# Patient Record
Sex: FEMALE | Race: WHITE | Employment: OTHER | URBAN - METROPOLITAN AREA
[De-identification: names, ages, dates, MRNs, and addresses within clinical notes are randomized per-mention and may not be internally consistent; named-entity substitution may affect disease eponyms.]

---

## 2017-03-05 RX ORDER — LISINOPRIL AND HYDROCHLOROTHIAZIDE 12.5; 2 MG/1; MG/1
TABLET ORAL
Qty: 90 TAB | Refills: 3 | Status: SHIPPED | OUTPATIENT
Start: 2017-03-05 | End: 2018-02-25 | Stop reason: SDUPTHER

## 2017-04-10 NOTE — TELEPHONE ENCOUNTER
Patient states that Dr. Abhilash Johnson has been prescribing Tramadol for over 5 years for pain he managed. [facility] is no longer prescribing any pain medication long term & requested she contact her PCP office. Patient states Tramadol is written for 50mg q6h prn. She is only taking it as 1 tab q8h. Patient advised Dr. Lanie Estrada will need to review this request on 4/12 & we will advise further. Patient given an opportunity to ask questions, repeated information, and verbalized understanding.

## 2017-04-11 ENCOUNTER — TELEPHONE (OUTPATIENT)
Dept: INTERNAL MEDICINE CLINIC | Age: 72
End: 2017-04-11

## 2017-04-11 RX ORDER — TRAMADOL HYDROCHLORIDE 50 MG/1
50 TABLET ORAL
Qty: 120 TAB | Refills: 1 | Status: SHIPPED | OUTPATIENT
Start: 2017-04-11 | End: 2017-05-23 | Stop reason: SDUPTHER

## 2017-04-11 NOTE — TELEPHONE ENCOUNTER
Requested Prescriptions     Signed Prescriptions Disp Refills    traMADol (ULTRAM) 50 mg tablet 120 Tab 1     Sig: Take 1 Tab by mouth every six (6) hours as needed. Max Daily Amount: 200 mg. Authorizing Provider: Gena Urbina     Phoned into pharmacy. Unable to reach patient. LVM informing patient medication approved & phoned into pharmacy.    Preferred Pharmacy        [facility]/pharmacy [facility], [address] [place] [id number]       Phone: [phone] Fax: [phone]

## 2017-05-22 DIAGNOSIS — E78.00 PURE HYPERCHOLESTEROLEMIA: ICD-10-CM

## 2017-05-22 DIAGNOSIS — I10 ESSENTIAL HYPERTENSION: Primary | ICD-10-CM

## 2017-05-23 ENCOUNTER — OFFICE VISIT (OUTPATIENT)
Dept: INTERNAL MEDICINE CLINIC | Age: 72
End: 2017-05-23

## 2017-05-23 VITALS
HEART RATE: 69 BPM | DIASTOLIC BLOOD PRESSURE: 65 MMHG | BODY MASS INDEX: 31.43 KG/M2 | WEIGHT: 177.4 LBS | TEMPERATURE: 98.2 F | OXYGEN SATURATION: 97 % | SYSTOLIC BLOOD PRESSURE: 134 MMHG | RESPIRATION RATE: 18 BRPM | HEIGHT: 63 IN

## 2017-05-23 DIAGNOSIS — M47.9 OSTEOARTHRITIS OF SPINE, UNSPECIFIED SPINAL OSTEOARTHRITIS COMPLICATION STATUS, UNSPECIFIED SPINAL REGION: ICD-10-CM

## 2017-05-23 DIAGNOSIS — E03.9 UNSPECIFIED HYPOTHYROIDISM: ICD-10-CM

## 2017-05-23 DIAGNOSIS — R01.1 HEART MURMUR: ICD-10-CM

## 2017-05-23 DIAGNOSIS — I10 ESSENTIAL HYPERTENSION, BENIGN: ICD-10-CM

## 2017-05-23 DIAGNOSIS — E78.00 PURE HYPERCHOLESTEROLEMIA: Primary | ICD-10-CM

## 2017-05-23 RX ORDER — TRAMADOL HYDROCHLORIDE 50 MG/1
50 TABLET ORAL
Qty: 120 TAB | Refills: 4 | Status: SHIPPED | OUTPATIENT
Start: 2017-05-23 | End: 2018-01-17 | Stop reason: SDUPTHER

## 2017-05-23 NOTE — MR AVS SNAPSHOT
Visit Information Date & Time Provider Department Dept. Phone Encounter #  
 5/23/2017  9:30 AM Dangelo Live, [address] [facility] [address] [phone] [id number] Follow-up Instructions Return in about 6 months (around 11/23/2017) for htn hld hypothyroid. Upcoming Health Maintenance Date Due Bone Densitometry 10/20/2013 GLAUCOMA SCREENING Q2Y 9/15/2016 BREAST CANCER SCRN MAMMOGRAM 5/6/2017 MEDICARE YEARLY EXAM 5/25/2017 INFLUENZA AGE 9 TO ADULT 8/1/2017 COLONOSCOPY 2/28/2024 DTaP/Tdap/Td series (2 - Td) 4/7/2024 Allergies as of 5/23/2017  Review Complete On: 5/23/2017 By: Ti JEROME Rash, LPN Severity Noted Reaction Type Reactions Statins-hmg-coa Reductase Inhibitors High 02/25/2014    Other (comments) Severe pain and weakness Amoxicillin  10/05/2009    Hives Colestid [Colestipol]  11/05/2014    Myalgia Pcn [Penicillins]  04/03/2012    Hives Pravastatin  10/05/2010    Myalgia Severe pain and weakness with all statins Current Immunizations  Reviewed on 11/23/2016 Name Date Influenza High Dose Vaccine PF 9/29/2016, 9/30/2015, 9/20/2014 Influenza Vaccine 10/1/2012 Pneumococcal Conjugate (PCV-13) 9/2/2015 Pneumococcal Polysaccharide (PPSV-23) 11/23/2016 Pneumococcal Vaccine (Unspecified Type) 11/3/2009 Tdap 4/7/2014 Zoster Vaccine, Live 10/15/2012 Not reviewed this visit You Were Diagnosed With   
  
 Codes Comments Pure hypercholesterolemia    -  Primary ICD-10-CM: E78.00 ICD-9-CM: 272.0 Essential hypertension, benign     ICD-10-CM: I10 
ICD-9-CM: 401.1 Unspecified hypothyroidism     ICD-10-CM: E03.9 ICD-9-CM: 244.9 Osteoarthritis of spine, unspecified spinal osteoarthritis complication status, unspecified spinal region     ICD-10-CM: M47.9 ICD-9-CM: 721.90 Heart murmur     ICD-10-CM: R01.1 ICD-9-CM: 432. 2 Vitals BP Pulse Temp Resp Height(growth percentile) Weight(growth percentile) 134/65 (BP 1 Location: Left arm, BP Patient Position: Sitting) 69 98.2 °F (36.8 °C) (Oral) 18 5' 3\" (1.6 m) 177 lb 6.4 oz (80.5 kg) SpO2 BMI OB Status Smoking Status 97% 31.42 kg/m2 Postmenopausal Never Smoker BMI and BSA Data Body Mass Index Body Surface Area  
 31.42 kg/m 2 1.89 m 2 Preferred Pharmacy Pharmacy Name Phone [facility] [address] [place] [address] [phone] Your Updated Medication List  
  
   
This list is accurate as of: 5/23/17 10:13 AM.  Always use your most recent med list. ADVIL LIQUI-GEL PO Take 400 mg by mouth three (3) times daily. 2 tablets 3 times daily  
  
 amLODIPine 2.5 mg tablet Commonly known as:  [place] Take 1 Tab by mouth daily. b complex vitamins tablet Take 1 Tab by mouth daily. ERIC ASPIRIN PO Take 500 mg by mouth daily. Extra strengh CRANBERRY CONCENTRATE Cap Generic drug:  vitamin c-vitamin e Take 500 mg by mouth daily. DITROPAN PO Take 15 mg by mouth daily. fexofenadine 180 mg tablet Commonly known as:  Christina Lakshmi Take  by mouth every thirty (30) days. fluticasone 50 mcg/actuation nasal spray Commonly known as:  [place] 2 Sprays by Nasal route nightly. GINKOBA PO Take 120 mg by mouth two (2) times a day. HAIR, SKIN, NAILS WITH BIOTIN 7.5-7.5-1,250 mg-unit-mcg Chew Generic drug:  ascorbic acid-vitamin E-biotin Take  by mouth. JINTELI 1-5 mg-mcg Tab Generic drug:  norethindrone acetate-ethinyl estradiol Take 1-5 mcg by mouth every other day. levothyroxine 50 mcg tablet Commonly known as:  SYNTHROID  
TAKE 1 TABLET BY MOUTH EVERY DAY  
  
 lisinopril-hydroCHLOROthiazide 20-12.5 mg per tablet Commonly known as:  PRINZIDE, ZESTORETIC  
TAKE 1 TABLET EVERY DAY  
  
 OCUVITE ADULT 50+ PO Take  by mouth daily. * omeprazole 20 mg capsule Commonly known as:  PRILOSEC Take 20 mg by mouth daily. * Omeprazole delayed release 20 mg tablet Commonly known as:  PRILOSEC D/R Take 20 mg by mouth daily. ONE DAILY GUMMY VITES PO Take 2 Tabs by mouth two (2) times a day. OTHER Patient is taking Omega Red daily POTASSIMIN PO Take 550 mg by mouth daily. traMADol 50 mg tablet Commonly known as:  ULTRAM  
Take 1 Tab by mouth every six (6) hours as needed. Max Daily Amount: 200 mg.  
  
 vitamin e 1,000 unit capsule Commonly known as:  E GEMS Take 1,000 Units by mouth daily. * Notice: This list has 2 medication(s) that are the same as other medications prescribed for you. Read the directions carefully, and ask your doctor or other care provider to review them with you. Prescriptions Printed Refills  
 traMADol (ULTRAM) 50 mg tablet 4 Sig: Take 1 Tab by mouth every six (6) hours as needed. Max Daily Amount: 200 mg. Class: Print Route: Oral  
  
Follow-up Instructions Return in about 6 months (around 11/23/2017) for htn hld hypothyroid. To-Do List   
 05/26/2017 ECHO:  2D ECHO COMPLETE ADULT (TTE) W OR WO CONTR Introducing [facility]! Peg Hemp introduces [employer] patient portal. Now you can access parts of your medical record, email your doctor's office, and request medication refills online. 1. In your internet browser, go to [email or web address] [employer] 2. Click on the First Time User? Click Here link in the Sign In box. You will see the New Member Sign Up page. 3. Enter your [employer] Access Code exactly as it appears below. You will not need to use this code after youve completed the sign-up process. If you do not sign up before the expiration date, you must request a new code. · [employer] Access Code: [email or web address] Expires: 8/21/2017 10:13 AM 
 
 4. Enter the last four digits of your Social Security Number (xxxx) and Date of Birth (mm/dd/yyyy) as indicated and click Submit. You will be taken to the next sign-up page. 5. Create a [employer] ID. This will be your [employer] login ID and cannot be changed, so think of one that is secure and easy to remember. 6. Create a [employer] password. You can change your password at any time. 7. Enter your Password Reset Question and Answer. This can be used at a later time if you forget your password. 8. Enter your e-mail address. You will receive e-mail notification when new information is available in [address] 9. Click Sign Up. You can now view and download portions of your medical record. 10. Click the Download Summary menu link to download a portable copy of your medical information. If you have questions, please visit the Frequently Asked Questions section of the [employer] website. Remember, [employer] is NOT to be used for urgent needs. For medical emergencies, dial 911. Now available from your iPhone and Android! Please provide this summary of care documentation to your next provider. Your primary care clinician is listed as Miguel PRATER. If you have any questions after today's visit, please call [phone].

## 2017-05-23 NOTE — PROGRESS NOTES
Kathleen Antony is a 73 y.o. female  Chief Complaint   Patient presents with    Hypertension    Cholesterol Problem     1. Have you been to an emergency room, urgent clinic, or hospitalized since your last visit? NO  If yes, where when, and reason for visit? 2. Have seen or consulted any other health care provider since your last visit? NO  Please include any pap smears or colon screening in this section  If yes, where when, and reason for visit? 6. Do you have an Advanced Directive/ Living Will in place?  YES  If yes, do we have a copy on file NO  If no, would you like information NO

## 2017-05-23 NOTE — PROGRESS NOTES
HISTORY OF PRESENT ILLNESS  Viktor Pérez is a 73 y.o. female. HPI   6 month f/u HTN HLD hypopthyroidism  Takes tramadol tid for back , hands and feet pain    Patient Active Problem List    Diagnosis Date Noted    Heart murmur 05/24/2016    Advanced care planning/counseling discussion 05/24/2016    DDD (degenerative disc disease), lumbar 10/04/2013    Obesity 04/05/2013    Essential hypertension, benign 03/31/2010    Pure hypercholesterolemia 03/31/2010    Overweight 03/31/2010    Unspecified hypothyroidism 03/31/2010    DJD (degenerative joint disease) 03/31/2010    Overactive bladder 03/31/2010    Diverticulosis 03/31/2010     Current Outpatient Prescriptions   Medication Sig Dispense Refill    traMADol (ULTRAM) 50 mg tablet Take 1 Tab by mouth every six (6) hours as needed. Max Daily Amount: 200 mg. 120 Tab 4    lisinopril-hydroCHLOROthiazide (PRINZIDE, ZESTORETIC) 20-12.5 mg per tablet TAKE 1 TABLET EVERY DAY 90 Tab 3    levothyroxine (SYNTHROID) 50 mcg tablet TAKE 1 TABLET BY MOUTH EVERY DAY 90 Tab 3    amLODIPine (NORVASC) 2.5 mg tablet Take 1 Tab by mouth daily. 90 Tab 3    ANTIOX #11/OM3/DHA/EPA/LUT/YIN (OCUVITE ADULT 50+ PO) Take  by mouth daily.  ascorbic acid-vitamin E-biotin (HAIR,SKIN & NAILS WITH BIOTIN) 7.5-7.5-1,250 mg-unit-mcg chew Take  by mouth.  OTHER Patient is taking Omega Red daily      ERIC ASPIRIN PO Take 500 mg by mouth daily. Extra strengh       OXYBUTYNIN CHLORIDE (DITROPAN PO) Take 15 mg by mouth daily.  FOLIC ACID/MULTIVITS-MIN (ONE DAILY GUMMY VITES PO) Take 2 Tabs by mouth two (2) times a day.  omeprazole (PRILOSEC) 20 mg capsule Take 20 mg by mouth daily.  IBUPROFEN (ADVIL LIQUI-GEL PO) Take 400 mg by mouth three (3) times daily. 2 tablets 3 times daily      ethinyl estradiol-norethindron (JINTELI) 1-5 mg-mcg Tab Take 1-5 mcg by mouth every other day.  vitamin c-vitamin e (CRANBERRY CONCENTRATE) Cap Take 500 mg by mouth daily.       fluticasone (FLONASE) 50 mcg/Actuation nasal spray 2 Sprays by Nasal route nightly.  fexofenadine (ALLEGRA) 180 mg tablet Take  by mouth every thirty (30) days.  vitamin e (E GEMS) 1,000 unit capsule Take 1,000 Units by mouth daily.  POTASSIUM (POTASSIMIN PO) Take 550 mg by mouth daily.  Omeprazole delayed release (PRILOSEC D/R) 20 mg tablet Take 20 mg by mouth daily.  b complex vitamins tablet Take 1 Tab by mouth daily.  GINKGO BILOBA (GINKOBA PO) Take 120 mg by mouth two (2) times a day. Allergies   Allergen Reactions    Statins-Hmg-Coa Reductase Inhibitors Other (comments)     Severe pain and weakness    Amoxicillin Hives    Colestid [Colestipol] Myalgia    Pcn [Penicillins] Hives    Pravastatin Myalgia     Severe pain and weakness with all statins      Lab Results  Component Value Date/Time   WBC 7.8 11/23/2016 01:50 PM   HGB 15.2 11/23/2016 01:50 PM   HCT 45.4 11/23/2016 01:50 PM   PLATELET 056 51/62/3925 01:50 PM   MCV 91 11/23/2016 01:50 PM       Lab Results  Component Value Date/Time   Cholesterol, total 237 11/09/2015 12:18 PM   HDL Cholesterol 53 11/09/2015 12:18 PM   LDL, calculated 163 11/09/2015 12:18 PM   Triglyceride 105 11/09/2015 12:18 PM       Lab Results  Component Value Date/Time   GFR est AA 80 11/23/2016 01:50 PM   GFR est non-AA 69 11/23/2016 01:50 PM   Creatinine 0.86 11/23/2016 01:50 PM   BUN 20 11/23/2016 01:50 PM   Sodium 139 11/23/2016 01:50 PM   Potassium 5.3 11/23/2016 01:50 PM   Chloride 98 11/23/2016 01:50 PM   CO2 25 11/23/2016 01:50 PM         ROS    Physical Exam   Constitutional: She appears well-developed and well-nourished. Appears stated age   Cardiovascular: Normal rate, regular rhythm and normal heart sounds. Exam reveals no gallop and no friction rub. No murmur heard. Pulmonary/Chest: Effort normal and breath sounds normal. No respiratory distress. She has no wheezes. Abdominal: Soft.  Bowel sounds are normal. Musculoskeletal: She exhibits no edema. Neurological: She is alert. Psychiatric: She has a normal mood and affect. Nursing note and vitals reviewed. ASSESSMENT and PLAN  James was seen today for hypertension and cholesterol problem.     Diagnoses and all orders for this visit:    Pure hypercholesterolemia   On krill oil   Intolerant statin, zetia  HTN   contrlled    Hypothyroid   appears euthyroid clinically  Heart murmur   Echo ordered    OA  ` Refilled tramadol 120 plus 4 refills  Other orders  -       RTC 6 months  Follow-up Disposition: Not on File

## 2017-06-08 ENCOUNTER — HOSPITAL ENCOUNTER (OUTPATIENT)
Dept: NON INVASIVE DIAGNOSTICS | Age: 72
Discharge: HOME | End: 2017-06-08
Attending: INTERNAL MEDICINE
Payer: MEDICARE

## 2017-06-08 DIAGNOSIS — R01.1 HEART MURMUR: ICD-10-CM

## 2017-06-08 PROBLEM — I35.0 NONRHEUMATIC AORTIC VALVE STENOSIS: Status: ACTIVE | Noted: 2017-06-08

## 2017-06-08 PROCEDURE — 93306 TTE W/DOPPLER COMPLETE: CPT

## 2017-10-19 ENCOUNTER — HOSPITAL ENCOUNTER (OUTPATIENT)
Dept: LAB | Age: 72
Discharge: HOME | End: 2017-10-19
Payer: MEDICARE

## 2017-10-19 ENCOUNTER — APPOINTMENT (OUTPATIENT)
Dept: INTERNAL MEDICINE CLINIC | Age: 72
End: 2017-10-19

## 2017-10-19 DIAGNOSIS — I10 ESSENTIAL HYPERTENSION, BENIGN: Primary | ICD-10-CM

## 2017-10-19 DIAGNOSIS — E78.00 PURE HYPERCHOLESTEROLEMIA: ICD-10-CM

## 2017-10-19 DIAGNOSIS — E03.9 HYPOTHYROIDISM, UNSPECIFIED TYPE: ICD-10-CM

## 2017-10-19 PROCEDURE — 85027 COMPLETE CBC AUTOMATED: CPT

## 2017-10-19 PROCEDURE — 36415 COLL VENOUS BLD VENIPUNCTURE: CPT

## 2017-10-19 PROCEDURE — 80061 LIPID PANEL: CPT

## 2017-10-19 PROCEDURE — 84443 ASSAY THYROID STIM HORMONE: CPT

## 2017-10-19 PROCEDURE — 80053 COMPREHEN METABOLIC PANEL: CPT

## 2017-10-20 LAB
ALBUMIN SERPL-MCNC: 4.4 G/DL (ref 3.5–4.8)
ALBUMIN/GLOB SERPL: 1.6 {RATIO} (ref 1.2–2.2)
ALP SERPL-CCNC: 90 IU/L (ref 39–117)
ALT SERPL-CCNC: 20 IU/L (ref 0–32)
AST SERPL-CCNC: 19 IU/L (ref 0–40)
BILIRUB SERPL-MCNC: 0.4 MG/DL (ref 0–1.2)
BUN SERPL-MCNC: 19 MG/DL (ref 8–27)
BUN/CREAT SERPL: 20 (ref 12–28)
CALCIUM SERPL-MCNC: 9.6 MG/DL (ref 8.7–10.3)
CHLORIDE SERPL-SCNC: 98 MMOL/L (ref 96–106)
CHOLEST SERPL-MCNC: 258 MG/DL (ref 100–199)
CO2 SERPL-SCNC: 24 MMOL/L (ref 18–29)
CREAT SERPL-MCNC: 0.94 MG/DL (ref 0.57–1)
ERYTHROCYTE [DISTWIDTH] IN BLOOD BY AUTOMATED COUNT: 13.7 % (ref 12.3–15.4)
GFR SERPLBLD CREATININE-BSD FMLA CKD-EPI: 62 ML/MIN/1.73
GFR SERPLBLD CREATININE-BSD FMLA CKD-EPI: 71 ML/MIN/1.73
GLOBULIN SER CALC-MCNC: 2.8 G/DL (ref 1.5–4.5)
GLUCOSE SERPL-MCNC: 98 MG/DL (ref 65–99)
HCT VFR BLD AUTO: 43.4 % (ref 34–46.6)
HDLC SERPL-MCNC: 56 MG/DL
HGB BLD-MCNC: 15.3 G/DL (ref 11.1–15.9)
LDLC SERPL CALC-MCNC: 178 MG/DL (ref 0–99)
MCH RBC QN AUTO: 30.9 PG (ref 26.6–33)
MCHC RBC AUTO-ENTMCNC: 35.3 G/DL (ref 31.5–35.7)
MCV RBC AUTO: 88 FL (ref 79–97)
PLATELET # BLD AUTO: 281 X10E3/UL (ref 150–379)
POTASSIUM SERPL-SCNC: 4.7 MMOL/L (ref 3.5–5.2)
PROT SERPL-MCNC: 7.2 G/DL (ref 6–8.5)
RBC # BLD AUTO: 4.95 X10E6/UL (ref 3.77–5.28)
SODIUM SERPL-SCNC: 139 MMOL/L (ref 134–144)
TRIGL SERPL-MCNC: 121 MG/DL (ref 0–149)
TSH SERPL DL<=0.005 MIU/L-ACNC: 0.86 UIU/ML (ref 0.45–4.5)
VLDLC SERPL CALC-MCNC: 24 MG/DL (ref 5–40)
WBC # BLD AUTO: 8.2 X10E3/UL (ref 3.4–10.8)

## 2017-11-26 NOTE — PROGRESS NOTES
HISTORY OF PRESENT ILLNESS  Jaswant Zhou is a 81 y.o. female. HPI   6 month f/u HTN HLD hypopthyroidism, chronic pain r/t oa of hands , back and feet  Takes tramadol tid for back , hands and feet pain  Saw Dr Gin Olivarez for increase low back pain and sciatica of right leg--s/p [id number] which has helped  Lowered tramadol to 2 tabs per day and taking gabapentin now  Had recent labs--normal cbc cmp tsh  LDL still elevated at 178 -statin intolerant and not able to take zetia or cholestipol. Labs were drawn nonfasting  Sees gyn MD for well woman care---goes q 2yrs due next year. Pt declines further mammograms       Patient Active Problem List    Diagnosis Date Noted    Nonrheumatic aortic valve stenosis 06/08/2017    Heart murmur 05/24/2016    Advanced care planning/counseling discussion 05/24/2016    DDD (degenerative disc disease), lumbar 10/04/2013    Obesity 04/05/2013    Essential hypertension, benign 03/31/2010    Pure hypercholesterolemia 03/31/2010    Overweight(278.02) 03/31/2010    Unspecified hypothyroidism 03/31/2010    DJD (degenerative joint disease) 03/31/2010    Overactive bladder 03/31/2010    Diverticulosis 03/31/2010     Current Outpatient Prescriptions   Medication Sig Dispense Refill    traMADol (ULTRAM) 50 mg tablet Take 1 Tab by mouth every six (6) hours as needed. Max Daily Amount: 200 mg. 120 Tab 4    lisinopril-hydroCHLOROthiazide (PRINZIDE, ZESTORETIC) 20-12.5 mg per tablet TAKE 1 TABLET EVERY DAY 90 Tab 3    levothyroxine (SYNTHROID) 50 mcg tablet TAKE 1 TABLET BY MOUTH EVERY DAY 90 Tab 3    amLODIPine (NORVASC) 2.5 mg tablet Take 1 Tab by mouth daily. 90 Tab 3    ANTIOX #11/OM3/DHA/EPA/LUT/YIN (OCUVITE ADULT 50+ PO) Take  by mouth daily.  Omeprazole delayed release (PRILOSEC D/R) 20 mg tablet Take 20 mg by mouth daily.  ascorbic acid-vitamin E-biotin (HAIR,SKIN & NAILS WITH BIOTIN) 7.5-7.5-1,250 mg-unit-mcg chew Take  by mouth.       OTHER Patient is taking Omega Red daily      ERIC ASPIRIN PO Take 500 mg by mouth daily. Extra strengh       OXYBUTYNIN CHLORIDE (DITROPAN PO) Take 15 mg by mouth daily.  FOLIC ACID/MULTIVITS-MIN (ONE DAILY GUMMY VITES PO) Take 2 Tabs by mouth two (2) times a day.  omeprazole (PRILOSEC) 20 mg capsule Take 20 mg by mouth daily.  b complex vitamins tablet Take 1 Tab by mouth daily.  IBUPROFEN (ADVIL LIQUI-GEL PO) Take 400 mg by mouth three (3) times daily. 2 tablets 3 times daily      ethinyl estradiol-norethindron (JINTELI) 1-5 mg-mcg Tab Take 1-5 mcg by mouth every other day.  vitamin c-vitamin e (CRANBERRY CONCENTRATE) Cap Take 500 mg by mouth daily.  fluticasone (FLONASE) 50 mcg/Actuation nasal spray 2 Sprays by Nasal route nightly.  fexofenadine (ALLEGRA) 180 mg tablet Take  by mouth every thirty (30) days.  vitamin e (E GEMS) 1,000 unit capsule Take 1,000 Units by mouth daily.  GINKGO BILOBA (GINKOBA PO) Take 120 mg by mouth two (2) times a day.  POTASSIUM (POTASSIMIN PO) Take 550 mg by mouth daily.        Allergies   Allergen Reactions    Statins-Hmg-Coa Reductase Inhibitors Other (comments)     Severe pain and weakness    Amoxicillin Hives    Colestid [Colestipol] Myalgia    Pcn [Penicillins] Hives    Pravastatin Myalgia     Severe pain and weakness with all statins      Lab Results  Component Value Date/Time   WBC 8.2 10/19/2017 01:42 PM   HGB 15.3 10/19/2017 01:42 PM   HCT 43.4 10/19/2017 01:42 PM   PLATELET 507 09/10/7177 01:42 PM   MCV 88 10/19/2017 01:42 PM     Lab Results  Component Value Date/Time   Glucose 98 10/19/2017 01:42 PM   LDL, calculated 178 10/19/2017 01:42 PM   Creatinine 0.94 10/19/2017 01:42 PM      Lab Results  Component Value Date/Time   Cholesterol, total 258 10/19/2017 01:42 PM   Cholesterol (POC) 204 04/03/2012 10:59 AM   HDL Cholesterol 56 10/19/2017 01:42 PM   LDL, calculated 178 10/19/2017 01:42 PM   LDL Cholesterol (POC) 131 04/03/2012 10:59 AM Triglyceride 121 10/19/2017 01:42 PM   Triglycerides (POC) 142 04/03/2012 10:59 AM   Lab Results  Component Value Date/Time   GFR est non-AA 62 10/19/2017 01:42 PM   GFR est AA 71 10/19/2017 01:42 PM   Creatinine 0.94 10/19/2017 01:42 PM   BUN 19 10/19/2017 01:42 PM   Sodium 139 10/19/2017 01:42 PM   Potassium 4.7 10/19/2017 01:42 PM   Chloride 98 10/19/2017 01:42 PM   CO2 24 10/19/2017 01:42 PM              ROS    Physical Exam   Constitutional: She appears well-developed and well-nourished. Appears stated age   Cardiovascular: Normal rate and regular rhythm. Exam reveals no gallop and no friction rub. Murmur heard. 2/6 manjeet RUSB   Pulmonary/Chest: Effort normal and breath sounds normal. No respiratory distress. She has no wheezes. Abdominal: Soft. Bowel sounds are normal.   Musculoskeletal: She exhibits no edema. Neurological: She is alert. Psychiatric: She has a normal mood and affect. Nursing note and vitals reviewed. ASSESSMENT and PLAN  Diagnoses and all orders for this visit:    1. Essential hypertension, benign   Reasonable control  2. Pure hypercholesterolemia   Recent lipids were nonfasting   continie to work on low fat low cholesterol diet and exercise   Did not want to try welchol  3. Hypothyroidism, unspecified type   tsh wnl  4. Primary osteoarthritis involving multiple joints   Refill tramadol when needed again   Needs to sign pain contract but left today prior to signing   Weight reduction advised  5. Encounter for long-term opiate analgesic use  -     TOXASSURE SELECT 13 (MW)   Pt has lowered tramadol to 2-3 tabs per day   Sign pain contract today  6. Gastroesophageal reflux disease, esophagitis presence not specified    7. Mild aortic stenosis  -     [facility]      Follow-up Disposition:  Return in about 6 months (around 5/27/2018) for f/u htn hld chornic pain in back and legs, hands.

## 2017-11-27 ENCOUNTER — OFFICE VISIT (OUTPATIENT)
Dept: INTERNAL MEDICINE CLINIC | Age: 72
End: 2017-11-27

## 2017-11-27 VITALS
TEMPERATURE: 98.6 F | HEIGHT: 63 IN | BODY MASS INDEX: 32.07 KG/M2 | OXYGEN SATURATION: 94 % | SYSTOLIC BLOOD PRESSURE: 144 MMHG | WEIGHT: 181 LBS | DIASTOLIC BLOOD PRESSURE: 71 MMHG | HEART RATE: 68 BPM

## 2017-11-27 DIAGNOSIS — K21.9 GASTROESOPHAGEAL REFLUX DISEASE, ESOPHAGITIS PRESENCE NOT SPECIFIED: ICD-10-CM

## 2017-11-27 DIAGNOSIS — M15.9 PRIMARY OSTEOARTHRITIS INVOLVING MULTIPLE JOINTS: ICD-10-CM

## 2017-11-27 DIAGNOSIS — E03.9 HYPOTHYROIDISM, UNSPECIFIED TYPE: ICD-10-CM

## 2017-11-27 DIAGNOSIS — Z79.891 ENCOUNTER FOR LONG-TERM OPIATE ANALGESIC USE: ICD-10-CM

## 2017-11-27 DIAGNOSIS — I35.0 MILD AORTIC STENOSIS: ICD-10-CM

## 2017-11-27 DIAGNOSIS — I10 ESSENTIAL HYPERTENSION, BENIGN: Primary | ICD-10-CM

## 2017-11-27 DIAGNOSIS — E78.00 PURE HYPERCHOLESTEROLEMIA: ICD-10-CM

## 2017-11-27 RX ORDER — GABAPENTIN 100 MG/1
CAPSULE ORAL 3 TIMES DAILY
COMMUNITY
End: 2019-01-23 | Stop reason: SDUPTHER

## 2017-11-27 RX ORDER — FLUTICASONE FUROATE AND VILANTEROL 100; 25 UG/1; UG/1
1 POWDER RESPIRATORY (INHALATION) DAILY
COMMUNITY
End: 2017-11-27 | Stop reason: SDUPTHER

## 2017-11-27 RX ORDER — FLUTICASONE FUROATE AND VILANTEROL 200; 25 UG/1; UG/1
1 POWDER RESPIRATORY (INHALATION) DAILY
COMMUNITY
End: 2020-02-24

## 2017-11-27 NOTE — MR AVS SNAPSHOT
Visit Information Date & Time Provider Department Dept. Phone Encounter #  
 11/27/2017 10:15 AM Al Jimenez, [address] [place] [address],[address] [phone] [id number] Follow-up Instructions Return in about 6 months (around 5/27/2018) for f/u htn hld chornic pain in back and legs, hands. Upcoming Health Maintenance Date Due Bone Densitometry 10/20/2013 GLAUCOMA SCREENING Q2Y 9/15/2016 MEDICARE YEARLY EXAM 5/25/2017 Influenza Age 5 to Adult 8/1/2017 BREAST CANCER SCRN MAMMOGRAM 11/27/2018 COLONOSCOPY 2/28/2024 DTaP/Tdap/Td series (2 - Td) 4/7/2024 Allergies as of 11/27/2017  Review Complete On: 11/27/2017 By: Al Jimenez MD  
  
 Severity Noted Reaction Type Reactions Statins-hmg-coa Reductase Inhibitors High 02/25/2014    Other (comments) Severe pain and weakness Amoxicillin  10/05/2009    Hives Colestid [Colestipol]  11/05/2014    Myalgia Pcn [Penicillins]  04/03/2012    Hives Pravastatin  10/05/2010    Myalgia Severe pain and weakness with all statins Current Immunizations  Reviewed on 11/23/2016 Name Date Influenza High Dose Vaccine PF 9/28/2017, 9/29/2016, 9/30/2015, 9/20/2014 Influenza Vaccine 10/1/2012 Pneumococcal Conjugate (PCV-13) 9/2/2015 Pneumococcal Polysaccharide (PPSV-23) 11/23/2016 Pneumococcal Vaccine (Unspecified Type) 11/3/2009 Tdap 4/7/2014 Zoster Vaccine, Live 10/15/2012 Not reviewed this visit You Were Diagnosed With   
  
 Codes Comments Essential hypertension, benign    -  Primary ICD-10-CM: I10 
ICD-9-CM: 401.1 Pure hypercholesterolemia     ICD-10-CM: E78.00 ICD-9-CM: 272.0 Hypothyroidism, unspecified type     ICD-10-CM: E03.9 ICD-9-CM: 244.9 Primary osteoarthritis involving multiple joints     ICD-10-CM: M15.0 ICD-9-CM: 715.09 Encounter for long-term opiate analgesic use     ICD-10-CM: T04.728 ICD-9-CM: V58.69 Gastroesophageal reflux disease, esophagitis presence not specified     ICD-10-CM: K21.9 ICD-9-CM: 530.81 Mild aortic stenosis     ICD-10-CM: I35.0 ICD-9-CM: 424.1 Vitals BP Pulse Temp Height(growth percentile) Weight(growth percentile) SpO2  
 144/71 (BP 1 Location: Left arm, BP Patient Position: Sitting) 68 98.6 °F (37 °C) (Oral) 5' 3\" (1.6 m) 181 lb (82.1 kg) 94% BMI OB Status Smoking Status 32.06 kg/m2 Postmenopausal Never Smoker BMI and BSA Data Body Mass Index Body Surface Area 32.06 kg/m 2 1.91 m 2 Preferred Pharmacy Pharmacy Name Phone [facility] [address] Quinn[address] [phone] Your Updated Medication List  
  
   
This list is accurate as of: 11/27/17 11:17 AM.  Always use your most recent med list. ADVIL LIQUI-GEL PO Take 400 mg by mouth three (3) times daily. 2 tablets 3 times daily  
  
 b complex vitamins tablet Take 1 Tab by mouth daily. ERIC ASPIRIN PO Take 500 mg by mouth daily. Extra strengh BREO ELLIPTA 200-25 mcg/dose inhaler Generic drug:  fluticasone-vilanterol Take 1 Puff by inhalation daily. CRANBERRY CONCENTRATE Cap Generic drug:  vitamin c-vitamin e Take 500 mg by mouth daily. DITROPAN PO Take 15 mg by mouth daily. fexofenadine 180 mg tablet Commonly known as:  Alroy Cordell Take  by mouth every thirty (30) days. fluticasone 50 mcg/actuation nasal spray Commonly known as:  Cresencio Atkins 2 Sprays by Nasal route nightly.  
  
 gabapentin 100 mg capsule Commonly known as:  NEURONTIN Take  by mouth three (3) times daily. GINKOBA PO Take 120 mg by mouth two (2) times a day. HAIR, SKIN, NAILS WITH BIOTIN 7.5-7.5-1,250 mg-unit-mcg Chew Generic drug:  ascorbic acid-vitamin E-biotin Take  by mouth. JINTELI 1-5 mg-mcg Tab Generic drug:  norethindrone acetate-ethinyl estradiol Take 1-5 mcg by mouth every other day. levothyroxine 50 mcg tablet Commonly known as:  SYNTHROID  
TAKE 1 TABLET BY MOUTH EVERY DAY  
  
 lisinopril-hydroCHLOROthiazide 20-12.5 mg per tablet Commonly known as:  PRINZIDE, ZESTORETIC  
TAKE 1 TABLET EVERY DAY  
  
 OCUVITE ADULT 50+ PO Take  by mouth daily. Omeprazole delayed release 20 mg tablet Commonly known as:  PRILOSEC D/R Take 20 mg by mouth daily. ONE DAILY GUMMY VITES PO Take 2 Tabs by mouth two (2) times a day. OTHER Patient is taking Omega Red daily POTASSIMIN PO Take 550 mg by mouth daily. 1/2 tab  
  
 traMADol 50 mg tablet Commonly known as:  ULTRAM  
Take 1 Tab by mouth every six (6) hours as needed. Max Daily Amount: 200 mg.  
  
 vitamin e 1,000 unit capsule Commonly known as:  E GEMS Take 1,000 Units by mouth daily. We Performed the Following REFERRAL TO CARDIOLOGY [YVE21 Custom] [facility] () [[id number] Custom] Follow-up Instructions Return in about 6 months (around 5/27/2018) for f/u htn hld chornic pain in back and legs, hands. Referral Information Referral ID Referred By Referred To  
  
 [id number] MELANIE PRATER MD   
   [address] [place] [address] Phone: [phone] Fax: [phone] Visits Status Start Date End Date 1 New Request 11/27/17 11/27/18 If your referral has a status of pending review or denied, additional information will be sent to support the outcome of this decision. Introducing [facility]! Guillaume Núñez introduces [employer] patient portal. Now you can access parts of your medical record, email your doctor's office, and request medication refills online. 1. In your internet browser, go to [email or web address] 2. Click on the First Time User? Click Here link in the Sign In box. You will see the New Member Sign Up page. 3. Enter your [employer] Access Code exactly as it appears below. You will not need to use this code after youve completed the sign-up process. If you do not sign up before the expiration date, you must request a new code. · [employer] Access Code: [email or web address] Expires: 1/17/2018 11:27 AM 
 
4. Enter the last four digits of your Social Security Number (xxxx) and Date of Birth (mm/dd/yyyy) as indicated and click Submit. You will be taken to the next sign-up page. 5. Create a [employer] ID. This will be your [employer] login ID and cannot be changed, so think of one that is secure and easy to remember. 6. Create a [employer] password. You can change your password at any time. 7. Enter your Password Reset Question and Answer. This can be used at a later time if you forget your password. 8. Enter your e-mail address. You will receive e-mail notification when new information is available in [address] 9. Click Sign Up. You can now view and download portions of your medical record. 10. Click the Download Summary menu link to download a portable copy of your medical information. If you have questions, please visit the Frequently Asked Questions section of the [employer] website. Remember, [employer] is NOT to be used for urgent needs. For medical emergencies, dial 911. Now available from your iPhone and Android! Please provide this summary of care documentation to your next provider. Your primary care clinician is listed as Joyce PRATER. If you have any questions after today's visit, please call [phone].

## 2017-12-02 RX ORDER — LEVOTHYROXINE SODIUM 50 UG/1
TABLET ORAL
Qty: 90 TAB | Refills: 3 | Status: SHIPPED | OUTPATIENT
Start: 2017-12-02 | End: 2018-11-12 | Stop reason: SDUPTHER

## 2017-12-13 NOTE — TELEPHONE ENCOUNTER
#[phone]  Pt states she can't get in touch with physician that prescribes this. Please refill she ask.   Oxybutynin 15 mg

## 2017-12-14 RX ORDER — OXYBUTYNIN CHLORIDE 15 MG/1
15 TABLET, EXTENDED RELEASE ORAL DAILY
Qty: 90 TAB | Refills: 3 | Status: SHIPPED | OUTPATIENT
Start: 2017-12-14 | End: 2018-10-26 | Stop reason: SDUPTHER

## 2018-02-01 ENCOUNTER — OFFICE VISIT (OUTPATIENT)
Dept: CARDIOLOGY CLINIC | Age: 73
End: 2018-02-01

## 2018-02-01 ENCOUNTER — CLINICAL SUPPORT (OUTPATIENT)
Dept: CARDIOLOGY CLINIC | Age: 73
End: 2018-02-01

## 2018-02-01 VITALS
WEIGHT: 183.8 LBS | DIASTOLIC BLOOD PRESSURE: 78 MMHG | OXYGEN SATURATION: 97 % | SYSTOLIC BLOOD PRESSURE: 152 MMHG | RESPIRATION RATE: 20 BRPM | HEIGHT: 63 IN | BODY MASS INDEX: 32.57 KG/M2 | HEART RATE: 66 BPM

## 2018-02-01 DIAGNOSIS — R01.1 HEART MURMUR: ICD-10-CM

## 2018-02-01 DIAGNOSIS — I35.0 AORTIC STENOSIS, MILD: ICD-10-CM

## 2018-02-01 DIAGNOSIS — I10 ESSENTIAL HYPERTENSION, BENIGN: ICD-10-CM

## 2018-02-01 DIAGNOSIS — E78.00 PURE HYPERCHOLESTEROLEMIA: ICD-10-CM

## 2018-02-01 DIAGNOSIS — R00.2 FLUTTERING SENSATION OF HEART: Primary | ICD-10-CM

## 2018-02-01 DIAGNOSIS — R07.89 CHEST TIGHTNESS OR PRESSURE: Primary | ICD-10-CM

## 2018-02-01 DIAGNOSIS — R00.2 INTERMITTENT PALPITATIONS: ICD-10-CM

## 2018-02-01 NOTE — PROGRESS NOTES
1. Have you been to the ER, urgent care clinic since your last visit? Hospitalized since your last visit? NO    2. Have you seen or consulted any other health care providers outside of the [address] [place] [address] since your last visit? Include any pap smears or colon screening. YES ORTHO. NEW PATIENT. C/O DIZZINESS, HEART FLUTTERING OFF AND ON, PRESSURE IN CHEST.

## 2018-02-01 NOTE — MR AVS SNAPSHOT
[address] 
[phone] Patient: Bill Carmen MRN:  [id number] Visit Information Date & Time Provider Department Dept. Phone Encounter #  
 2/1/2018  2:30 PM Andrew Justin, 1024 [facility] [phone] [id number] Your Appointments 5/31/2018 11:00 AM  
ROUTINE CARE with Lyssa Moser, [address] [place] [address],[address]) Appt Note: 6 month follow up  
 [facility] [address] [address]  
[address] [facility] [address] Upcoming Health Maintenance Date Due Bone Densitometry 10/20/2013 GLAUCOMA SCREENING Q2Y 9/15/2016 MEDICARE YEARLY EXAM 5/25/2017 BREAST CANCER SCRN MAMMOGRAM 11/27/2018 COLONOSCOPY 2/28/2024 DTaP/Tdap/Td series (2 - Td) 4/7/2024 Allergies as of 2/1/2018  Review Complete On: 2/1/2018 By: Willie Ruffin NP Severity Noted Reaction Type Reactions Statins-hmg-coa Reductase Inhibitors High 02/25/2014    Other (comments) Severe pain and weakness Amoxicillin  10/05/2009    Hives Colestid [Colestipol]  11/05/2014    Myalgia Pcn [Penicillins]  04/03/2012    Hives Pravastatin  10/05/2010    Myalgia Severe pain and weakness with all statins Current Immunizations  Reviewed on 11/23/2016 Name Date Influenza High Dose Vaccine PF 9/28/2017, 9/29/2016, 9/30/2015, 9/20/2014 Influenza Vaccine 10/1/2012 Pneumococcal Conjugate (PCV-13) 9/2/2015 Pneumococcal Polysaccharide (PPSV-23) 11/23/2016 Pneumococcal Vaccine (Unspecified Type) 11/3/2009 Tdap 4/7/2014 Zoster Vaccine, Live 10/15/2012 Not reviewed this visit You Were Diagnosed With   
  
 Codes Comments Essential hypertension, benign    -  Primary ICD-10-CM: I10 
ICD-9-CM: 401.1  Pure hypercholesterolemia     ICD-10-CM: E78.00 
 ICD-9-CM: 272.0 Heart murmur     ICD-10-CM: R01.1 ICD-9-CM: 300. 2 Aortic stenosis, mild     ICD-10-CM: I35.0 ICD-9-CM: 424.1 Vitals BP Pulse Resp Height(growth percentile) Weight(growth percentile) SpO2  
 152/78 (BP 1 Location: Right arm, BP Patient Position: Standing) 66 20 5' 3\" (1.6 m) 183 lb 12.8 oz (83.4 kg) 97% BMI OB Status Smoking Status 32.56 kg/m2 Postmenopausal Never Smoker Vitals History BMI and BSA Data Body Mass Index Body Surface Area 32.56 kg/m 2 1.93 m 2 Preferred Pharmacy Pharmacy Name Phone [facility] [address] [phone] Your Updated Medication List  
  
   
This list is accurate as of: 2/1/18  3:23 PM.  Always use your most recent med list. ADVIL LIQUI-GEL PO Take 400 mg by mouth three (3) times daily. 2 tablets 3 times daily  
  
 b complex vitamins tablet Take 1 Tab by mouth daily. ERIC ASPIRIN PO Take 500 mg by mouth daily. Extra strengh BREO ELLIPTA 200-25 mcg/dose inhaler Generic drug:  fluticasone-vilanterol Take 1 Puff by inhalation daily. CRANBERRY CONCENTRATE Cap Generic drug:  vitamin c-vitamin e Take 500 mg by mouth daily. * DITROPAN PO Take 15 mg by mouth daily. * oxybutynin chloride XL 15 mg CR tablet Commonly known as:  DITROPAN XL Take 1 Tab by mouth daily. fexofenadine 180 mg tablet Commonly known as:  Lazaro Ege Take  by mouth every thirty (30) days. fluticasone 50 mcg/actuation nasal spray Commonly known as:  Jacqueline Perez 2 Sprays by Nasal route nightly. FLUZONE HIGH-DOSE 2017-18 (PF) Syrg injection Generic drug:  influenza vaccine 2017-18 (65 yrs+)(PF)  
TO BE ADMINISTERED BY PHARMACIST FOR IMMUNIZATION  
  
 gabapentin 100 mg capsule Commonly known as:  NEURONTIN Take  by mouth three (3) times daily.   
  
 GINKOBA PO  
 Take 120 mg by mouth two (2) times a day. HAIR, SKIN, NAILS WITH BIOTIN 7.5-7.5-1,250 mg-unit-mcg Chew Generic drug:  ascorbic acid-vitamin E-biotin Take  by mouth. JINTELI 1-5 mg-mcg Tab Generic drug:  norethindrone acetate-ethinyl estradiol Take 1-5 mcg by mouth every other day. levothyroxine 50 mcg tablet Commonly known as:  SYNTHROID  
TAKE 1 TABLET BY MOUTH EVERY DAY  
  
 lisinopril-hydroCHLOROthiazide 20-12.5 mg per tablet Commonly known as:  PRINZIDE, ZESTORETIC  
TAKE 1 TABLET EVERY DAY  
  
 OCUVITE ADULT 50+ PO Take  by mouth daily. Omeprazole delayed release 20 mg tablet Commonly known as:  PRILOSEC D/R Take 20 mg by mouth daily. ONE DAILY GUMMY VITES PO Take 2 Tabs by mouth two (2) times a day. OTHER Patient is taking Omega Red daily POTASSIMIN PO Take 550 mg by mouth daily. 1/2 tab  
  
 traMADol 50 mg tablet Commonly known as:  ULTRAM  
TAKE 1 TABLET EVERY 6 HOURS AS NEEDED  
  
 vitamin e 1,000 unit capsule Commonly known as:  E GEMS Take 1,000 Units by mouth daily. * Notice: This list has 2 medication(s) that are the same as other medications prescribed for you. Read the directions carefully, and ask your doctor or other care provider to review them with you. We Performed the Following AMB POC EKG ROUTINE W/ 12 LEADS, INTER & REP [54013 CPT(R)] To-Do List   
 02/02/2018 ECG:  CARDIAC HOLTER MONITOR, 24 HOURS   
  
 02/02/2018 ECG:  STRESS TEST LEXISCAN/CARDIOLITE Introducing hospitals [facility] HE[facility]! Chelsea Pedraza introduces [employer] patient portal. Now you can access parts of your medical record, email your doctor's office, and request medication refills online. 1. In your internet browser, go to [email or web address] 2. Click on the First Time User? Click Here link in the Sign In box. You will see the New Member Sign Up page. 3. Enter your [employer] Access Code exactly as it appears below. You will not need to use this code after youve completed the sign-up process. If you do not sign up before the expiration date, you must request a new code. · [employer] Access Code: [email or web address] Expires: 5/2/2018  1:57 PM 
 
4. Enter the last four digits of your Social Security Number (xxxx) and Date of Birth (mm/dd/yyyy) as indicated and click Submit. You will be taken to the next sign-up page. 5. Create a SPO ID. This will be your [employer]PO login ID and cannot be changed, so think of one that is secure and easy to remember. 6. Create a SPO password. You can change your password at any time. 7. Enter your Password Reset Question and Answer. This can be used at a later time if you forget your password. 8. Enter your e-mail address. You will receive e-mail notification when new information is available in [address] 9. Click Sign Up. You can now view and download portions of your medical record. 10. Click the Download Summary menu link to download a portable copy of your medical information. If you have questions, please visit the Frequently Asked Questions section of the [employer] website. Remember, [employer] is NOT to be used for urgent needs. For medical emergencies, dial 911. Now available from your iPhone and Android! Please provide this summary of care documentation to your next provider. Your primary care clinician is listed as Adis PRATER. If you have any questions after today's visit, please call [phone].

## 2018-02-01 NOTE — PROGRESS NOTES
Veronica Terrazas DNP, ANP-BC  Subjective/HPI:     Kirby Ty is a 81 y.o. female is here for new patient consultation regarding episodes of anterior chest pressure which she noted to begin in July, initially she felt it may be secondary to GI reflux or pulmonary related due to allergies/restrictive airway. She reports the episodes have continued to wax and wane describing a moderate amount of pressure placing her hands over her upper sternal area, denies to symptoms radiate outside of the chest.  She also reports feeling palpitations most noted when laying back in a recliner in the evening. Echocardiogram ordered prior to consultation by primary care notes mild aortic stenosis, normal systolic function. SUMMARY:  Left ventricle: Systolic function was normal. Ejection fraction was  estimated to be 60 %. There were no regional wall motion abnormalities. There was mild concentric hypertrophy. Left atrium: The atrium was mildly dilated. Aortic valve: There was mild stenosis. Valve mean gradient was 12 mmHg.     PCP Provider  Goran Mcnair MD  Past Medical History:   Diagnosis Date    Arthritis     back,feet    Chronic pain     back,feet    GERD (gastroesophageal reflux disease)     Hypertension     Obesity     Other ill-defined conditions(799.89)     heart murmur     Stroke ([facility] [address]     mini    Thyroid disease     hypo    Unspecified adverse effect of anesthesia     slow to wake up      Past Surgical History:   Procedure Laterality Date    HX ORTHOPAEDIC      left ankle surgery due to fx    HX TONSILLECTOMY      HX UROLOGICAL  2013    bladder biopsy    HI COLONOSCOPY W/BIOPSY SINGLE/MULTIPLE  2/28/2014         HI EGD BALLOON DILATION ESOPHAGUS <30 MM DIAM  11/5/2012         HI EGD TRANSORAL BIOPSY SINGLE/MULTIPLE  11/5/2012          Allergies   Allergen Reactions    Statins-Hmg-Coa Reductase Inhibitors Other (comments)     Severe pain and weakness    Amoxicillin Hives    Colestid [Colestipol] Myalgia    Pcn [Penicillins] Hives    Pravastatin Myalgia     Severe pain and weakness with all statins      Family History   Problem Relation Age of Onset    Alcohol abuse Mother     Arthritis-osteo Mother     Cancer Father      testicular      Current Outpatient Prescriptions   Medication Sig    influenza vaccine 2017-18, 65 yrs+,,PF, (FLUZONE HIGH-DOSE 2017-18, PF,) syrg injection TO BE ADMINISTERED BY PHARMACIST FOR IMMUNIZATION    traMADol (ULTRAM) 50 mg tablet TAKE 1 TABLET EVERY 6 HOURS AS NEEDED    oxybutynin chloride XL (DITROPAN XL) 15 mg CR tablet Take 1 Tab by mouth daily.  levothyroxine (SYNTHROID) 50 mcg tablet TAKE 1 TABLET BY MOUTH EVERY DAY    gabapentin (NEURONTIN) 100 mg capsule Take  by mouth three (3) times daily.  fluticasone-vilanterol (BREO ELLIPTA) 200-25 mcg/dose inhaler Take 1 Puff by inhalation daily.  lisinopril-hydroCHLOROthiazide (PRINZIDE, ZESTORETIC) 20-12.5 mg per tablet TAKE 1 TABLET EVERY DAY    ANTIOX #11/OM3/DHA/EPA/LUT/YIN (OCUVITE ADULT 50+ PO) Take  by mouth daily.  Omeprazole delayed release (PRILOSEC D/R) 20 mg tablet Take 20 mg by mouth daily.  ascorbic acid-vitamin E-biotin (HAIR,SKIN & NAILS WITH BIOTIN) 7.5-7.5-1,250 mg-unit-mcg chew Take  by mouth.  OTHER Patient is taking Omega Red daily    ERIC ASPIRIN PO Take 500 mg by mouth daily. Extra strengh     OXYBUTYNIN CHLORIDE (DITROPAN PO) Take 15 mg by mouth daily.  FOLIC ACID/MULTIVITS-MIN (ONE DAILY GUMMY VITES PO) Take 2 Tabs by mouth two (2) times a day.  IBUPROFEN (ADVIL LIQUI-GEL PO) Take 400 mg by mouth three (3) times daily. 2 tablets 3 times daily    ethinyl estradiol-norethindron (JINTELI) 1-5 mg-mcg Tab Take 1-5 mcg by mouth every other day.  vitamin c-vitamin e (CRANBERRY CONCENTRATE) Cap Take 500 mg by mouth daily.  fluticasone (FLONASE) 50 mcg/Actuation nasal spray 2 Sprays by Nasal route nightly.     fexofenadine (ALLEGRA) 180 mg tablet Take  by mouth every thirty (30) days.  vitamin e (E GEMS) 1,000 unit capsule Take 1,000 Units by mouth daily.  POTASSIUM (POTASSIMIN PO) Take 550 mg by mouth daily. 1/2 tab    b complex vitamins tablet Take 1 Tab by mouth daily.  GINKGO BILOBA (GINKOBA PO) Take 120 mg by mouth two (2) times a day. No current facility-administered medications for this visit. Vitals:    02/01/18 1405 02/01/18 1417 02/01/18 1418   BP: 170/70 162/80 152/78   Pulse: 66     Resp: 20     SpO2: 97%     Weight: 183 lb 12.8 oz (83.4 kg)     Height: 5' 3\" (1.6 m)       Social History     Social History    Marital status:      Spouse name: N/A    Number of children: N/A    Years of education: N/A     Occupational History    Not on file. Social History Main Topics    Smoking status: Never Smoker    Smokeless tobacco: Never Used    Alcohol use No    Drug use: Yes     Special: Prescription, OTC    Sexual activity: Not on file     Other Topics Concern    Not on file     Social History Narrative       I have reviewed the nurses notes, vitals, problem list, allergy list, medical history, family, social history and medications. Review of Symptoms:    General: Pt denies excessive weight gain or loss. Pt is able to conduct ADL's  HEENT: Denies blurred vision, headaches, epistaxis and difficulty swallowing. Respiratory: Denies shortness of breath, ROLDAN, wheezing or stridor. Cardiovascular:  + chest pressure , + palpitations, + edema bilateral legs with history of varicosities  denies PND  Gastrointestinal: Denies poor appetite, indigestion, abdominal pain or blood in stool  Musculoskeletal: Denies pain or swelling from muscles or joints  Neurologic: Denies tremor, paresthesias, or sensory motor disturbance  Skin: Denies rash, itching or texture change. Physical Exam:      General: Well developed, in no acute distress, cooperative and alert  HEENT: No carotid bruits, no JVD, trach is midline.  Neck Supple, PEERL, EOM intact. Heart:  Normal S1/S2 negative S3 or S4. Regular, no murmur, gallop or rub.   Respiratory: Clear bilaterally x 4, no wheezing or rales  Abdomen:   Soft, non-tender, no masses, bowel sounds are active.   Extremities: Nonpitting bilateral dependent ankle and mid calf edema, normal cap refill, no cyanosis, atraumatic. Neuro: A&Ox3, speech clear, gait stable. Skin: Skin color is normal. No rashes or lesions. Non diaphoretic  Vascular: 2+ pulses symmetric in all extremities, marked bilateral lower extremity varicosities torturous and ropelike posterior right leg. Cardiographics    ECG: Sinus rhythm  No results found for this or any previous visit. Cardiology Labs:  Lab Results   Component Value Date/Time    Cholesterol, total 258 10/19/2017 01:42 PM    HDL Cholesterol 56 10/19/2017 01:42 PM    LDL, calculated 178 10/19/2017 01:42 PM    Triglyceride 121 10/19/2017 01:42 PM       Lab Results   Component Value Date/Time    Sodium 139 10/19/2017 01:42 PM    Potassium 4.7 10/19/2017 01:42 PM    Chloride 98 10/19/2017 01:42 PM    CO2 24 10/19/2017 01:42 PM    Glucose 98 10/19/2017 01:42 PM    BUN 19 10/19/2017 01:42 PM    Creatinine 0.94 10/19/2017 01:42 PM    BUN/Creatinine ratio 20 10/19/2017 01:42 PM    GFR est AA 71 10/19/2017 01:42 PM    GFR est non-AA 62 10/19/2017 01:42 PM    Calcium 9.6 10/19/2017 01:42 PM    Bilirubin, total 0.4 10/19/2017 01:42 PM    AST (SGOT) 19 10/19/2017 01:42 PM    Alk. phosphatase 90 10/19/2017 01:42 PM    Protein, total 7.2 10/19/2017 01:42 PM    Albumin 4.4 10/19/2017 01:42 PM    A-G Ratio 1.6 10/19/2017 01:42 PM    ALT (SGPT) 20 10/19/2017 01:42 PM           Assessment:     Assessment:     Diagnoses and all orders for this visit:    1. Chest tightness or pressure    2. Essential hypertension, benign  -     AMB POC EKG ROUTINE W/ 12 LEADS, INTER & REP  -     LEXISCAN/CARDIOLITE, Clinic Performed; Future  -     HOLTER MONITOR, 24 HOURS, Clinic Performed; Future    3.  Pure hypercholesterolemia  -     LEXISCAN/CARDIOLITE, Clinic Performed; Future  -     HOLTER MONITOR, 24 HOURS, Clinic Performed; Future    4. Heart murmur  -     LEXISCAN/CARDIOLITE, Clinic Performed; Future  -     HOLTER MONITOR, 24 HOURS, Clinic Performed; Future    5. Aortic stenosis, mild  -     LEXISCAN/CARDIOLITE, Clinic Performed; Future  -     HOLTER MONITOR, 24 HOURS, Clinic Performed; Future    6. Intermittent palpitations        ICD-10-CM ICD-9-CM    1. Chest tightness or pressure R07.89 786.59    2. Essential hypertension, benign I10 401.1 AMB POC EKG ROUTINE W/ 12 LEADS, INTER & REP      STRESS TEST LEXISCAN/CARDIOLITE      CARDIAC HOLTER MONITOR, 24 HOURS   3. Pure hypercholesterolemia E78.00 272.0 STRESS TEST LEXISCAN/CARDIOLITE      CARDIAC HOLTER MONITOR, 24 HOURS   4. Heart murmur R01.1 785.2 STRESS TEST LEXISCAN/CARDIOLITE      CARDIAC HOLTER MONITOR, 24 HOURS   5. Aortic stenosis, mild I35.0 424.1 STRESS TEST LEXISCAN/CARDIOLITE      CARDIAC HOLTER MONITOR, 24 HOURS   6.  Intermittent palpitations R00.2 785.1      Orders Placed This Encounter    AMB POC EKG ROUTINE W/ 12 LEADS, INTER & REP     Order Specific Question:   Reason for Exam:     Answer:   routine    LEXISCAN/CARDIOLITE, Clinic Performed     Standing Status:   Future     Standing Expiration Date:   8/1/2018     Order Specific Question:   Reason for Exam:     Answer:   Chest pain    HOLTER MONITOR, 24 HOURS, Clinic Performed     Standing Status:   Future     Number of Occurrences:   1     Standing Expiration Date:   8/1/2018     Order Specific Question:   Reason for Exam:     Answer:   palps daily in evening    influenza vaccine 2017-18, 65 yrs+,,PF, (FLUZONE HIGH-DOSE 2017-18, PF,) syrg injection     Sig: TO BE ADMINISTERED BY PHARMACIST FOR IMMUNIZATION        Plan:     Patient is a 72-year-old female who presents with waxing and waning episodes of anterior chest pressure trigger with exertional activities such as walking up an incline or a 30 minute walk with her dog. Will evaluate for ischemia with Lexiscan nuclear stress test as patient reports previous experience on a treadmill she has lost her balance/fell/feels unable to be able to complete exercise stress test.  Holter monitor applied to evaluate for arrhythmia versus premature ventricular contractions. Follow-up when testing complete. Hypertension: Mildly elevated today, discussed low-sodium diet will repeat at follow-up visit. Shanti Burnham NP    This note was created using voice recognition software. Despite editing, there may be syntax errors. [facility]    2/2/2018         Patient seen, examined by me personally. Plan discussed as detailed. Agree with note as outlined by  NP. I confirm findings in history and physical exam. No additional findings noted. Agree with plan as outlined above.      Justus Bonds MD

## 2018-02-07 NOTE — PROGRESS NOTES
Verified patient with two identifiers. Spoke with Julianne Sullivan on HIPAA regarding HOLTER results. Jennifer Kaye, please call pt to schedule a f/u with Dr. Randi Washington in the next couple of weeks.   Thanks!!

## 2018-02-22 ENCOUNTER — OFFICE VISIT (OUTPATIENT)
Dept: CARDIOLOGY CLINIC | Age: 73
End: 2018-02-22

## 2018-02-22 VITALS
WEIGHT: 185 LBS | SYSTOLIC BLOOD PRESSURE: 152 MMHG | OXYGEN SATURATION: 99 % | HEART RATE: 97 BPM | HEIGHT: 63 IN | DIASTOLIC BLOOD PRESSURE: 80 MMHG | BODY MASS INDEX: 32.78 KG/M2

## 2018-02-22 DIAGNOSIS — I35.0 NONRHEUMATIC AORTIC VALVE STENOSIS: ICD-10-CM

## 2018-02-22 DIAGNOSIS — E78.2 MIXED HYPERLIPIDEMIA: ICD-10-CM

## 2018-02-22 DIAGNOSIS — I10 ESSENTIAL HYPERTENSION, BENIGN: Primary | ICD-10-CM

## 2018-02-22 DIAGNOSIS — R01.1 HEART MURMUR: ICD-10-CM

## 2018-02-22 NOTE — PROGRESS NOTES
NAME:  Fernando Magana   :      MRN:   [id number]   PCP:  Severino Herrera MD           Subjective: The patient is a 81y.o. year old female  who returns to discuss test results. Holter monitor showed ST. Was unaware that she needs to schedule for the stress test.  Since the last visit, patient reports no change in exercise tolerance, medication intolerance, shortness of breath, PND/orthopnea wheezing, sputum, syncope, dizziness or light headedness. Endorses nonexertional chest pressure, occasional palpitation. Able to climb flight of stairs with no complain.       HOLTER MONITOR   Comment:   Sinus tachycardia, no correlation to patient symptoms. 37' ECHO  SUMMARY:  Left ventricle: Systolic function was normal. Ejection fraction was  estimated to be 60 %. There were no regional wall motion abnormalities. There was mild concentric hypertrophy. Left atrium: The atrium was mildly dilated. Aortic valve: There was mild stenosis. Valve mean gradient was 12 mmHg. Past Medical History:   Diagnosis Date    Arthritis     back,feet    Chronic pain     back,feet    GERD (gastroesophageal reflux disease)     Hypertension     Obesity     Other ill-defined conditions(799.89)     heart murmur     Stroke (HCC)     mini    Thyroid disease     hypo    Unspecified adverse effect of anesthesia     slow to wake up       Social History   Substance Use Topics    Smoking status: Never Smoker    Smokeless tobacco: Never Used    Alcohol use No      Family History   Problem Relation Age of Onset    Alcohol abuse Mother     Arthritis-osteo Mother     Cancer Father      testicular        Review of Systems  Constitutional: Negative for fever, chills, and diaphoresis. Respiratory: Negative for cough, hemoptysis, sputum production, sob and wheezing.    Cardiovascular: Negative for  orthopnea, claudication, leg swelling and PND; (+) chest pressure, palpitation  Gastrointestinal: Negative for heartburn, nausea, vomiting, blood in stool and melena. Genitourinary: Negative for dysuria and flank pain. Musculoskeletal: Negative for joint pain and back pain. Skin: Negative for rash. Neurological: Negative for focal weakness, seizures, loss of consciousness, weakness and headaches. Endo/Heme/Allergies: Does not bruise/bleed easily. Psychiatric/Behavioral: Negative for memory loss. The patient does not have insomnia. Objective:       Vitals:    02/22/18 1456 02/22/18 1521   BP: 146/64 152/80   Pulse: 97    SpO2: 99%    Weight: 185 lb (83.9 kg)    Height: 5' 3\" (1.6 m)     Body mass index is 32.77 kg/(m^2). General PE    Gen: NAD     Mental Status - Alert. General Appearance - Not in acute distress. Neck - no JVD     Chest and Lung Exam     Inspection: Accessory muscles - No use of accessory muscles in breathing. Auscultation:   Breath sounds: - Normal.     Cardiovascular   Inspection: Jugular vein - Bilateral - Inspection Normal.   Palpation/Percussion:   Apical Impulse: - Normal.   Auscultation: Rhythm - Regular. Heart Sounds - S1 WNL and S2 WNL. No S3 or S4. Murmurs & Other Heart Sounds: Auscultation of the heart reveals - 2/6 systolic murmur    Peripheral Vascular   Upper Extremity: Inspection - Bilateral - No Cyanotic nailbeds or Digital clubbing. Lower Extremity:   Palpation: Edema - Bilateral - Nonpitting dependent ankle and mid calf edema; varicosities     Abdomen: Soft, non-tender, bowel sounds are active.      Neuro: A&O times 3, CN and motor grossly WNL      Data Review:     EKG -  SR, ventricular rate 71      Allergies reviewed  Allergies   Allergen Reactions    Statins-Hmg-Coa Reductase Inhibitors Other (comments)     Severe pain and weakness    Amoxicillin Hives    Colestid [Colestipol] Myalgia    Pcn [Penicillins] Hives    Pravastatin Myalgia     Severe pain and weakness with all statins       Medications reviewed  Current Outpatient Prescriptions   Medication Sig    traMADol (ULTRAM) 50 mg tablet TAKE 1 TABLET EVERY 6 HOURS AS NEEDED    oxybutynin chloride XL (DITROPAN XL) 15 mg CR tablet Take 1 Tab by mouth daily.  levothyroxine (SYNTHROID) 50 mcg tablet TAKE 1 TABLET BY MOUTH EVERY DAY    gabapentin (NEURONTIN) 100 mg capsule Take  by mouth three (3) times daily.  fluticasone-vilanterol (BREO ELLIPTA) 200-25 mcg/dose inhaler Take 1 Puff by inhalation daily.  lisinopril-hydroCHLOROthiazide (PRINZIDE, ZESTORETIC) 20-12.5 mg per tablet TAKE 1 TABLET EVERY DAY    ANTIOX #11/OM3/DHA/EPA/LUT/YIN (OCUVITE ADULT 50+ PO) Take  by mouth daily.  Omeprazole delayed release (PRILOSEC D/R) 20 mg tablet Take 20 mg by mouth daily.  ascorbic acid-vitamin E-biotin (HAIR,SKIN & NAILS WITH BIOTIN) 7.5-7.5-1,250 mg-unit-mcg chew Take  by mouth.  OTHER Patient is taking Omega Red daily    ERIC ASPIRIN PO Take 325 mg by mouth daily. Extra strengh     OXYBUTYNIN CHLORIDE (DITROPAN PO) Take 15 mg by mouth daily.  FOLIC ACID/MULTIVITS-MIN (ONE DAILY GUMMY VITES PO) Take 2 Tabs by mouth two (2) times a day.  IBUPROFEN (ADVIL LIQUI-GEL PO) Take 400 mg by mouth three (3) times daily. 2 tablets 3 times daily    ethinyl estradiol-norethindron (JINTELI) 1-5 mg-mcg Tab Take 1-5 mcg by mouth every other day.  vitamin c-vitamin e (CRANBERRY CONCENTRATE) Cap Take 500 mg by mouth daily.  fluticasone (FLONASE) 50 mcg/Actuation nasal spray 2 Sprays by Nasal route nightly. EVERY OTHER NIGHT    fexofenadine (ALLEGRA) 180 mg tablet Take  by mouth every thirty (30) days.  vitamin e (E GEMS) 1,000 unit capsule Take 1,000 Units by mouth daily.  POTASSIUM (POTASSIMIN PO) Take 550 mg by mouth daily. 1/2 tab    influenza vaccine 2017-18, 65 yrs+,,PF, (FLUZONE HIGH-DOSE 2017-18, PF,) syrg injection TO BE ADMINISTERED BY PHARMACIST FOR IMMUNIZATION    b complex vitamins tablet Take 1 Tab by mouth daily.       GINKGO BILOBA (GINKOBA PO) Take 120 mg by mouth two (2) times a day.     No current facility-administered medications for this visit. Assessment:       ICD-10-CM ICD-9-CM    1. Essential hypertension, benign I10 401.1 AMB POC EKG ROUTINE W/ 12 LEADS, INTER & REP   2. Heart murmur R01.1 785.2 AMB POC EKG ROUTINE W/ 12 LEADS, INTER & REP        Orders Placed This Encounter    AMB POC EKG ROUTINE W/ 12 LEADS, INTER & REP     Order Specific Question:   Reason for Exam:     Answer:   ROUTINE       Patient Active Problem List   Diagnosis Code    Essential hypertension, benign I10    Pure hypercholesterolemia E78.00    Overweight(278.02) E66.3    Unspecified hypothyroidism E03.9    DJD (degenerative joint disease) M19.90    Overactive bladder N32.81    Diverticulosis K57.90    Obesity E66.9    DDD (degenerative disc disease), lumbar M51.36    Heart murmur R01.1    Advanced care planning/counseling discussion Z71.89    Nonrheumatic aortic valve stenosis I35.0       Plan:     Atypical CP  Holter monitor 02'18 showed ST, no arrhythmia  \"unaware\" she has an order for [employer]. Still c/o occasional palpitations, chest pressure, will reorder [employer]   Defers initiation of low dose BB until after test result    HTN  Mildly elevated in clinic. Reports home log SBP 130s. Continue Lisinopril/HCTZ  Recommend low sodium diet    Aortic Murmur  Echo 06'17 EF 60%, mild AS  Stable    HLD  10'17 , was nonfasting per pt. States she is intolerant of statin. Defers lab draw/initiation of Zetia to PCP, Dr. Jackson Telles. Will schedule for Lexiscan, continue current care and follow up when testing complete within the next 2 weeks. Jose Puentes NP      [facility]    2/22/2018         Patient seen, examined by me personally. Plan discussed as detailed. Agree with note as outlined by  NP. I confirm findings in history and physical exam. No additional findings noted. Agree with plan as outlined above.      Alexis Malcolm MD

## 2018-02-22 NOTE — MR AVS SNAPSHOT
[address] [facility] 
[phone] Patient: Amelia Gonzalez MRN:  [id number] Visit Information Date & Time Provider Department Dept. Phone Encounter #  
 2/22/2018  3:30 PM 1700 [facility] [phone] [id number] Your Appointments 3/15/2018  9:00 AM  
NUCLEAR MEDICINE with NUCLEAR, [facility] Appt Note: Dr. Ronan Foss [[id number]] (Order [id number])  5'3\" 185,jaa  
 [address] [facility]  
[phone] [address] [facility]  
  
    
 5/31/2018 11:00 AM  
ROUTINE CARE with Yesika Sneed, [address] [place] [address],[address] Floor [facility] Appt Note: 6 month follow up  
 [facility] [address] [address]  
[address] [facility] Upcoming Health Maintenance Date Due Bone Densitometry 10/20/2013 GLAUCOMA SCREENING Q2Y 9/15/2016 MEDICARE YEARLY EXAM 5/25/2017 BREAST CANCER SCRN MAMMOGRAM 11/27/2018 COLONOSCOPY 2/28/2024 DTaP/Tdap/Td series (2 - Td) 4/7/2024 Allergies as of 2/22/2018  Review Complete On: 2/22/2018 By: Atsrid Shannon Severity Noted Reaction Type Reactions Statins-hmg-coa Reductase Inhibitors High 02/25/2014    Other (comments) Severe pain and weakness Amoxicillin  10/05/2009    Hives Colestid [Colestipol]  11/05/2014    Myalgia Pcn [Penicillins]  04/03/2012    Hives Pravastatin  10/05/2010    Myalgia Severe pain and weakness with all statins Current Immunizations  Reviewed on 11/23/2016 Name Date Influenza High Dose Vaccine PF 9/28/2017, 9/29/2016, 9/30/2015, 9/20/2014 Influenza Vaccine 10/1/2012 Pneumococcal Conjugate (PCV-13) 9/2/2015 Pneumococcal Polysaccharide (PPSV-23) 11/23/2016 Pneumococcal Vaccine (Unspecified Type) 11/3/2009 Tdap 4/7/2014 Zoster Vaccine, Live 10/15/2012 Not reviewed this visit You Were Diagnosed With   
  
 Codes Comments Essential hypertension, benign    -  Primary ICD-10-CM: I10 
ICD-9-CM: 401.1 Heart murmur     ICD-10-CM: R01.1 ICD-9-CM: 325. 2 Vitals BP Pulse Height(growth percentile) Weight(growth percentile) SpO2 BMI  
 152/80 (BP 1 Location: Left leg, BP Patient Position: Sitting) 97 5' 3\" (1.6 m) 185 lb (83.9 kg) 99% 32.77 kg/m2 OB Status Smoking Status Postmenopausal Never Smoker Vitals History BMI and BSA Data Body Mass Index Body Surface Area 32.77 kg/m 2 1.93 m 2 Preferred Pharmacy Pharmacy Name Phone [facility] [address] [place], [place] [phone] Your Updated Medication List  
  
   
This list is accurate as of 2/22/18  4:21 PM.  Always use your most recent med list. ADVIL LIQUI-GEL PO Take 400 mg by mouth three (3) times daily. 2 tablets 3 times daily  
  
 b complex vitamins tablet Take 1 Tab by mouth daily. ERIC ASPIRIN PO Take 325 mg by mouth daily. Extra strengh BREO ELLIPTA 200-25 mcg/dose inhaler Generic drug:  fluticasone-vilanterol Take 1 Puff by inhalation daily. CRANBERRY CONCENTRATE Cap Generic drug:  vitamin c-vitamin e Take 500 mg by mouth daily. * DITROPAN PO Take 15 mg by mouth daily. * oxybutynin chloride XL 15 mg CR tablet Commonly known as:  DITROPAN XL Take 1 Tab by mouth daily. fexofenadine 180 mg tablet Commonly known as:  Erminio Craft Take  by mouth every thirty (30) days. fluticasone 50 mcg/actuation nasal spray Commonly known as:  Memory Lust 2 Sprays by Nasal route nightly. EVERY OTHER NIGHT FLUZONE HIGH-DOSE 2017-18 (PF) Syrg injection Generic drug:  influenza vaccine 2017-18 (65 yrs+)(PF)  
TO BE ADMINISTERED BY PHARMACIST FOR IMMUNIZATION  
  
 gabapentin 100 mg capsule Commonly known as:  NEURONTIN Take  by mouth three (3) times daily. GINKOBA PO Take 120 mg by mouth two (2) times a day. HAIR, SKIN, NAILS WITH BIOTIN 7.5-7.5-1,250 mg-unit-mcg Chew Generic drug:  ascorbic acid-vitamin E-biotin Take  by mouth. JINTELI 1-5 mg-mcg Tab Generic drug:  norethindrone acetate-ethinyl estradiol Take 1-5 mcg by mouth every other day. levothyroxine 50 mcg tablet Commonly known as:  SYNTHROID  
TAKE 1 TABLET BY MOUTH EVERY DAY  
  
 lisinopril-hydroCHLOROthiazide 20-12.5 mg per tablet Commonly known as:  PRINZIDE, ZESTORETIC  
TAKE 1 TABLET EVERY DAY  
  
 OCUVITE ADULT 50+ PO Take  by mouth daily. Omeprazole delayed release 20 mg tablet Commonly known as:  PRILOSEC D/R Take 20 mg by mouth daily. ONE DAILY GUMMY VITES PO Take 2 Tabs by mouth two (2) times a day. OTHER Patient is taking Omega Red daily POTASSIMIN PO Take 550 mg by mouth daily. 1/2 tab  
  
 traMADol 50 mg tablet Commonly known as:  ULTRAM  
TAKE 1 TABLET EVERY 6 HOURS AS NEEDED  
  
 vitamin e 1,000 unit capsule Commonly known as:  E GEMS Take 1,000 Units by mouth daily. * Notice: This list has 2 medication(s) that are the same as other medications prescribed for you. Read the directions carefully, and ask your doctor or other care provider to review them with you. We Performed the Following AMB POC EKG ROUTINE W/ 12 LEADS, INTER & REP [75025 CPT(R)] Introducing [facility]! Shirin Caballero introduces [employer] patient portal. Now you can access parts of your medical record, email your doctor's office, and request medication refills online. 1. In your internet browser, go to [email or web address] 2. Click on the First Time User? Click Here link in the Sign In box. You will see the New Member Sign Up page. 3. Enter your Hullabalu Access Code exactly as it appears below. You will not need to use this code after youve completed the sign-up process. If you do not sign up before the expiration date, you must request a new code. · Hullabalu Access Code: [email or web address] Expires: 5/2/2018  1:57 PM 
 
4. Enter the last four digits of your Social Security Number (xxxx) and Date of Birth (mm/dd/yyyy) as indicated and click Submit. You will be taken to the next sign-up page. 5. Create a Hullabalu ID. This will be your Hullabalu login ID and cannot be changed, so think of one that is secure and easy to remember. 6. Create a Hullabalu password. You can change your password at any time. 7. Enter your Password Reset Question and Answer. This can be used at a later time if you forget your password. 8. Enter your e-mail address. You will receive e-mail notification when new information is available in [address] 9. Click Sign Up. You can now view and download portions of your medical record. 10. Click the Download Summary menu link to download a portable copy of your medical information. If you have questions, please visit the Frequently Asked Questions section of the Hullabalu website. Remember, Hullabalu is NOT to be used for urgent needs. For medical emergencies, dial 911. Now available from your iPhone and Android! Please provide this summary of care documentation to your next provider. Your primary care clinician is listed as Venancio PRATER. If you have any questions after today's visit, please call [phone].

## 2018-02-22 NOTE — PROGRESS NOTES
Chief Complaint   Patient presents with    Follow-up     2 WEEK FOLLOW UP / CHEST PRESSURE     1. Have you been to the ER, urgent care clinic since your last visit? Hospitalized since your last visit? NO    2. Have you seen or consulted any other health care providers outside of the [address] [place] [address] since your last visit? Include any pap smears or colon screening.  NO

## 2018-02-25 RX ORDER — LISINOPRIL AND HYDROCHLOROTHIAZIDE 12.5; 2 MG/1; MG/1
TABLET ORAL
Qty: 90 TAB | Refills: 3 | Status: SHIPPED | OUTPATIENT
Start: 2018-02-25 | End: 2018-11-20 | Stop reason: SDUPTHER

## 2018-03-15 ENCOUNTER — CLINICAL SUPPORT (OUTPATIENT)
Dept: CARDIOLOGY CLINIC | Age: 73
End: 2018-03-15

## 2018-03-15 DIAGNOSIS — I35.0 AORTIC STENOSIS, MILD: ICD-10-CM

## 2018-03-15 DIAGNOSIS — E78.00 PURE HYPERCHOLESTEROLEMIA: ICD-10-CM

## 2018-03-15 DIAGNOSIS — R01.1 HEART MURMUR: ICD-10-CM

## 2018-03-15 DIAGNOSIS — I10 ESSENTIAL HYPERTENSION, BENIGN: ICD-10-CM

## 2018-03-19 ENCOUNTER — TELEPHONE (OUTPATIENT)
Dept: CARDIOLOGY CLINIC | Age: 73
End: 2018-03-19

## 2018-03-19 NOTE — TELEPHONE ENCOUNTER
Per pt, she returned your phone call.  Please call her back to discuss test results    Thanks,    Maureen

## 2018-06-10 NOTE — PROGRESS NOTES
HISTORY OF PRESENT ILLNESS  Sapphire Steel is a 81 y.o. female. HPI   6 month f/u HTN HLD hypopthyroidism, chronic pain r/t oa of hands , back and feet. Had NST for cp/palpitations 3 mos ago at [facility]--normal.   Declined trial of welchol for HLD ( statin intolerant)  On tramadol one bid  And neurontin  for low back pain with right sciatica   reviewed on 6-10-18  Needs to sign pain contract------------------------signed today  This is a chronic problem that is not changed. Per review of available records and patients , there are not sign of overuse, misuse, diversion, or concerning side effects. Today we reviewed: the risk of overdose, addiction, and dependency the risks and benefits of continuing with a narcotic based pain regimen  The following changes were made to the patients current treatment plan: nothing, medications refilled. Some increased right leg edema, has varicose veins  Saw allergy MD now on steroid inhaler--breathing better. On Breo ellipta. Has some right chest wall pain             Last OV  Takes tramadol tid for back , hands and feet pain  Saw Dr Elana Remy for increase low back pain and sciatica of right leg--s/p [id number] which has helped  Lowered tramadol to 2 tabs per day and taking gabapentin now  Had recent labs--normal cbc cmp tsh  LDL still elevated at 178 -statin intolerant and not able to take zetia or cholestipol. Labs were drawn nonfasting  Sees gyn MD for well woman care---goes q 2yrs due next year.  Pt declines further mammograms    Patient Active Problem List    Diagnosis Date Noted    Nonrheumatic aortic valve stenosis 06/08/2017    Heart murmur 05/24/2016    Advanced care planning/counseling discussion 05/24/2016    DDD (degenerative disc disease), lumbar 10/04/2013    Obesity 04/05/2013    Essential hypertension, benign 03/31/2010    Pure hypercholesterolemia 03/31/2010    Overweight 03/31/2010    Unspecified hypothyroidism 03/31/2010    DJD (degenerative joint disease) 03/31/2010    Overactive bladder 03/31/2010    Diverticulosis 03/31/2010     Current Outpatient Prescriptions   Medication Sig Dispense Refill    lisinopril-hydroCHLOROthiazide (PRINZIDE, ZESTORETIC) 20-12.5 mg per tablet TAKE 1 TABLET EVERY DAY 90 Tab 3    influenza vaccine 2017-18, 65 yrs+,,PF, (FLUZONE HIGH-DOSE 2017-18, PF,) syrg injection TO BE ADMINISTERED BY PHARMACIST FOR IMMUNIZATION      traMADol (ULTRAM) 50 mg tablet TAKE 1 TABLET EVERY 6 HOURS AS NEEDED 120 Tab 3    oxybutynin chloride XL (DITROPAN XL) 15 mg CR tablet Take 1 Tab by mouth daily. 90 Tab 3    levothyroxine (SYNTHROID) 50 mcg tablet TAKE 1 TABLET BY MOUTH EVERY DAY 90 Tab 3    gabapentin (NEURONTIN) 100 mg capsule Take  by mouth three (3) times daily.  fluticasone-vilanterol (BREO ELLIPTA) 200-25 mcg/dose inhaler Take 1 Puff by inhalation daily.  ANTIOX #11/OM3/DHA/EPA/LUT/YIN (OCUVITE ADULT 50+ PO) Take  by mouth daily.  Omeprazole delayed release (PRILOSEC D/R) 20 mg tablet Take 20 mg by mouth daily.  ascorbic acid-vitamin E-biotin (HAIR,SKIN & NAILS WITH BIOTIN) 7.5-7.5-1,250 mg-unit-mcg chew Take  by mouth.  OTHER Patient is taking Omega Red daily      ERIC ASPIRIN PO Take 325 mg by mouth daily. Extra strengh       OXYBUTYNIN CHLORIDE (DITROPAN PO) Take 15 mg by mouth daily.  FOLIC ACID/MULTIVITS-MIN (ONE DAILY GUMMY VITES PO) Take 2 Tabs by mouth two (2) times a day.  b complex vitamins tablet Take 1 Tab by mouth daily.  IBUPROFEN (ADVIL LIQUI-GEL PO) Take 400 mg by mouth three (3) times daily. 2 tablets 3 times daily      ethinyl estradiol-norethindron (JINTELI) 1-5 mg-mcg Tab Take 1-5 mcg by mouth every other day.  vitamin c-vitamin e (CRANBERRY CONCENTRATE) Cap Take 500 mg by mouth daily.  fluticasone (FLONASE) 50 mcg/Actuation nasal spray 2 Sprays by Nasal route nightly.  EVERY OTHER NIGHT      fexofenadine (ALLEGRA) 180 mg tablet Take  by mouth every thirty (30) days.  vitamin e (E GEMS) 1,000 unit capsule Take 1,000 Units by mouth daily.  GINKGO BILOBA (GINKOBA PO) Take 120 mg by mouth two (2) times a day.  POTASSIUM (POTASSIMIN PO) Take 550 mg by mouth daily. 1/2 tab       Allergies   Allergen Reactions    Statins-Hmg-Coa Reductase Inhibitors Other (comments)     Severe pain and weakness    Amoxicillin Hives    Colestid [Colestipol] Myalgia    Pcn [Penicillins] Hives    Pravastatin Myalgia     Severe pain and weakness with all statins      Lab Results  Component Value Date/Time   Glucose 98 10/19/2017 01:42 PM   LDL, calculated 178 (H) 10/19/2017 01:42 PM   Creatinine 0.94 10/19/2017 01:42 PM      Lab Results  Component Value Date/Time   Cholesterol, total 258 (H) 10/19/2017 01:42 PM   Cholesterol (POC) 204 04/03/2012 10:59 AM   HDL Cholesterol 56 10/19/2017 01:42 PM   LDL, calculated 178 (H) 10/19/2017 01:42 PM   LDL Cholesterol (POC) 131 04/03/2012 10:59 AM   Triglyceride 121 10/19/2017 01:42 PM   Triglycerides (POC) 142 04/03/2012 10:59 AM     Lab Results  Component Value Date/Time   GFR est non-AA 62 10/19/2017 01:42 PM   GFR est AA 71 10/19/2017 01:42 PM   Creatinine 0.94 10/19/2017 01:42 PM   BUN 19 10/19/2017 01:42 PM   Sodium 139 10/19/2017 01:42 PM   Potassium 4.7 10/19/2017 01:42 PM   Chloride 98 10/19/2017 01:42 PM   CO2 24 10/19/2017 01:42 PM        ROS    Physical Exam   Constitutional: She appears well-developed and well-nourished. Appears stated age   Cardiovascular: Normal rate, regular rhythm and normal heart sounds. Exam reveals no gallop and no friction rub. No murmur heard. Pulmonary/Chest: Effort normal and breath sounds normal. No respiratory distress. She has no wheezes. She has no rales. She exhibits no tenderness. Abdominal: Soft. Bowel sounds are normal.   Musculoskeletal: She exhibits no edema. Neurological: She is alert. Psychiatric: She has a normal mood and affect.    Nursing note and vitals reviewed. ASSESSMENT and PLAN  Diagnoses and all orders for this visit:    1. Essential hypertension, benign   controlled  2. Pure hypercholesterolemia   Statin intolerant , pt will work on diet and exercise  3. Primary osteoarthritis involving multiple joints   Refilled tramadol  4. Hypothyroidism, unspecified type   euthyroid clinically   5. Encounter for long-term opiate analgesic use   UDS today   Pt signed drug contract     reviewed    Refilled tramadol 120 tabs plus 1 refill ---on 7-18-18  6.  Hand arthritis  -     traMADol (ULTRAM) 50 mg tablet; TAKE 1 TABLET EVERY 6 HOURS AS NEEDED--may fill on 7-18-18    rtc 3 months  Follow-up Disposition: Not on File

## 2018-06-11 ENCOUNTER — OFFICE VISIT (OUTPATIENT)
Dept: INTERNAL MEDICINE CLINIC | Age: 73
End: 2018-06-11

## 2018-06-11 ENCOUNTER — HOSPITAL ENCOUNTER (OUTPATIENT)
Dept: LAB | Age: 73
Discharge: HOME | End: 2018-06-11
Payer: MEDICARE

## 2018-06-11 VITALS
TEMPERATURE: 97.9 F | HEIGHT: 63 IN | DIASTOLIC BLOOD PRESSURE: 71 MMHG | OXYGEN SATURATION: 97 % | WEIGHT: 185 LBS | HEART RATE: 64 BPM | BODY MASS INDEX: 32.78 KG/M2 | SYSTOLIC BLOOD PRESSURE: 128 MMHG

## 2018-06-11 DIAGNOSIS — E03.9 HYPOTHYROIDISM, UNSPECIFIED TYPE: ICD-10-CM

## 2018-06-11 DIAGNOSIS — Z79.891 ENCOUNTER FOR LONG-TERM OPIATE ANALGESIC USE: ICD-10-CM

## 2018-06-11 DIAGNOSIS — I83.90 VARICOSE VEIN OF LEG: ICD-10-CM

## 2018-06-11 DIAGNOSIS — M19.049 HAND ARTHRITIS: ICD-10-CM

## 2018-06-11 DIAGNOSIS — M15.9 PRIMARY OSTEOARTHRITIS INVOLVING MULTIPLE JOINTS: ICD-10-CM

## 2018-06-11 DIAGNOSIS — Z02.89 PAIN MEDICATION AGREEMENT: ICD-10-CM

## 2018-06-11 DIAGNOSIS — I10 ESSENTIAL HYPERTENSION, BENIGN: Primary | ICD-10-CM

## 2018-06-11 DIAGNOSIS — E78.00 PURE HYPERCHOLESTEROLEMIA: ICD-10-CM

## 2018-06-11 PROCEDURE — 82570 ASSAY OF URINE CREATININE: CPT

## 2018-06-11 RX ORDER — TRAMADOL HYDROCHLORIDE 50 MG/1
TABLET ORAL
Qty: 120 TAB | Refills: 1 | Status: SHIPPED | OUTPATIENT
Start: 2018-06-11 | End: 2018-07-23 | Stop reason: SDUPTHER

## 2018-06-11 NOTE — MR AVS SNAPSHOT
[address] 
[phone] Patient: Triston Cha MRN:  [id number] Visit Information Date & Time Provider Department Dept. Phone Encounter #  
 6/11/2018 10:00 AM Johanna Arellano [address] [facility] [address] [phone] [id number] Follow-up Instructions Return in about 5 months (around 11/11/2018) for htn chronic pain hld labs. Upcoming Health Maintenance Date Due Bone Densitometry 10/20/2013 GLAUCOMA SCREENING Q2Y 9/15/2016 MEDICARE YEARLY EXAM 3/14/2018 Influenza Age 5 to Adult 8/1/2018 BREAST CANCER SCRN MAMMOGRAM 11/27/2018 COLONOSCOPY 2/28/2024 DTaP/Tdap/Td series (2 - Td) 4/7/2024 Allergies as of 6/11/2018  Review Complete On: 6/11/2018 By: Ana Estrella LPN Severity Noted Reaction Type Reactions Statins-hmg-coa Reductase Inhibitors High 02/25/2014    Other (comments) Severe pain and weakness Amoxicillin  10/05/2009    Hives Colestid [Colestipol]  11/05/2014    Myalgia Pcn [Penicillins]  04/03/2012    Hives Pravastatin  10/05/2010    Myalgia Severe pain and weakness with all statins Current Immunizations  Reviewed on 11/23/2016 Name Date Influenza High Dose Vaccine PF 9/28/2017, 9/29/2016, 9/30/2015, 9/20/2014 Influenza Vaccine 10/1/2012 Pneumococcal Conjugate (PCV-13) 9/2/2015 Pneumococcal Polysaccharide (PPSV-23) 11/23/2016 Pneumococcal Vaccine (Unspecified Type) 11/3/2009 Tdap 4/7/2014 Zoster Vaccine, Live 10/15/2012 Not reviewed this visit You Were Diagnosed With   
  
 Codes Comments Essential hypertension, benign    -  Primary ICD-10-CM: I10 
ICD-9-CM: 401.1 Pure hypercholesterolemia     ICD-10-CM: E78.00 ICD-9-CM: 272.0 Primary osteoarthritis involving multiple joints     ICD-10-CM: M15.0 ICD-9-CM: 715.09 Hypothyroidism, unspecified type     ICD-10-CM: E03.9 ICD-9-CM: 244.9 Encounter for long-term opiate analgesic use     ICD-10-CM: A75.268 ICD-9-CM: V58.69 Hand arthritis     ICD-10-CM: M19.049 ICD-9-CM: 716.94 Varicose vein of leg     ICD-10-CM: I83.90 ICD-9-CM: 454.9 Vitals BP Pulse Temp Height(growth percentile) Weight(growth percentile) SpO2  
 128/71 (BP 1 Location: Left arm, BP Patient Position: Sitting) 64 97.9 °F (36.6 °C) (Oral) 5' 3\" (1.6 m) 185 lb (83.9 kg) 97% BMI OB Status Smoking Status 32.77 kg/m2 Postmenopausal Never Smoker BMI and BSA Data Body Mass Index Body Surface Area 32.77 kg/m 2 1.93 m 2 Preferred Pharmacy Pharmacy Name Phone [facility] [address] [place] [address] [phone] Your Updated Medication List  
  
   
This list is accurate as of 6/11/18 11:16 AM.  Always use your most recent med list. ADVIL LIQUI-GEL PO Take 400 mg by mouth three (3) times daily. 2 tablets 3 times daily  
  
 b complex vitamins tablet Take 1 Tab by mouth daily. ERIC ASPIRIN PO Take 325 mg by mouth daily. Extra strengh BREO ELLIPTA 200-25 mcg/dose inhaler Generic drug:  fluticasone-vilanterol Take 1 Puff by inhalation daily. CRANBERRY CONCENTRATE Cap Generic drug:  vitamin c-vitamin e Take 500 mg by mouth daily. * DITROPAN PO Take 15 mg by mouth daily. * oxybutynin chloride XL 15 mg CR tablet Commonly known as:  DITROPAN XL Take 1 Tab by mouth daily. fexofenadine 180 mg tablet Commonly known as:  Lennis Mulligan Take  by mouth every thirty (30) days. fluticasone 50 mcg/actuation nasal spray Commonly known as:  Nata Roe 2 Sprays by Nasal route nightly. EVERY OTHER NIGHT FLUZONE HIGH-DOSE 2017-18 (PF) Syrg injection Generic drug:  influenza vaccine 2017-18 (65 yrs+)(PF)  
TO BE ADMINISTERED BY PHARMACIST FOR IMMUNIZATION  
  
 gabapentin 100 mg capsule Commonly known as:  NEURONTIN Take  by mouth three (3) times daily. GINKOBA PO Take 120 mg by mouth two (2) times a day. HAIR, SKIN, NAILS WITH BIOTIN 7.5-7.5-1,250 mg-unit-mcg Chew Generic drug:  ascorbic acid-vitamin E-biotin Take  by mouth. JINTELI 1-5 mg-mcg Tab Generic drug:  norethindrone acetate-ethinyl estradiol Take 1-5 mcg by mouth every other day. levothyroxine 50 mcg tablet Commonly known as:  SYNTHROID  
TAKE 1 TABLET BY MOUTH EVERY DAY  
  
 lisinopril-hydroCHLOROthiazide 20-12.5 mg per tablet Commonly known as:  PRINZIDE, ZESTORETIC  
TAKE 1 TABLET EVERY DAY  
  
 OCUVITE ADULT 50+ PO Take  by mouth daily. Omeprazole delayed release 20 mg tablet Commonly known as:  PRILOSEC D/R Take 20 mg by mouth daily. ONE DAILY GUMMY VITES PO Take 2 Tabs by mouth two (2) times a day. OTHER Patient is taking Omega Red daily POTASSIMIN PO Take 550 mg by mouth daily. 1/2 tab  
  
 traMADol 50 mg tablet Commonly known as:  ULTRAM  
TAKE 1 TABLET EVERY 6 HOURS AS NEEDED--may fill on 7-18-18  
  
 vitamin e 1,000 unit capsule Commonly known as:  E GEMS Take 1,000 Units by mouth daily. * Notice: This list has 2 medication(s) that are the same as other medications prescribed for you. Read the directions carefully, and ask your doctor or other care provider to review them with you. Prescriptions Printed Refills  
 traMADol (ULTRAM) 50 mg tablet 1 Sig: TAKE 1 TABLET EVERY 6 HOURS AS NEEDED--may fill on 7-18-18 Class: Print We Performed the Following REFERRAL TO CARDIOLOGY [VVP72 Custom] Follow-up Instructions Return in about 5 months (around 11/11/2018) for htn chronic pain hld labs. Referral Information Referral ID Referred By Referred To  
  
 [id number] Jolene Hernandez MD   
   [address] [place] [address] [facility] Phone: [phone] Fax: [phone] Visits Status Start Date End Date 1 New Request 6/11/18 6/11/19 If your referral has a status of pending review or denied, additional information will be sent to support the outcome of this decision. Introducing [facility]! Margaritasunday Ervinxu introduces [employer] patient portal. Now you can access parts of your medical record, email your doctor's office, and request medication refills online. 1. In your internet browser, go to [email or web address] 2. Click on the First Time User? Click Here link in the Sign In box. You will see the New Member Sign Up page. 3. Enter your Kidblog Access Code exactly as it appears below. You will not need to use this code after youve completed the sign-up process. If you do not sign up before the expiration date, you must request a new code. · Kid[employer] Access Code: [email or web address] Expires: 9/9/2018 11:16 AM 
 
4. Enter the last four digits of your Social Security Number (xxxx) and Date of Birth (mm/dd/yyyy) as indicated and click Submit. You will be taken to the next sign-up page. 5. Create a Kid[employer] ID. This will be your Kid[employer] login ID and cannot be changed, so think of one that is secure and easy to remember. 6. Create a [employer] password. You can change your password at any time. 7. Enter your Password Reset Question and Answer. This can be used at a later time if you forget your password. 8. Enter your e-mail address. You will receive e-mail notification when new information is available in [address] 9. Click Sign Up. You can now view and download portions of your medical record. 10. Click the Download Summary menu link to download a portable copy of your medical information. If you have questions, please visit the Frequently Asked Questions section of the Kid[employer] website. Remember, Kid[employer] is NOT to be used for urgent needs. For medical emergencies, dial 911. Now available from your iPhone and Android! Please provide this summary of care documentation to your next provider. Your primary care clinician is listed as Estefany PRATER. If you have any questions after today's visit, please call [phone].

## 2018-06-11 NOTE — PROGRESS NOTES
Chief Complaint   Patient presents with    Pain (Chronic)     back and legs    Hypertension     6 month follow up    Cholesterol Problem     6 month follow up    Medication Evaluation     Tramadol

## 2018-06-11 NOTE — LETTER
Name:Harsha Nolasco TGN:[birth year] MR #:[id number] 2 Ayala Gonzalez Page 1 of 5 CONTROLLED SUBSTANCE AGREEMENT I may be prescribed medications that are controlled substances as part  of my treatment plan for management of my medical condition(s). The goal of my treatment plan is to maintain and/or improve my health and wellbeing. Because controlled substances have an increased risk of abuse or harm, continual re-evaluation is needed determine if the goals of my treatment plan are being met for my safety and the safety of others. Maryann Vasquez  am entering into this Controlled Substance Agreement with my provider, Dr. Estrella Clark at [facility] . I understand that successful treatment requires mutual trust and honesty between me and my provider. I understand that there are state and federal laws and regulations which apply to the medications that my provider may prescribe that must be followed. I understand there are risks and benefits ts of taking the medicines that my provider may prescribe. I understand and agree that following this Agreement is necessary in continuing my provider-patient relationship and success of my treatment plan. As a part of my treatment plan, I agree to the following: COMMUNICATION: 
 
1. I will communicate fully with my provider about my medical condition(s), including the effect on my daily life and how well my medications are helping. I will tell my provider all of the medications that I take for any reason, including medications I receive from another health care provider, and will notify my provider about all issues, problems or concerns, including any side effects, which may be related to my medications. I understand that this information allows my provider to adjust my treatment plan to help manage my medical condition. I understand that this information will become part of my permanent medical record. 2. I will notify my provider if I have a history of alcohol/drug misuse/addiction or if I have had treatment for alcohol/drug addiction in the past, or if I have a new problem with or concern about alcohol/drug use/addiction, because this increases the likelihood of high risk behaviors and may lead to serious medical conditions. 3. Females Only: I will notify my provider if I am or become pregnant, or if I intend to become pregnant, or if I intend to breastfeed. I understand that communication of these issues with my provider is important, due to possible effects my medication could have on an unborn fetus or breastfeeding child. Initials_____ Name:.Julia Skaggs TRINI:[birth year]18 MR #:[id number] 2 Ayala Gonzalez Page 2 of 5 MISUSE OF MEDICATIONS / DRUGS: 
 
1. I agree to take all controlled substances as prescribed, and will not misuse or abuse any controlled substances prescribed by my provider. For my safety, I will not increase the amount of medicine I take without first talking with and getting permission from my provider. 2. If I have a medical emergency, another health care provider may prescribe me medication. If I seek emergency treatment, I will notify my provider within seventy-two (72) hours. 3. I understand that my provider may discuss my use and/or possible misuse/abuse of controlled substances and alcohol, as appropriate, with any health care provider involved in my care, pharmacist or legal authority. ILLEGAL DRUGS: 
 
1. I will not use illegal drugs of any kind, including but not limited to marijuana, heroin, cocaine, or any prescription drug which is not prescribed to me. DRUG DIVERSION / PRESCRIPTION FRAUD: 
 
1. I will not share, sell, trade, give away, or otherwise misuse my prescriptions or medications. 2. I will not alter any prescriptions provided to me by my provider.  
 
SINGLE PROVIDER: 
 
 1. I agree that all controlled substances that I take will be prescribed only by my provider (or his/her covering provider) under this Agreement. This agreement does not prevent me from seeking emergency medical treatment or receiving pain management related to a surgery. PROTECTING MEDICATIONS: 
 
1. I am responsible for keeping my prescriptions and medications in a safe and secure place including safeguarding them from loss or theft. I understand that lost, stolen or damaged/destroyed prescriptions or medications will not be replaced. Initials____ Name:Harsha Quezada USF:6/39/2041 MR #:[id number] 2 Ayala Gonzalez Page 3 of 5 PRESCRIPTION RENEWALS/REFILLS: 
 
1. I will follow my controlled substance medication schedule as prescribed by my provider. 2. I understand and agree that I will make any requests for renewals or refills of my prescriptions only at the time of an office visit or during my providers regular office hours subject to the prescription refill requirements of the individual practice. 3. I understand that my provider may not call in prescriptions for controlled substances to my pharmacy. 4. I understand that my provider may adjust or discontinue these medications as deemed appropriate for my medical treatment plan. This Agreement does not guarantee the prescription of controlled medications. 5. I agree that if my medications are adjusted or discontinued, I will properly dispose of any remaining medications. I understand that I will be required to dispose of any remaining controlled medications prior to being provided with any prescriptions for other controlled medications. 6. I understand that the renewal of my prescription depends on my medical condition, my consistent participation, and my adherence with my treatment plan and this Agreement.  
 
7. I understand that if I do not keep an appointment with my provider, I may not receive a renewal or refill for my controlled substance medication. PRESCRIPTION MONITORING / DRUG TESTIN. I understand that my provider may require me to provide urine, saliva or blood for testing at any time. I understand that this testing will be used to monitor for safety and adherence with my treatment plan and this Agreement. 2. I understand that my provider may ask me to provide an observed urine specimen, which means that a nurse or other health care provider may watch me provide urine, and I agree to cooperate if I am asked to provide an observed specimen. 3. I understand that if I do not provide urine, saliva or blood samples within two (2) hours of my providers request, or other timeframe decided by my provider, my treatment plan could be changed, or my prescriptions and medications may be changed or ended. 4. I understand that urine, saliva and blood test results will be a part of my permanent medical record. Initials_____ Name:Harsha Jerry YTT:3/15/[birth year]6 MR #:[id number] Ayala Gonzalez Page 4 of 5 
 
5. I understand that my provider is required to obtain a copy of my State Prescription Monitoring Program () Report at any time in order to safely prescribe medications. 6. I will bring all of my prescribed controlled substance medications in their original bottles to all of my scheduled appointments. 7. I understand that my provider may ask me to come to the practice with all of my prescribed medications for a random pill count at any time. I agree to cooperate if I am asked to come in for a random pill count. I understand that if I do not arrive in the timeframe decided by my provider, my treatment plan could be changed, or my prescriptions and medications may be changed or ended.  
 
COOPERATION WITH INVESTIGATIONS: 
 
1. I authorize my provider and my pharmacy to cooperate fully with any local, state, or federal law enforcement agency in the investigation of any possible misuse, sale, or other diversion of my controlled substance prescriptions or medications. RISKS: 
 
1. I understand that my level of consciousness may be affected from the use of controlled substances, and I understand that there are risks, benefits, effects and potential alternatives (including no treatment) to the medications that my provider has prescribed. 2. I understand that I may become drowsy, tired, dizzy, constipated, and sick to my stomach, or have changes in my mood or in my sleep while taking my medications. I have talked with my provider about these possible side effects, risks, benefits, and alternative treatments, and my provider has answered all of my questions. 3. I understand that I should not suddenly stop taking my medications without first speaking with my provider. I understand that if I suddenly stop taking my medications, I may experience nausea, vomiting, sweating,anxiety, sleeplessness, itching or other uncomfortable feelings. 4. I will not take my medications with alcohol of any kind, including beer, wine or liquor. I understand that drinking alcohol with my medications increases the chances of side effects, including breathing problems or even death. 5. I understand that if I have a history of alcoholism or other drug addiction I may be at increased risk of addiction to my medications. Signs of addiction might include craving, compulsive use, and continued use despite harm. Since addiction is a disease, I understand my provider may decide to change my medications and refer me to appropriate treatment services. I understand that this information would become part of my permanent medical record. Initials_____ Name:Harsha Waters  E:8/78/[birth year]32 MR #:[id number] Ayala Gonzalez Page 5 of 5  
 
 
6.  Females only: Children born to women who regularly take controlled substances are likely to have physical problems and suffer withdrawal symptoms at birth. If I am of child-bearing age, I understand that I should use safe and effective birth control while taking any controlled substances to avoid the impact of medications on an unborn fetus or  child. I agree to notify my provider immediately if I should become pregnant so that my treatment plan can be adjusted. 7. Males only: I understand that chronic use of controlled substances has been associated with low testosterone levels in males which may affect my mood, stamina, sexual desire, and general health. I understand that my provider may order the appropriate laboratory test to determine my testosterone level,and I agree to this testing. ADHERENCE: 
 
1. I understand that if I do not adhere to this Agreement in any way, my provider may change my prescriptions, stop prescribing controlled substances or end our provider-patient relationship. 2. If my provider decides to stop prescribing medication, or decides to end our provider-patient relationship,my provider may require that I taper my medications slowly. If necessary, my provider may also provide a prescription for other medications to treat my withdrawal symptoms. UNDERSTANDING THIS AGREEMENT: 
 
I understand that my provider may adjust or stop my prescriptions for controlled substances based on my medical condition and my treatment plan. I understand that this Agreement does not guarantee that I will be prescribed medications or controlled substances. I understand that controlled substances may be just one part 
of my treatment plan. My initial on each page and my signature below shows that I have read each page of this Agreement, I have had an opportunity to ask questions, and all of my questions have been answered to my satisfaction by my provider.  
 
By signing below, I agree to comply with this Agreement, and I understand that if I do not follow the Agreements listed above, my provider may stop 
 
_________________________________________  Date/Time 6/11/2018 10:26 AM   
             (Patient Signature) 
 
________________________________________    Date/Time 6/11/2018 10:26 AM 
 (Witness) 
 
______________________________20 yrs___________ Date/Time 6/11/2018 10:26 AM 
 (Provider Signature) Pharmacy _____________________________ Phone Number_____________

## 2018-06-14 LAB — DRUGS UR: NORMAL

## 2018-07-23 DIAGNOSIS — M19.049 HAND ARTHRITIS: ICD-10-CM

## 2018-07-24 ENCOUNTER — TELEPHONE (OUTPATIENT)
Dept: INTERNAL MEDICINE CLINIC | Age: 73
End: 2018-07-24

## 2018-07-24 RX ORDER — TRAMADOL HYDROCHLORIDE 50 MG/1
TABLET ORAL
Qty: 120 TAB | Refills: 1 | Status: SHIPPED | OUTPATIENT
Start: 2018-07-24 | End: 2018-11-12 | Stop reason: SDUPTHER

## 2018-07-24 NOTE — TELEPHONE ENCOUNTER
7/24/18 Patient was notified of prescription refill Tramadol. Patient was instructed to bring proper ID to show the PSR's at the .  is the only one who could  her prescription.

## 2018-09-24 ENCOUNTER — TELEPHONE (OUTPATIENT)
Dept: CARDIOLOGY CLINIC | Age: 73
End: 2018-09-24

## 2018-09-24 NOTE — TELEPHONE ENCOUNTER
Dental office needs to know if patient needs medicine prior to dental procedure.  Please call Thanks

## 2018-09-24 NOTE — TELEPHONE ENCOUNTER
Spoke with Sandra Amaya at dental office, pt is scheduled to have a filling and teeth cleaned, asking if pt needs an abx, will discuss with Dr. Johann Welsh can call back patient.

## 2018-09-25 NOTE — TELEPHONE ENCOUNTER
Verified patient with two identifiers. Spoke with Rodríguez Culver @ dental office advising her per Dr. Dipti Hernandez pt does not have to have an abx before dental cleanings.

## 2018-10-26 RX ORDER — OXYBUTYNIN CHLORIDE 15 MG/1
TABLET, EXTENDED RELEASE ORAL
Qty: 90 TAB | Refills: 3 | Status: SHIPPED | OUTPATIENT
Start: 2018-10-26 | End: 2019-10-17 | Stop reason: SDUPTHER

## 2018-11-09 RX ORDER — LEVOTHYROXINE SODIUM 50 UG/1
50 TABLET ORAL
Qty: 90 TAB | Refills: 3 | Status: SHIPPED | OUTPATIENT
Start: 2018-11-09 | End: 2018-11-12 | Stop reason: SDUPTHER

## 2018-11-11 NOTE — PROGRESS NOTES
HISTORY OF PRESENT ILLNESS Julieth Whittington is a 72 y.o. female. HPI  
6 month f/u HTN HLD ( statin intolerant)  hypopthyroidism, chronic pain r/t oa of hands , back and feet, obesity (BMI 32) and medicare wellness---- Takes tramadol one tab bid and neurontin every day for kow back pain and sciatica-- reviewed Due for dexa This is a chronic problem that is not changed. Per review of available records and patients , there are not sign of overuse, misuse, diversion, or concerning side effects. Today we reviewed: the risk of overdose, addiction, and dependency proper storage and disposal of medications  The following changes were made to the patients current treatment plan: nothing, medications refilled. Last OV Had NST for cp/palpitations 3 mos ago at [facility]--normal.  
Declined trial of welchol for HLD ( statin intolerant) On tramadol one bid  And neurontin  for low back pain with right sciatica  reviewed on 6-10-18 Needs to sign pain contract------------------------signed today This is a chronic problem that is not changed.  Per review of available records and patients , there are not sign of overuse, misuse, diversion, or concerning side effects. Today we reviewed: the risk of overdose, addiction, and dependency the risks and benefits of continuing with a narcotic based pain regimen  The following changes were made to the patients current treatment plan: nothing, medications refilled.   
  
Some increased right leg edema, has varicose veins Saw allergy MD now on steroid inhaler--breathing better. On Breo ellipta. Has some right chest wall pain Patient Active Problem List  
 Diagnosis Date Noted  Nonrheumatic aortic valve stenosis 06/08/2017  Heart murmur 05/24/2016  Advanced care planning/counseling discussion 05/24/2016  DDD (degenerative disc disease), lumbar 10/04/2013  Obesity 04/05/2013  Essential hypertension, benign 03/31/2010  Pure hypercholesterolemia 03/31/2010  Overweight 03/31/2010  Hypothyroidism 03/31/2010  DJD (degenerative joint disease) 03/31/2010  Overactive bladder 03/31/2010  Diverticulosis 03/31/2010 Current Outpatient Medications Medication Sig Dispense Refill  levothyroxine (SYNTHROID) 50 mcg tablet Take 1 Tab by mouth Daily (before breakfast). 90 Tab 3  
 oxybutynin chloride XL (DITROPAN XL) 15 mg CR tablet TAKE 1 TABLET BY MOUTH EVERY DAY 90 Tab 3  
 traMADol (ULTRAM) 50 mg tablet TAKE ONE TABLET BY MOUTH EVERY 6 HOURS AS NEEDED 120 Tab 1  
 lisinopril-hydroCHLOROthiazide (PRINZIDE, ZESTORETIC) 20-12.5 mg per tablet TAKE 1 TABLET EVERY DAY 90 Tab 3  
 influenza vaccine 2017-18, 65 yrs+,,PF, (FLUZONE HIGH-DOSE 2017-18, PF,) syrg injection TO BE ADMINISTERED BY PHARMACIST FOR IMMUNIZATION    
 levothyroxine (SYNTHROID) 50 mcg tablet TAKE 1 TABLET BY MOUTH EVERY DAY 90 Tab 3  
 gabapentin (NEURONTIN) 100 mg capsule Take  by mouth three (3) times daily.  fluticasone-vilanterol (BREO ELLIPTA) 200-25 mcg/dose inhaler Take 1 Puff by inhalation daily.  ANTIOX #11/OM3/DHA/EPA/LUT/YIN (OCUVITE ADULT 50+ PO) Take  by mouth daily.  Omeprazole delayed release (PRILOSEC D/R) 20 mg tablet Take 20 mg by mouth daily.  ascorbic acid-vitamin E-biotin (HAIR,SKIN & NAILS WITH BIOTIN) 7.5-7.5-1,250 mg-unit-mcg chew Take  by mouth.  OTHER Patient is taking Omega Red daily  ERIC ASPIRIN PO Take 325 mg by mouth daily. Extra strengh  OXYBUTYNIN CHLORIDE (DITROPAN PO) Take 15 mg by mouth daily.  FOLIC ACID/MULTIVITS-MIN (ONE DAILY GUMMY VITES PO) Take 2 Tabs by mouth two (2) times a day.  b complex vitamins tablet Take 1 Tab by mouth daily.  IBUPROFEN (ADVIL LIQUI-GEL PO) Take 400 mg by mouth three (3) times daily. 2 tablets 3 times daily  ethinyl estradiol-norethindron (JINTELI) 1-5 mg-mcg Tab Take 1-5 mcg by mouth every other day.  vitamin c-vitamin e (CRANBERRY CONCENTRATE) Cap Take 500 mg by mouth daily.  fluticasone (FLONASE) 50 mcg/Actuation nasal spray 2 Sprays by Nasal route nightly. EVERY OTHER NIGHT  fexofenadine (ALLEGRA) 180 mg tablet Take  by mouth every thirty (30) days.  vitamin e (E GEMS) 1,000 unit capsule Take 1,000 Units by mouth daily.  GINKGO BILOBA (GINKOBA PO) Take 120 mg by mouth two (2) times a day.  POTASSIUM (POTASSIMIN PO) Take 550 mg by mouth daily. 1/2 tab Allergies Allergen Reactions  Statins-Hmg-Coa Reductase Inhibitors Other (comments) Severe pain and weakness  Amoxicillin Hives  Colestid [Colestipol] Myalgia  Pcn [Penicillins] Hives  Pravastatin Myalgia Severe pain and weakness with all statins Lab Results Component Value Date/Time WBC 8.2 10/19/2017 01:42 PM  
 HGB 15.3 10/19/2017 01:42 PM  
 HCT 43.4 10/19/2017 01:42 PM  
 PLATELET 986 78/02/9328 01:42 PM  
 MCV 88 10/19/2017 01:42 PM  
 
Lab Results Component Value Date/Time Glucose 98 10/19/2017 01:42 PM  
 LDL, calculated 178 (H) 10/19/2017 01:42 PM  
 Creatinine 0.94 10/19/2017 01:42 PM  
  
Lab Results Component Value Date/Time Cholesterol, total 258 (H) 10/19/2017 01:42 PM  
 Cholesterol (POC) 204 04/03/2012 10:59 AM  
 HDL Cholesterol 56 10/19/2017 01:42 PM  
 LDL, calculated 178 (H) 10/19/2017 01:42 PM  
 LDL Cholesterol (POC) 131 04/03/2012 10:59 AM  
 Triglyceride 121 10/19/2017 01:42 PM  
 Triglycerides (POC) 142 04/03/2012 10:59 AM  
 
Lab Results Component Value Date/Time GFR est non-AA 62 10/19/2017 01:42 PM  
 GFR est AA 71 10/19/2017 01:42 PM  
 Creatinine 0.94 10/19/2017 01:42 PM  
 BUN 19 10/19/2017 01:42 PM  
 Sodium 139 10/19/2017 01:42 PM  
 Potassium 4.7 10/19/2017 01:42 PM  
 Chloride 98 10/19/2017 01:42 PM  
 CO2 24 10/19/2017 01:42 PM  
  
ROS Physical Exam  
Constitutional: She appears well-developed and well-nourished. Appears stated age Cardiovascular: Normal rate, regular rhythm and normal heart sounds. Exam reveals no gallop and no friction rub. No murmur heard. Pulmonary/Chest: Effort normal and breath sounds normal. No respiratory distress. She has no wheezes. Abdominal: Soft. Bowel sounds are normal.  
Musculoskeletal: She exhibits no edema. Neurological: She is alert. Psychiatric: She has a normal mood and affect. Nursing note and vitals reviewed. ASSESSMENT and PLAN Diagnoses and all orders for this visit: 1. Essential hypertension 
-     CBC W/O DIFF 
-     METABOLIC PANEL, COMPREHENSIVE 2. Pure hypercholesterolemia -     METABOLIC PANEL, COMPREHENSIVE 
-     LIPID PANEL 3. Hypothyroidism, unspecified type 
-     TSH 3RD GENERATION 4. Hand arthritis -     CBC W/O DIFF 
 
5. Obesity (BMI 30.0-34.9) 6. Menopause -     DEXA BONE DENSITY STUDY AXIAL; Future 7. Varicose veins of right lower extremity, unspecified whether complicated Follow-up Disposition: Not on File This is the Subsequent Medicare Annual Wellness Exam, performed 12 months or more after the Initial AWV or the last Subsequent AWV I have reviewed the patient's medical history in detail and updated the computerized patient record. History Past Medical History:  
Diagnosis Date  Arthritis   
 back,feet  Chronic pain   
 back,feet  GERD (gastroesophageal reflux disease)  Hypertension  Obesity  Other ill-defined conditions(799.89)   
 heart murmur  Stroke [facility])   
 mini  Thyroid disease   
 hypo  Unspecified adverse effect of anesthesia   
 slow to wake up Past Surgical History:  
Procedure Laterality Date  HX ORTHOPAEDIC    
 left ankle surgery due to fx  HX TONSILLECTOMY  HX UROLOGICAL  2013  
 bladder biopsy  ND COLONOSCOPY W/BIOPSY SINGLE/MULTIPLE  2/28/2014  ND EGD BALLOON DILATION ESOPHAGUS <30 MM DIAM  11/5/2012  MO EGD TRANSORAL BIOPSY SINGLE/MULTIPLE  11/5/2012 Current Outpatient Medications Medication Sig Dispense Refill  [id number]-[id number]-NOP-HFZF-HOR PO Take  by mouth.  oxybutynin chloride XL (DITROPAN XL) 15 mg CR tablet TAKE 1 TABLET BY MOUTH EVERY DAY 90 Tab 3  
 traMADol (ULTRAM) 50 mg tablet TAKE ONE TABLET BY MOUTH EVERY 6 HOURS AS NEEDED 120 Tab 1  
 lisinopril-hydroCHLOROthiazide (PRINZIDE, ZESTORETIC) 20-12.5 mg per tablet TAKE 1 TABLET EVERY DAY 90 Tab 3  
 levothyroxine (SYNTHROID) 50 mcg tablet TAKE 1 TABLET BY MOUTH EVERY DAY 90 Tab 3  
 gabapentin (NEURONTIN) 100 mg capsule Take  by mouth three (3) times daily.  ANTIOX #11/OM3/DHA/EPA/LUT/YIN (OCUVITE ADULT 50+ PO) Take  by mouth daily.  Omeprazole delayed release (PRILOSEC D/R) 20 mg tablet Take 20 mg by mouth daily.  ascorbic acid-vitamin E-biotin (HAIR,SKIN & NAILS WITH BIOTIN) 7.5-7.5-1,250 mg-unit-mcg chew Take  by mouth.  OTHER Patient is taking Omega Red daily  ERIC ASPIRIN PO Take 325 mg by mouth daily. Extra strengh  OXYBUTYNIN CHLORIDE (DITROPAN PO) Take 15 mg by mouth daily.  FOLIC ACID/MULTIVITS-MIN (ONE DAILY GUMMY VITES PO) Take 2 Tabs by mouth two (2) times a day.  b complex vitamins tablet Take 1 Tab by mouth daily.  IBUPROFEN (ADVIL LIQUI-GEL PO) Take 400 mg by mouth three (3) times daily. 2 tablets 3 times daily  ethinyl estradiol-norethindron (JINTELI) 1-5 mg-mcg Tab Take 1-5 mcg by mouth every other day.  vitamin c-vitamin e (CRANBERRY CONCENTRATE) Cap Take 500 mg by mouth daily.  fluticasone (FLONASE) 50 mcg/Actuation nasal spray 2 Sprays by Nasal route nightly. EVERY OTHER NIGHT  fexofenadine (ALLEGRA) 180 mg tablet Take  by mouth every thirty (30) days.  vitamin e (E GEMS) 1,000 unit capsule Take 1,000 Units by mouth daily.  POTASSIUM (POTASSIMIN PO) Take 550 mg by mouth daily. 1/2 tab  influenza vaccine 2017-18, 65 yrs+,,PF, (FLUZONE HIGH-DOSE 2017-18, PF,) syrg injection TO BE ADMINISTERED BY PHARMACIST FOR IMMUNIZATION  fluticasone-vilanterol (BREO ELLIPTA) 200-25 mcg/dose inhaler Take 1 Puff by inhalation daily.  GINKGO BILOBA (GINKOBA PO) Take 120 mg by mouth two (2) times a day. Allergies Allergen Reactions  Statins-Hmg-Coa Reductase Inhibitors Other (comments) Severe pain and weakness  Amoxicillin Hives  Colestid [Colestipol] Myalgia  Pcn [Penicillins] Hives  Pravastatin Myalgia Severe pain and weakness with all statins Family History Problem Relation Age of Onset  Alcohol abuse Mother Jim Moe Arthritis-osteo Mother  Cancer Father   
     testicular Social History Tobacco Use  Smoking status: Never Smoker  Smokeless tobacco: Never Used Substance Use Topics  Alcohol use: No  
 
Patient Active Problem List  
Diagnosis Code  Essential hypertension, benign I10  Pure hypercholesterolemia E78.00  
 Overweight E66.3  Hypothyroidism E03.9  DJD (degenerative joint disease) M19.90  
 Overactive bladder N32.81  
 Diverticulosis K57.90  Obesity E66.9  DDD (degenerative disc disease), lumbar M51.36  
 Heart murmur R01.1  Advanced care planning/counseling discussion Z71.89  
 Nonrheumatic aortic valve stenosis I35.0 Depression Risk Factor Screening: PHQ over the last two weeks 11/12/2018 Little interest or pleasure in doing things Not at all Feeling down, depressed, irritable, or hopeless Not at all Total Score PHQ 2 0 Alcohol Risk Factor Screening: You do not drink alcohol or very rarely. Functional Ability and Level of Safety:  
Hearing Loss Hearing is good. Activities of Daily Living The home contains: no safety equipment. Patient does total self care Fall Risk Fall Risk Assessment, last 12 mths 11/12/2018 Able to walk? Yes Fall in past 12 months?  No  
 
 
 Abuse Screen Patient is not abused Cognitive Screening Evaluation of Cognitive Function: 
Has your family/caregiver stated any concerns about your memory: no 
Normal 
 
Patient Care Team  
Patient Care Team: 
Tina Moreno MD as PCP - Angel Escalante, CITLALY as Ambulatory Care Navigator Donna Escobar MD (Gastroenterology) Emmett Garzon MD (Ophthalmology) Carl Lamb MD (Optometry) Anu Tillman NP (Obstetrics & Gynecology) Terence Sanchez MD as Physician (Cardiology) Assessment/Plan Education and counseling provided: 
Are appropriate based on today's review and evaluation End-of-Life planning (with patient's consent)-see acp note Bone mass measurement (DEXA)-ordered 
shingrix recommended Diagnoses and all orders for this visit: 1. Essential hypertension 
-     CBC W/O DIFF 
-     METABOLIC PANEL, COMPREHENSIVE 
 controlled 2. Pure hypercholesterolemia -     METABOLIC PANEL, COMPREHENSIVE 
-     LIPID PANEL Statin intolerant 3. Hypothyroidism, unspecified type 
-     TSH 3RD GENERATION 4. Hand arthritis -     CBC W/O DIFF 
 
5. Obesity (BMI 30.0-34.9) I have reviewed/discussed the above normal BMI with the patient. I have recommended the following interventions: dietary management education, guidance, and counseling . Augusta Remy 6. Menopause -     DEXA BONE DENSITY STUDY AXIAL; Future 7. Varicose veins of right lower extremity, unspecified whether complicated Refer cardiologist 
 
 
Health Maintenance Due Topic Date Due  Shingrix Vaccine Age 50> (1 of 2) 08/13/1997  Bone Densitometry  10/20/2013  GLAUCOMA SCREENING Q2Y  09/15/2016  MEDICARE YEARLY EXAM  03/14/2018  Influenza Age 5 to Adult  08/01/2018  BREAST CANCER SCRN MAMMOGRAM  11/27/2018

## 2018-11-12 ENCOUNTER — HOSPITAL ENCOUNTER (OUTPATIENT)
Dept: LAB | Age: 73
Discharge: HOME | End: 2018-11-12
Payer: MEDICARE

## 2018-11-12 ENCOUNTER — OFFICE VISIT (OUTPATIENT)
Dept: INTERNAL MEDICINE CLINIC | Age: 73
End: 2018-11-12

## 2018-11-12 VITALS
DIASTOLIC BLOOD PRESSURE: 71 MMHG | TEMPERATURE: 98 F | OXYGEN SATURATION: 96 % | SYSTOLIC BLOOD PRESSURE: 132 MMHG | BODY MASS INDEX: 32.96 KG/M2 | WEIGHT: 186 LBS | HEART RATE: 63 BPM | HEIGHT: 63 IN

## 2018-11-12 DIAGNOSIS — E03.9 HYPOTHYROIDISM, UNSPECIFIED TYPE: ICD-10-CM

## 2018-11-12 DIAGNOSIS — M19.049 HAND ARTHRITIS: ICD-10-CM

## 2018-11-12 DIAGNOSIS — Z79.891 ENCOUNTER FOR LONG-TERM OPIATE ANALGESIC USE: ICD-10-CM

## 2018-11-12 DIAGNOSIS — I10 ESSENTIAL HYPERTENSION: Primary | ICD-10-CM

## 2018-11-12 DIAGNOSIS — E78.00 PURE HYPERCHOLESTEROLEMIA: ICD-10-CM

## 2018-11-12 DIAGNOSIS — E66.9 OBESITY (BMI 30.0-34.9): ICD-10-CM

## 2018-11-12 DIAGNOSIS — Z00.00 MEDICARE ANNUAL WELLNESS VISIT, SUBSEQUENT: ICD-10-CM

## 2018-11-12 DIAGNOSIS — Z78.0 MENOPAUSE: ICD-10-CM

## 2018-11-12 DIAGNOSIS — I83.91 VARICOSE VEINS OF RIGHT LOWER EXTREMITY, UNSPECIFIED WHETHER COMPLICATED: ICD-10-CM

## 2018-11-12 PROCEDURE — 82570 ASSAY OF URINE CREATININE: CPT

## 2018-11-12 PROCEDURE — 84443 ASSAY THYROID STIM HORMONE: CPT

## 2018-11-12 PROCEDURE — 80061 LIPID PANEL: CPT

## 2018-11-12 PROCEDURE — 85027 COMPLETE CBC AUTOMATED: CPT

## 2018-11-12 PROCEDURE — 36415 COLL VENOUS BLD VENIPUNCTURE: CPT

## 2018-11-12 PROCEDURE — 80053 COMPREHEN METABOLIC PANEL: CPT

## 2018-11-12 RX ORDER — LEVOTHYROXINE SODIUM 50 UG/1
TABLET ORAL
Qty: 90 TAB | Refills: 3 | Status: SHIPPED | OUTPATIENT
Start: 2018-11-12 | End: 2019-08-22

## 2018-11-12 RX ORDER — TRAMADOL HYDROCHLORIDE 50 MG/1
50 TABLET ORAL 2 TIMES DAILY
Qty: 180 TAB | Refills: 0 | Status: SHIPPED | OUTPATIENT
Start: 2018-11-12 | End: 2018-11-13 | Stop reason: SDUPTHER

## 2018-11-12 NOTE — PROGRESS NOTES
Chief Complaint Patient presents with  Thyroid Problem 5 month follow up  Labs 5 month follow up  Medication Refill Tramadol. synthyroid

## 2018-11-12 NOTE — PATIENT INSTRUCTIONS
Medicare Wellness Visit, Female The best way to live healthy is to have a lifestyle where you eat a well-balanced diet, exercise regularly, limit alcohol use, and quit all forms of tobacco/nicotine, if applicable. Regular preventive services are another way to keep healthy. Preventive services (vaccines, screening tests, monitoring & exams) can help personalize your care plan, which helps you manage your own care. Screening tests can find health problems at the earliest stages, when they are easiest to treat. [facility] follows the current, evidence-based guidelines published by the [facility] Hubert Kartik ([facility]) when recommending preventive services for our patients. Because we follow these guidelines, sometimes recommendations change over time as research supports it. (For example, mammograms used to be recommended annually. Even though Medicare will still pay for an annual mammogram, the newer guidelines recommend a mammogram every two years for women of average risk.) Of course, you and your doctor may decide to screen more often for some diseases, based on your risk and your health status. Preventive services for you include: - Medicare offers their members a free annual wellness visit, which is time for you and your primary care provider to discuss and plan for your preventive service needs. Take advantage of this benefit every year! 
-All adults over the age of 72 should receive the recommended pneumonia vaccines. Current USPSTF guidelines recommend a series of two vaccines for the best pneumonia protection.  
-All adults should have a flu vaccine yearly and a tetanus vaccine every 10 years. All adults age 61 and older should receive a shingles vaccine once in their lifetime.   
-A bone mass density test is recommended when a woman turns 65 to screen for osteoporosis. This test is only recommended one time, as a screening. Some providers will use this same test as a disease monitoring tool if you already have osteoporosis. -All adults age 38-68 who are overweight should have a diabetes screening test once every three years.  
-Other screening tests and preventive services for persons with diabetes include: an eye exam to screen for diabetic retinopathy, a kidney function test, a foot exam, and stricter control over your cholesterol.  
-Cardiovascular screening for adults with routine risk involves an electrocardiogram (ECG) at intervals determined by your doctor.  
-Colorectal cancer screenings should be done for adults age 54-65 with no increased risk factors for colorectal cancer. There are a number of acceptable methods of screening for this type of cancer. Each test has its own benefits and drawbacks. Discuss with your doctor what is most appropriate for you during your annual wellness visit. The different tests include: colonoscopy (considered the best screening method), a fecal occult blood test, a fecal DNA test, and sigmoidoscopy. -Breast cancer screenings are recommended every other year for women of normal risk, age 54-69. 
-Cervical cancer screenings for women over age 72 are only recommended with certain risk factors.  
-All adults born between [facility] should be screened once for Hepatitis C. Here is a list of your current Health Maintenance items (your personalized list of preventive services) with a due date: 
Health Maintenance Due Topic Date Due  Shingles Vaccine (1 of 2) 08/13/1997  Bone Densitometry  10/20/2013  Glaucoma Screening   09/15/2016 [email or web address] Annual Well Visit  03/14/2018  Flu Vaccine  08/01/2018  Breast Cancer Screening  11/27/2018

## 2018-11-12 NOTE — LETTER
11/17/2018 8:08 PM 
 
Ms. Fredi Brittle [address] [facility] [address] Dear Fredi Brittle: 
 
Please find your most recent results below. Resulted Orders CBC W/O DIFF Result Value Ref Range WBC 7.0 3.4 - 10.8 x10E3/uL  
 RBC 4.62 3.77 - 5.28 x10E6/uL HGB 13.7 11.1 - 15.9 g/dL HCT 40.3 34.0 - 46.6 % MCV 87 79 - 97 fL  
 MCH 29.7 26.6 - 33.0 pg  
 MCHC 34.0 31.5 - 35.7 g/dL  
 RDW 14.0 12.3 - 15.4 % PLATELET 628 254 - 497 x10E3/uL Narrative Performed at:  [address]  [id number] : Keith Vides MD, Phone:  [phone] METABOLIC PANEL, COMPREHENSIVE Result Value Ref Range Glucose 99 65 - 99 mg/dL BUN 23 8 - 27 mg/dL Creatinine 0.92 0.57 - 1.00 mg/dL GFR est non-AA 63 >59 mL/min/1.73 GFR est AA 72 >59 mL/min/1.73  
 BUN/Creatinine ratio 25 12 - 28 Sodium 146 (H) 134 - 144 mmol/L Potassium 4.3 3.5 - 5.2 mmol/L Chloride 105 96 - 106 mmol/L  
 CO2 17 (L) 20 - 29 mmol/L Calcium 9.2 8.7 - 10.3 mg/dL Protein, total 7.0 6.0 - 8.5 g/dL Albumin 4.4 3.5 - 4.8 g/dL GLOBULIN, TOTAL 2.6 1.5 - 4.5 g/dL A-G Ratio 1.7 1.2 - 2.2 Bilirubin, total 0.4 0.0 - 1.2 mg/dL Alk. phosphatase 100 39 - 117 IU/L  
 AST (SGOT) 28 0 - 40 IU/L  
 ALT (SGPT) 23 0 - 32 IU/L Narrative Performed at:  [address]  [id number] : Keith Vides MD, Phone:  [phone] LIPID PANEL Result Value Ref Range Cholesterol, total 262 (H) 100 - 199 mg/dL Triglyceride 98 0 - 149 mg/dL HDL Cholesterol 60 >39 mg/dL VLDL, calculated 20 5 - 40 mg/dL LDL, calculated 182 (H) 0 - 99 mg/dL Narrative Performed at:  [address]  [id number] : Keith Vides MD, Phone:  [phone] TSH 3RD GENERATION Result Value Ref Range TSH 0.714 0.450 - 4.500 uIU/mL Narrative Performed at:  [address], [place], [place]  [id number] : Peyton Lauriemahin OLGUIN, Phone:  [phone] TOXASSURE SELECT 13 (MW)  
Result Value Ref Range Summary FINAL Comment:  
   ==================================================================== 
TOXASSURE SELECT 13 (MW) 
==================================================================== Test                             Result       Flag       Units Drug Present Tramadol                       4140                    ng/mg creat O-Desmethyltramadol            5867                    ng/mg creat N-Desmethyltramadol            760                     ng/mg creat Source of tramadol is a prescription medication. O-desmethyltramadol and N-desmethyltramadol are expected 
   metabolites of tramadol. 
==================================================================== Test                      Result    Flag   Units      Ref Range Creatinine              60               mg/dL      >=20 
==================================================================== Declared Medications: 
 Medication list was not provided. 
==================================================================== For clini 
ammon consultation, please call [phone]. 
==================================================================== Narrative Performed at:  [facility], [address] [place], [address] : Osman Sepulveda, Phone:  [phone] RECOMMENDATIONS: 
None. Keep up the good work! Work on diet and exercise. Continue with current  medications. Normal blood cell counts, glucose,kidney,liver, electrolytes and thyroid levels. Your cholesterol levels are very elevated--work on low low cholesterol diet and regular exercise to lower the levels. Please call me if you have any questions: [phone] Sincerely, 
 
 
Davis Nicholson MD

## 2018-11-13 DIAGNOSIS — M19.049 HAND ARTHRITIS: ICD-10-CM

## 2018-11-13 RX ORDER — TRAMADOL HYDROCHLORIDE 50 MG/1
TABLET ORAL
Qty: 120 TAB | Refills: 1 | Status: SHIPPED | OUTPATIENT
Start: 2018-11-13 | End: 2019-06-18 | Stop reason: SDUPTHER

## 2018-11-16 LAB
ALBUMIN SERPL-MCNC: 4.4 G/DL (ref 3.5–4.8)
ALBUMIN/GLOB SERPL: 1.7 {RATIO} (ref 1.2–2.2)
ALP SERPL-CCNC: 100 IU/L (ref 39–117)
ALT SERPL-CCNC: 23 IU/L (ref 0–32)
AST SERPL-CCNC: 28 IU/L (ref 0–40)
BILIRUB SERPL-MCNC: 0.4 MG/DL (ref 0–1.2)
BUN SERPL-MCNC: 23 MG/DL (ref 8–27)
BUN/CREAT SERPL: 25 (ref 12–28)
CALCIUM SERPL-MCNC: 9.2 MG/DL (ref 8.7–10.3)
CHLORIDE SERPL-SCNC: 105 MMOL/L (ref 96–106)
CHOLEST SERPL-MCNC: 262 MG/DL (ref 100–199)
CO2 SERPL-SCNC: 17 MMOL/L (ref 20–29)
CREAT SERPL-MCNC: 0.92 MG/DL (ref 0.57–1)
DRUGS UR: NORMAL
ERYTHROCYTE [DISTWIDTH] IN BLOOD BY AUTOMATED COUNT: 14 % (ref 12.3–15.4)
GLOBULIN SER CALC-MCNC: 2.6 G/DL (ref 1.5–4.5)
GLUCOSE SERPL-MCNC: 99 MG/DL (ref 65–99)
HCT VFR BLD AUTO: 40.3 % (ref 34–46.6)
HDLC SERPL-MCNC: 60 MG/DL
HGB BLD-MCNC: 13.7 G/DL (ref 11.1–15.9)
LDLC SERPL CALC-MCNC: 182 MG/DL (ref 0–99)
MCH RBC QN AUTO: 29.7 PG (ref 26.6–33)
MCHC RBC AUTO-ENTMCNC: 34 G/DL (ref 31.5–35.7)
MCV RBC AUTO: 87 FL (ref 79–97)
PLATELET # BLD AUTO: 251 X10E3/UL (ref 150–379)
POTASSIUM SERPL-SCNC: 4.3 MMOL/L (ref 3.5–5.2)
PROT SERPL-MCNC: 7 G/DL (ref 6–8.5)
RBC # BLD AUTO: 4.62 X10E6/UL (ref 3.77–5.28)
SODIUM SERPL-SCNC: 146 MMOL/L (ref 134–144)
TRIGL SERPL-MCNC: 98 MG/DL (ref 0–149)
TSH SERPL DL<=0.005 MIU/L-ACNC: 0.71 UIU/ML (ref 0.45–4.5)
VLDLC SERPL CALC-MCNC: 20 MG/DL (ref 5–40)
WBC # BLD AUTO: 7 X10E3/UL (ref 3.4–10.8)

## 2018-11-20 RX ORDER — LISINOPRIL AND HYDROCHLOROTHIAZIDE 12.5; 2 MG/1; MG/1
TABLET ORAL
Qty: 90 TAB | Refills: 3 | Status: SHIPPED | OUTPATIENT
Start: 2018-11-20 | End: 2020-02-05

## 2018-11-20 NOTE — TELEPHONE ENCOUNTER
PCP: Niurka Catherine MD    Last appt: 11/12/2018  No future appointments.     Requested Prescriptions     Pending Prescriptions Disp Refills    lisinopril-hydroCHLOROthiazide (PRINZIDE, ZESTORETIC) 20-12.5 mg per tablet 90 Tab 3     Sig: TAKE 1 TABLET EVERY DAY

## 2019-01-23 RX ORDER — GABAPENTIN 100 MG/1
CAPSULE ORAL
Qty: 90 CAP | Refills: 2 | Status: SHIPPED | OUTPATIENT
Start: 2019-01-23 | End: 2019-02-18

## 2019-02-18 DIAGNOSIS — M79.2 NEUROPATHIC PAIN: Primary | ICD-10-CM

## 2019-02-18 RX ORDER — GABAPENTIN 100 MG/1
100 CAPSULE ORAL 3 TIMES DAILY
Qty: 90 CAP | Refills: 3 | Status: SHIPPED | OUTPATIENT
Start: 2019-02-18 | End: 2019-03-14 | Stop reason: SDUPTHER

## 2019-03-14 DIAGNOSIS — M79.2 NEUROPATHIC PAIN: ICD-10-CM

## 2019-03-14 RX ORDER — GABAPENTIN 100 MG/1
100 CAPSULE ORAL 3 TIMES DAILY
Qty: 270 CAP | Refills: 3 | Status: SHIPPED | OUTPATIENT
Start: 2019-03-14 | End: 2019-10-25 | Stop reason: SDUPTHER

## 2019-06-18 DIAGNOSIS — M19.049 HAND ARTHRITIS: ICD-10-CM

## 2019-06-18 NOTE — TELEPHONE ENCOUNTER
Pt is requesting a refill on \"tramadol 50 mg\" and also pt states that she has been holding water and would like for a \"diuretic\" medication to be prescribed. Send to [facility] pharmacy on file. Best contact number [phone].        Message received & copied from [facility]

## 2019-06-19 RX ORDER — FUROSEMIDE 20 MG/1
20 TABLET ORAL
Qty: 30 TAB | Refills: 0 | Status: SHIPPED | OUTPATIENT
Start: 2019-06-19 | End: 2019-07-12 | Stop reason: SDUPTHER

## 2019-06-19 RX ORDER — TRAMADOL HYDROCHLORIDE 50 MG/1
50 TABLET ORAL
Qty: 120 TAB | Refills: 1 | Status: SHIPPED | OUTPATIENT
Start: 2019-06-19 | End: 2019-07-19

## 2019-07-12 RX ORDER — FUROSEMIDE 20 MG/1
TABLET ORAL
Qty: 30 TAB | Refills: 0 | Status: SHIPPED | OUTPATIENT
Start: 2019-07-12 | End: 2019-08-06 | Stop reason: SDUPTHER

## 2019-08-06 RX ORDER — FUROSEMIDE 20 MG/1
TABLET ORAL
Qty: 30 TAB | Refills: 0 | Status: SHIPPED | OUTPATIENT
Start: 2019-08-06 | End: 2020-10-02

## 2019-08-21 NOTE — PROGRESS NOTES
HISTORY OF PRESENT ILLNESS  Yury Vallejo is a 69 y.o. female. HPI     6 month f/u HTN HLD ( statin intolerant)  hypopthyroidism, chronic pain r/t oa of hands , back and feet, obesity (BMI 32)   Takes tamadol tid , back pain with sciatica. Increased from bid d/t tid--walking was dfficult taking bid tramadol  This is a chronic problem that is not changed. Per review of available records and patients , there are not sign of overuse, misuse, diversion, or concerning side effects. Today we reviewed: the risk of overdose, addiction, and dependency  The following changes were made to the patients current treatment plan: nothing, medications refilled.       dexa was ordered but not done yet    Has wrist bp cuff--bp 77/41 when felt faint     feels weak in muscles vbut better now after she increased levothyroxine to 1.5 tabs every day ( 75mcg every day)**  Takes lasix a few days per week for edema which helped    Last OV  Takes tramadol one tab bid and neurontin every day for kow back pain and sciatica-- reviewed  Due for dexa     This is a chronic problem that is not changed.  Per review of available records and patients , there are not sign of overuse, misuse, diversion, or concerning side effects. Today we reviewed: the risk of overdose, addiction, and dependency proper storage and disposal of medications  The following changes were made to the patients current treatment plan: nothing, medications refilled.          Last OV  Had NST for cp/palpitations 3 mos ago at [facility]--normal.   Declined trial of welchol for HLD ( statin intolerant)  On tramadol one bid  And neurontin  for low back pain with right sciatica   reviewed on 6-10-18  Needs to sign pain contract------------------------signed today  This is a chronic problem that is not changed.  Per review of available records and patients , there are not sign of overuse, misuse, diversion, or concerning side effects. Today we reviewed: the risk of overdose, addiction, and dependency the risks and benefits of continuing with a narcotic based pain regimen  The following changes were made to the patients current treatment plan: nothing, medications refilled.       Some increased right leg edema, has varicose veins  Saw allergy MD now on steroid inhaler--breathing better. On Breo ellipta. Has some right chest wall pain        Patient Active Problem List    Diagnosis Date Noted    Nonrheumatic aortic valve stenosis 06/08/2017    Heart murmur 05/24/2016    Advanced care planning/counseling discussion 05/24/2016    DDD (degenerative disc disease), lumbar 10/04/2013    Obesity 04/05/2013    Essential hypertension, benign 03/31/2010    Pure hypercholesterolemia 03/31/2010    Overweight 03/31/2010    Hypothyroidism 03/31/2010    DJD (degenerative joint disease) 03/31/2010    Overactive bladder 03/31/2010    Diverticulosis 03/31/2010     Current Outpatient Medications   Medication Sig Dispense Refill    furosemide (LASIX) 20 mg tablet TAKE 1 TAB BY MOUTH DAILY AS NEEDED 30 Tab 0    gabapentin (NEURONTIN) 100 mg capsule Take 1 Cap by mouth three (3) times daily. 270 Cap 3    lisinopril-hydroCHLOROthiazide (PRINZIDE, ZESTORETIC) 20-12.5 mg per tablet TAKE 1 TABLET EVERY DAY 90 Tab 3    QQYB-JKYO-RYT-VIZ-JMM-RHYS-HOR PO Take  by mouth.  levothyroxine (SYNTHROID) 50 mcg tablet TAKE 1 TABLET BY MOUTH EVERY DAY 90 Tab 3    oxybutynin chloride XL (DITROPAN XL) 15 mg CR tablet TAKE 1 TABLET BY MOUTH EVERY DAY 90 Tab 3    influenza vaccine 2017-18, 65 yrs+,,PF, (FLUZONE HIGH-DOSE 2017-18, PF,) syrg injection TO BE ADMINISTERED BY PHARMACIST FOR IMMUNIZATION      fluticasone-vilanterol (BREO ELLIPTA) 200-25 mcg/dose inhaler Take 1 Puff by inhalation daily.  ANTIOX #11/OM3/DHA/EPA/LUT/YIN (OCUVITE ADULT 50+ PO) Take  by mouth daily.  Omeprazole delayed release (PRILOSEC D/R) 20 mg tablet Take 20 mg by mouth daily.       ascorbic acid-vitamin E-biotin (HAIR,SKIN & NAILS WITH BIOTIN) 7.5-7.5-1,250 mg-unit-mcg chew Take  by mouth.  OTHER Patient is taking Omega Red daily      ERIC ASPIRIN PO Take 325 mg by mouth daily. Extra strengh       OXYBUTYNIN CHLORIDE (DITROPAN PO) Take 15 mg by mouth daily.  FOLIC ACID/MULTIVITS-MIN (ONE DAILY GUMMY VITES PO) Take 2 Tabs by mouth two (2) times a day.  b complex vitamins tablet Take 1 Tab by mouth daily.  IBUPROFEN (ADVIL LIQUI-GEL PO) Take 400 mg by mouth three (3) times daily. 2 tablets 3 times daily      ethinyl estradiol-norethindron (JINTELI) 1-5 mg-mcg Tab Take 1-5 mcg by mouth every other day.  vitamin c-vitamin e (CRANBERRY CONCENTRATE) Cap Take 500 mg by mouth daily.  fluticasone (FLONASE) 50 mcg/Actuation nasal spray 2 Sprays by Nasal route nightly. EVERY OTHER NIGHT      fexofenadine (ALLEGRA) 180 mg tablet Take  by mouth every thirty (30) days.  vitamin e (E GEMS) 1,000 unit capsule Take 1,000 Units by mouth daily.  GINKGO BILOBA (GINKOBA PO) Take 120 mg by mouth two (2) times a day.  POTASSIUM (POTASSIMIN PO) Take 550 mg by mouth daily.  1/2 tab       Allergies   Allergen Reactions    Statins-Hmg-Coa Reductase Inhibitors Other (comments)     Severe pain and weakness    Amoxicillin Hives    Colestid [Colestipol] Myalgia    Pcn [Penicillins] Hives    Pravastatin Myalgia     Severe pain and weakness with all statins      Lab Results   Component Value Date/Time    WBC 7.0 11/12/2018 11:20 AM    HGB 13.7 11/12/2018 11:20 AM    HCT 40.3 11/12/2018 11:20 AM    PLATELET 592 35/66/9949 11:20 AM    MCV 87 11/12/2018 11:20 AM     Lab Results   Component Value Date/Time    Glucose 99 11/12/2018 11:20 AM    LDL, calculated 182 (H) 11/12/2018 11:20 AM    Creatinine 0.92 11/12/2018 11:20 AM      Lab Results   Component Value Date/Time    Cholesterol, total 262 (H) 11/12/2018 11:20 AM    Cholesterol (POC) 204 04/03/2012 10:59 AM    HDL Cholesterol 60 11/12/2018 11:20 AM    LDL, calculated 182 (H) 11/12/2018 11:20 AM    LDL Cholesterol (POC) 131 04/03/2012 10:59 AM    Triglyceride 98 11/12/2018 11:20 AM    Triglycerides (POC) 142 04/03/2012 10:59 AM     Lab Results   Component Value Date/Time    GFR est non-AA 63 11/12/2018 11:20 AM    GFR est AA 72 11/12/2018 11:20 AM    Creatinine 0.92 11/12/2018 11:20 AM    BUN 23 11/12/2018 11:20 AM    Sodium 146 (H) 11/12/2018 11:20 AM    Potassium 4.3 11/12/2018 11:20 AM    Chloride 105 11/12/2018 11:20 AM    CO2 17 (L) 11/12/2018 11:20 AM        ROS    Physical Exam   Constitutional: She appears well-developed and well-nourished. Appears stated age   Cardiovascular: Normal rate, regular rhythm and normal heart sounds. Exam reveals no gallop and no friction rub. No murmur heard. Pulmonary/Chest: Effort normal and breath sounds normal. No respiratory distress. She has no wheezes. Abdominal: Soft. Bowel sounds are normal.   Musculoskeletal: She exhibits no edema. Neurological: She is alert. Psychiatric: She has a normal mood and affect. Nursing note and vitals reviewed. ASSESSMENT and PLAN  Diagnoses and all orders for this visit:    1. Essential hypertension  -     METABOLIC PANEL, BASIC  -     CBC W/O DIFF   controlled  2. Pure hypercholesterolemia  -     LIPID PANEL   Manage with diet and exercise  3. Other chronic pain  -     TOXASSURE SELECT 13 (MW)   Refill tramadol when needed--90/month    was reviewed today   On gabapentin but will try to dashawn only at night d/t daytime fatigue  4. Hypothyroidism, unspecified type  -     TSH 3RD GENERATION   Taking levothyroxine 75 mcg every day now       Follow-up and Dispositions    · Return in about 6 months (around 2/22/2020) for htn hld back pain hypothyroid x30 min.

## 2019-08-22 ENCOUNTER — OFFICE VISIT (OUTPATIENT)
Dept: INTERNAL MEDICINE CLINIC | Age: 74
End: 2019-08-22

## 2019-08-22 ENCOUNTER — HOSPITAL ENCOUNTER (OUTPATIENT)
Dept: LAB | Age: 74
Discharge: HOME | End: 2019-08-22
Payer: MEDICARE

## 2019-08-22 VITALS
RESPIRATION RATE: 16 BRPM | TEMPERATURE: 98.3 F | BODY MASS INDEX: 31.71 KG/M2 | HEIGHT: 63 IN | HEART RATE: 76 BPM | SYSTOLIC BLOOD PRESSURE: 135 MMHG | OXYGEN SATURATION: 98 % | WEIGHT: 179 LBS | DIASTOLIC BLOOD PRESSURE: 66 MMHG

## 2019-08-22 DIAGNOSIS — E78.00 PURE HYPERCHOLESTEROLEMIA: ICD-10-CM

## 2019-08-22 DIAGNOSIS — E03.9 HYPOTHYROIDISM, UNSPECIFIED TYPE: ICD-10-CM

## 2019-08-22 DIAGNOSIS — G89.29 OTHER CHRONIC PAIN: ICD-10-CM

## 2019-08-22 DIAGNOSIS — I10 ESSENTIAL HYPERTENSION: Primary | ICD-10-CM

## 2019-08-22 PROCEDURE — 84443 ASSAY THYROID STIM HORMONE: CPT

## 2019-08-22 PROCEDURE — 36415 COLL VENOUS BLD VENIPUNCTURE: CPT

## 2019-08-22 PROCEDURE — 85027 COMPLETE CBC AUTOMATED: CPT

## 2019-08-22 PROCEDURE — 80048 BASIC METABOLIC PNL TOTAL CA: CPT

## 2019-08-22 PROCEDURE — 80061 LIPID PANEL: CPT

## 2019-08-22 RX ORDER — LEVOTHYROXINE SODIUM 75 UG/1
TABLET ORAL
Qty: 1 TAB | Refills: 0
Start: 2019-08-22 | End: 2019-08-23

## 2019-08-22 NOTE — PROGRESS NOTES
Chief Complaint   Patient presents with    Cholesterol Problem     Routine    Hypertension     Routine    Drug Screen     routine

## 2019-08-22 NOTE — PATIENT INSTRUCTIONS
Office Policies    Phone calls/patient messages:            Please allow up to 24 hours for someone in the office to contact you about your call or message. Be mindful your provider may be out of the office or your message may require further review. We encourage you to use hopscout for your messages as this is a faster, more efficient way to communicate with our office                         Medication Refills:            Prescription medications require 48-72 business hours to process. We encourage you to use hopscout for your refills. For controlled medications: Please allow 72 business hours to process. Certain medications may require you to  a written prescription at our office. NO narcotic/controlled medications will be prescribed after 4pm Monday through Friday or on weekends              Form/Paperwork Completion:            Please note a $25 fee may incur for all paperwork for completed by our providers. We ask that you allow 7-10 business days. Pre-payment is due prior to picking up/faxing the completed form. You may also download your forms to hopscout to have your doctor print off.

## 2019-08-23 LAB
BUN SERPL-MCNC: 25 MG/DL (ref 8–27)
BUN/CREAT SERPL: 21 (ref 12–28)
CALCIUM SERPL-MCNC: 9.4 MG/DL (ref 8.7–10.3)
CHLORIDE SERPL-SCNC: 98 MMOL/L (ref 96–106)
CHOLEST SERPL-MCNC: 222 MG/DL (ref 100–199)
CO2 SERPL-SCNC: 24 MMOL/L (ref 20–29)
CREAT SERPL-MCNC: 1.18 MG/DL (ref 0.57–1)
ERYTHROCYTE [DISTWIDTH] IN BLOOD BY AUTOMATED COUNT: 13.8 % (ref 12.3–15.4)
GLUCOSE SERPL-MCNC: 97 MG/DL (ref 65–99)
HCT VFR BLD AUTO: 39.2 % (ref 34–46.6)
HDLC SERPL-MCNC: 41 MG/DL
HGB BLD-MCNC: 13.2 G/DL (ref 11.1–15.9)
LDLC SERPL CALC-MCNC: 143 MG/DL (ref 0–99)
MCH RBC QN AUTO: 29.6 PG (ref 26.6–33)
MCHC RBC AUTO-ENTMCNC: 33.7 G/DL (ref 31.5–35.7)
MCV RBC AUTO: 88 FL (ref 79–97)
PLATELET # BLD AUTO: 284 X10E3/UL (ref 150–450)
POTASSIUM SERPL-SCNC: 4.7 MMOL/L (ref 3.5–5.2)
RBC # BLD AUTO: 4.46 X10E6/UL (ref 3.77–5.28)
SODIUM SERPL-SCNC: 138 MMOL/L (ref 134–144)
TRIGL SERPL-MCNC: 190 MG/DL (ref 0–149)
TSH SERPL DL<=0.005 MIU/L-ACNC: 0.23 UIU/ML (ref 0.45–4.5)
VLDLC SERPL CALC-MCNC: 38 MG/DL (ref 5–40)
WBC # BLD AUTO: 7.5 X10E3/UL (ref 3.4–10.8)

## 2019-08-23 RX ORDER — LEVOTHYROXINE SODIUM 50 UG/1
TABLET ORAL
Qty: 90 TAB | Refills: 3 | Status: SHIPPED | OUTPATIENT
Start: 2019-08-23 | End: 2020-02-25 | Stop reason: DRUGHIGH

## 2019-08-23 NOTE — PROGRESS NOTES
Tell pt her thyroid level is too high--decrease levothyroxine back to 1 tab every day----50 mcg every day  Normal glucose  Appears slighly dehydrated--incrased oral fluids by 1-2 cups per day  Cholesterol levels are slightly improved--low fat low cholesterol diet and regular exercise is recommended

## 2019-08-23 NOTE — PROGRESS NOTES
Called, spoke to pt. Two identifiers confirmed. Notified pt of lab results/recommendations per Dr. Walker Holt. Pt verbalized understanding of information discussed w/ no further questions at this time.

## 2019-08-26 LAB — DRUGS UR: NORMAL

## 2019-09-16 DIAGNOSIS — R52 PAIN: Primary | ICD-10-CM

## 2019-09-16 RX ORDER — TRAMADOL HYDROCHLORIDE 50 MG/1
TABLET ORAL
Qty: 120 TAB | Refills: 1 | Status: SHIPPED | OUTPATIENT
Start: 2019-09-16 | End: 2019-10-16

## 2019-09-17 ENCOUNTER — TELEPHONE (OUTPATIENT)
Dept: INTERNAL MEDICINE CLINIC | Age: 74
End: 2019-09-17

## 2019-09-18 DIAGNOSIS — R52 PAIN: ICD-10-CM

## 2019-09-18 RX ORDER — TRAMADOL HYDROCHLORIDE 50 MG/1
TABLET ORAL
Qty: 120 TAB | Refills: 1 | Status: CANCELLED | OUTPATIENT
Start: 2019-09-18 | End: 2019-10-18

## 2019-10-17 RX ORDER — OXYBUTYNIN CHLORIDE 15 MG/1
TABLET, EXTENDED RELEASE ORAL
Qty: 90 TAB | Refills: 3 | Status: SHIPPED | OUTPATIENT
Start: 2019-10-17 | End: 2020-09-25

## 2019-10-25 DIAGNOSIS — M79.2 NEUROPATHIC PAIN: ICD-10-CM

## 2019-10-25 RX ORDER — GABAPENTIN 100 MG/1
CAPSULE ORAL
Qty: 270 CAP | Refills: 3 | Status: SHIPPED | OUTPATIENT
Start: 2019-10-25 | End: 2020-02-24

## 2019-11-04 ENCOUNTER — HOSPITAL ENCOUNTER (OUTPATIENT)
Dept: GENERAL RADIOLOGY | Age: 74
Discharge: HOME | End: 2019-11-04
Attending: INTERNAL MEDICINE
Payer: MEDICARE

## 2019-11-04 DIAGNOSIS — R13.10 DYSPHAGIA: ICD-10-CM

## 2019-11-04 DIAGNOSIS — K59.00 CONSTIPATION: ICD-10-CM

## 2019-11-04 PROCEDURE — 74220 X-RAY XM ESOPHAGUS 1CNTRST: CPT

## 2019-12-09 DIAGNOSIS — R52 PAIN: Primary | ICD-10-CM

## 2019-12-09 RX ORDER — TRAMADOL HYDROCHLORIDE 50 MG/1
TABLET ORAL
Qty: 120 TAB | Refills: 1 | Status: SHIPPED | OUTPATIENT
Start: 2019-12-09 | End: 2020-01-08

## 2020-01-23 DIAGNOSIS — M79.2 NEUROPATHIC PAIN: Primary | ICD-10-CM

## 2020-01-23 DIAGNOSIS — G89.29 OTHER CHRONIC PAIN: ICD-10-CM

## 2020-01-23 RX ORDER — TRAMADOL HYDROCHLORIDE 50 MG/1
50 TABLET ORAL
Qty: 120 TAB | Refills: 1 | Status: SHIPPED | OUTPATIENT
Start: 2020-01-23 | End: 2020-02-24

## 2020-01-23 NOTE — TELEPHONE ENCOUNTER
Patient is calling, she needs a refill on her Tramadol. She also wants to know if there is a generic form of Ditropan that she can have because this medication is considered tier 4 with her plan.

## 2020-01-27 ENCOUNTER — ANESTHESIA EVENT (OUTPATIENT)
Dept: ENDOSCOPY | Age: 75
End: 2020-01-27
Payer: MEDICARE

## 2020-01-27 ENCOUNTER — ANESTHESIA (OUTPATIENT)
Dept: ENDOSCOPY | Age: 75
End: 2020-01-27
Payer: MEDICARE

## 2020-01-27 ENCOUNTER — HOSPITAL ENCOUNTER (OUTPATIENT)
Age: 75
Setting detail: OUTPATIENT SURGERY
Discharge: HOME | End: 2020-01-27
Attending: INTERNAL MEDICINE | Admitting: INTERNAL MEDICINE
Payer: MEDICARE

## 2020-01-27 VITALS
BODY MASS INDEX: 29.77 KG/M2 | OXYGEN SATURATION: 100 % | HEART RATE: 83 BPM | HEIGHT: 63 IN | RESPIRATION RATE: 8 BRPM | DIASTOLIC BLOOD PRESSURE: 71 MMHG | TEMPERATURE: 97.5 F | SYSTOLIC BLOOD PRESSURE: 150 MMHG | WEIGHT: 168 LBS

## 2020-01-27 LAB — COLONOSCOPY, EXTERNAL: NORMAL

## 2020-01-27 PROCEDURE — 74011000250 HC RX REV CODE- 250: Performed by: NURSE ANESTHETIST, CERTIFIED REGISTERED

## 2020-01-27 PROCEDURE — 74011250636 HC RX REV CODE- 250/636: Performed by: INTERNAL MEDICINE

## 2020-01-27 PROCEDURE — 77030018712 HC DEV BLLN INFL BSC -B: Performed by: INTERNAL MEDICINE

## 2020-01-27 PROCEDURE — 74011250636 HC RX REV CODE- 250/636: Performed by: NURSE ANESTHETIST, CERTIFIED REGISTERED

## 2020-01-27 PROCEDURE — 76040000007: Performed by: INTERNAL MEDICINE

## 2020-01-27 PROCEDURE — 76060000032 HC ANESTHESIA 0.5 TO 1 HR: Performed by: INTERNAL MEDICINE

## 2020-01-27 PROCEDURE — 77030019988 HC FCPS ENDOSC DISP BSC -B: Performed by: INTERNAL MEDICINE

## 2020-01-27 PROCEDURE — 88305 TISSUE EXAM BY PATHOLOGIST: CPT

## 2020-01-27 RX ORDER — DEXTROMETHORPHAN/PSEUDOEPHED 2.5-7.5/.8
1.2 DROPS ORAL
Status: DISCONTINUED | OUTPATIENT
Start: 2020-01-27 | End: 2020-01-27 | Stop reason: HOSPADM

## 2020-01-27 RX ORDER — SODIUM CHLORIDE 0.9 % (FLUSH) 0.9 %
5-40 SYRINGE (ML) INJECTION EVERY 8 HOURS
Status: DISCONTINUED | OUTPATIENT
Start: 2020-01-27 | End: 2020-01-27 | Stop reason: HOSPADM

## 2020-01-27 RX ORDER — GLYCOPYRROLATE 0.2 MG/ML
INJECTION INTRAMUSCULAR; INTRAVENOUS AS NEEDED
Status: DISCONTINUED | OUTPATIENT
Start: 2020-01-27 | End: 2020-01-27 | Stop reason: HOSPADM

## 2020-01-27 RX ORDER — PROPOFOL 10 MG/ML
INJECTION, EMULSION INTRAVENOUS AS NEEDED
Status: DISCONTINUED | OUTPATIENT
Start: 2020-01-27 | End: 2020-01-27 | Stop reason: HOSPADM

## 2020-01-27 RX ORDER — SODIUM CHLORIDE 0.9 % (FLUSH) 0.9 %
5-40 SYRINGE (ML) INJECTION AS NEEDED
Status: DISCONTINUED | OUTPATIENT
Start: 2020-01-27 | End: 2020-01-27 | Stop reason: HOSPADM

## 2020-01-27 RX ORDER — FLUMAZENIL 0.1 MG/ML
0.2 INJECTION INTRAVENOUS
Status: DISCONTINUED | OUTPATIENT
Start: 2020-01-27 | End: 2020-01-27 | Stop reason: HOSPADM

## 2020-01-27 RX ORDER — LIDOCAINE HYDROCHLORIDE 20 MG/ML
INJECTION, SOLUTION EPIDURAL; INFILTRATION; INTRACAUDAL; PERINEURAL AS NEEDED
Status: DISCONTINUED | OUTPATIENT
Start: 2020-01-27 | End: 2020-01-27 | Stop reason: HOSPADM

## 2020-01-27 RX ORDER — NALOXONE HYDROCHLORIDE 0.4 MG/ML
0.4 INJECTION, SOLUTION INTRAMUSCULAR; INTRAVENOUS; SUBCUTANEOUS
Status: DISCONTINUED | OUTPATIENT
Start: 2020-01-27 | End: 2020-01-27 | Stop reason: HOSPADM

## 2020-01-27 RX ORDER — ATROPINE SULFATE 0.1 MG/ML
0.5 INJECTION INTRAVENOUS
Status: DISCONTINUED | OUTPATIENT
Start: 2020-01-27 | End: 2020-01-27 | Stop reason: HOSPADM

## 2020-01-27 RX ORDER — SODIUM CHLORIDE 9 MG/ML
75 INJECTION, SOLUTION INTRAVENOUS CONTINUOUS
Status: DISCONTINUED | OUTPATIENT
Start: 2020-01-27 | End: 2020-01-27 | Stop reason: HOSPADM

## 2020-01-27 RX ORDER — EPINEPHRINE 0.1 MG/ML
1 INJECTION INTRACARDIAC; INTRAVENOUS
Status: DISCONTINUED | OUTPATIENT
Start: 2020-01-27 | End: 2020-01-27 | Stop reason: HOSPADM

## 2020-01-27 RX ORDER — EPHEDRINE SULFATE/0.9% NACL/PF 50 MG/5 ML
SYRINGE (ML) INTRAVENOUS AS NEEDED
Status: DISCONTINUED | OUTPATIENT
Start: 2020-01-27 | End: 2020-01-27 | Stop reason: HOSPADM

## 2020-01-27 RX ADMIN — PROPOFOL 50 MG: 10 INJECTION, EMULSION INTRAVENOUS at 07:41

## 2020-01-27 RX ADMIN — PROPOFOL 50 MG: 10 INJECTION, EMULSION INTRAVENOUS at 08:04

## 2020-01-27 RX ADMIN — PROPOFOL 50 MG: 10 INJECTION, EMULSION INTRAVENOUS at 07:56

## 2020-01-27 RX ADMIN — SODIUM CHLORIDE 75 ML/HR: 900 INJECTION, SOLUTION INTRAVENOUS at 07:38

## 2020-01-27 RX ADMIN — PROPOFOL 30 MG: 10 INJECTION, EMULSION INTRAVENOUS at 07:45

## 2020-01-27 RX ADMIN — PROPOFOL 50 MG: 10 INJECTION, EMULSION INTRAVENOUS at 07:52

## 2020-01-27 RX ADMIN — LIDOCAINE HYDROCHLORIDE 20 MG: 20 INJECTION, SOLUTION EPIDURAL; INFILTRATION; INTRACAUDAL; PERINEURAL at 07:38

## 2020-01-27 RX ADMIN — GLYCOPYRROLATE 0.1 MG: 0.2 INJECTION, SOLUTION INTRAMUSCULAR; INTRAVENOUS at 07:48

## 2020-01-27 RX ADMIN — Medication 10 MG: at 08:13

## 2020-01-27 RX ADMIN — PROPOFOL 50 MG: 10 INJECTION, EMULSION INTRAVENOUS at 07:38

## 2020-01-27 RX ADMIN — PHENYLEPHRINE HYDROCHLORIDE 80 MCG: 10 INJECTION INTRAVENOUS at 08:13

## 2020-01-27 RX ADMIN — PROPOFOL 50 MG: 10 INJECTION, EMULSION INTRAVENOUS at 08:00

## 2020-01-27 RX ADMIN — GLYCOPYRROLATE 0.1 MG: 0.2 INJECTION, SOLUTION INTRAMUSCULAR; INTRAVENOUS at 07:51

## 2020-01-27 NOTE — H&P
Gastroenterology Outpatient History and Physical    Patient: Jesusita Bobby    Physician: Edgardo Hicks MD    Chief Complaint: Dysphagia/Constipation  History of Present Illness: No other complaints    History:  Past Medical History:   Diagnosis Date    Arthritis     back,feet    Chronic pain     back,feet    GERD (gastroesophageal reflux disease)     Hypertension     Obesity     Other ill-defined conditions(799.89)     heart murmur     Stroke ([facility] [address]     mini    Thyroid disease     hypo    Unspecified adverse effect of anesthesia     slow to wake up      Past Surgical History:   Procedure Laterality Date    HX ORTHOPAEDIC      left ankle surgery due to fx    HX TONSILLECTOMY      HX UROLOGICAL  2013    bladder biopsy    MO COLONOSCOPY W/BIOPSY SINGLE/MULTIPLE  2/28/2014         MO EGD BALLOON DILATION ESOPHAGUS <30 MM DIAM  11/5/2012         MO EGD TRANSORAL BIOPSY SINGLE/MULTIPLE  11/5/2012           Social History     Socioeconomic History    Marital status:      Spouse name: Not on file    Number of children: Not on file    Years of education: Not on file    Highest education level: Not on file   Tobacco Use    Smoking status: Never Smoker    Smokeless tobacco: Never Used   Substance and Sexual Activity    Alcohol use: No    Drug use: Yes     Types: Prescription, OTC    Sexual activity: Never      Family History   Problem Relation Age of Onset    Alcohol abuse Mother     Arthritis-osteo Mother     Cancer Father         testicular      Patient Active Problem List   Diagnosis Code    Essential hypertension, benign I10    Pure hypercholesterolemia E78.00    Overweight E66.3    Hypothyroidism E03.9    DJD (degenerative joint disease) M19.90    Overactive bladder N32.81    Diverticulosis K57.90    Obesity E66.9    DDD (degenerative disc disease), lumbar M51.36    Heart murmur R01.1    Advanced care planning/counseling discussion Z71.89    Nonrheumatic aortic valve stenosis I35.0       Allergies: Allergies   Allergen Reactions    Statins-Hmg-Coa Reductase Inhibitors Other (comments)     Severe pain and weakness    Amoxicillin Hives    Colestid [Colestipol] Myalgia    Pcn [Penicillins] Hives    Pravastatin Myalgia     Severe pain and weakness with all statins     Medications:   Prior to Admission medications    Medication Sig Start Date End Date Taking? Authorizing Provider   traMADol (ULTRAM) 50 mg tablet Take 1 Tab by mouth every eight (8) hours as needed for Pain for up to 90 days. Max Daily Amount: 150 mg. 1/23/20 4/22/20  Nilam Puckett MD   gabapentin (NEURONTIN) 100 mg capsule TAKE 1 CAPSULE BY MOUTH THREE TIMES A DAY 10/25/19   Nilam Puckett MD   oxybutynin chloride XL (DITROPAN XL) 15 mg CR tablet TAKE 1 TABLET BY MOUTH EVERY DAY 10/17/19   Nilam Puckett MD   levothyroxine (SYNTHROID) 50 mcg tablet TAKE 1 TABLET BY MOUTH EVERY DAY 8/23/19   Nilam Puckett MD   furosemide (LASIX) 20 mg tablet TAKE 1 TAB BY MOUTH DAILY AS NEEDED 8/6/19   Nilam Puckett MD   lisinopril-hydroCHLOROthiazide (PRINZIDE, ZESTORETIC) 20-12.5 mg per tablet TAKE 1 TABLET EVERY DAY 11/20/18   Nilam Puckett MD   WVZD-ISBJ-CLG-HDP-GLH-AEVY-HOR PO Take  by mouth. Provider, Historical   influenza vaccine 2017-18, 65 yrs+,,PF, (FLUZONE HIGH-DOSE 2017-18, PF,) syrg injection TO BE ADMINISTERED BY PHARMACIST FOR IMMUNIZATION 9/28/17   Provider, Historical   fluticasone-vilanterol (BREO ELLIPTA) 200-25 mcg/dose inhaler Take 1 Puff by inhalation daily. Provider, Historical   ANTIOX #11/OM3/DHA/EPA/LUT/YIN (OCUVITE ADULT 50+ PO) Take  by mouth daily. Provider, Historical   Omeprazole delayed release (PRILOSEC D/R) 20 mg tablet Take 20 mg by mouth daily. Provider, Historical   ascorbic acid-vitamin E-biotin (HAIR,SKIN & NAILS WITH BIOTIN) 7.5-7.5-1,250 mg-unit-mcg chew Take  by mouth.     Provider, Historical   OTHER Patient is taking Omega Red daily    Provider, Historical   ERIC ASPIRIN PO Take 325 mg by mouth daily. Extra strengh     Provider, Historical   OXYBUTYNIN CHLORIDE (DITROPAN PO) Take 15 mg by mouth daily. Provider, Historical   FOLIC ACID/MULTIVITS-MIN (ONE DAILY GUMMY VITES PO) Take 2 Tabs by mouth two (2) times a day. Provider, Historical   b complex vitamins tablet Take 1 Tab by mouth daily. Provider, Historical   IBUPROFEN (ADVIL LIQUI-GEL PO) Take 400 mg by mouth three (3) times daily. 2 tablets 3 times daily    Provider, Historical   ethinyl estradiol-norethindron (JINTELI) 1-5 mg-mcg Tab Take 1-5 mcg by mouth every other day. Provider, Historical   vitamin c-vitamin e (CRANBERRY CONCENTRATE) Cap Take 500 mg by mouth daily. Provider, Historical   fluticasone (FLONASE) 50 mcg/Actuation nasal spray 2 Sprays by Nasal route nightly. EVERY OTHER NIGHT    Provider, Historical   fexofenadine (ALLEGRA) 180 mg tablet Take  by mouth every thirty (30) days. 4/6/10   Provider, Historical   vitamin e (E GEMS) 1,000 unit capsule Take 1,000 Units by mouth daily. Provider, Historical   GINKGO BILOBA (GINKOBA PO) Take 120 mg by mouth two (2) times a day. Provider, Historical   POTASSIUM (POTASSIMIN PO) Take 550 mg by mouth daily. 1/2 tab    Provider, Historical     Physical Exam:   Vital Signs: Blood pressure 160/62, pulse 74, temperature 97.7 °F (36.5 °C), resp. rate 19, SpO2 98 %.   General: well developed, well nourished   HEENT: unremarkable   Heart: regular rhythm no mumur    Lungs: clear   Abdominal:  benign   Neurological: unremarkable   Extremities: no edema     Findings/Diagnosis: Dysphagia/Constipation  Plan of Care/Planned Procedure: EGD/Colon with conscious/deep sedation    Signed:  Sophy Diaz MD 1/27/2020

## 2020-01-27 NOTE — PERIOP NOTES
Pt esophagus dilated with 54 [place] savory. Anesthesia reports 330mg Propofol, 20mg Lidocaine and 800mL NS, 120mg Froylan, 0.2mg Robinol, and 10mg ephedrine given during procedure. Received report from anesthesia staff on vital signs and status of patient. Endoscope was pre-cleaned at the bedside immediately following procedure by [facility].     post procedure. Glasses taken to recovery with pt and given to recovery nurse to give to pt when pt ready to sit up.

## 2020-01-27 NOTE — PROCEDURES
NAME:  Norma Quinteros   :   1945   MRN:   [id number]     Date/Time:  2020 8:10 AM    Colonoscopy Operative Report    Procedure Type:   Colonoscopy --diagnostic     Indications:     Constipation  Pre-operative Diagnosis: see indication above  Post-operative Diagnosis:  See findings below  :  Adis Robles MD  Referring Provider: --Tomas Huddleston MD    Exam:  Airway: clear, no airway problems anticipated  Heart: RRR, without gallops or rubs  Lungs: clear bilaterally without wheezes, crackles, or rhonchi  Abdomen: soft, nontender, nondistended, bowel sounds present  Mental Status: awake, alert and oriented to person, place and time    Sedation:  MAC anesthesia Propofol  Procedure Details:  After informed consent was obtained with all risks and benefits of procedure explained and preoperative exam completed, the patient was taken to the endoscopy suite and placed in the left lateral decubitus position. Upon sequential sedation as per above, a digital rectal exam was performed demonstrating internal hemorrhoids. The Olympus videocolonoscope  was inserted in the rectum and carefully advanced to the cecum, which was identified by the ileocecal valve and appendiceal orifice. The quality of preparation was fair. The colonoscope was slowly withdrawn with careful evaluation between folds. Findings:  1. Severe pan-diverticulosis  2. Small internal hemorrhoids  3. Otherwise normal colonoscopy through to the cecum. Note the bowel preparation was suboptimal    Specimen Removed:  None  Complications: None. EBL:  None. Impression:  1. Severe pan-diverticulosis  2. Small internal hemorrhoids  3. Otherwise normal colonoscopy through to the cecum. Note the bowel preparation was suboptimal    Recommendations:   1. Continue bowel regimen  2. Repeat colonoscopy in 5 years with extended bowel preparation    Discharge Disposition:  Home in the company of a  when able to ambulate.       Caden Dewitt Janice Ackerman MD

## 2020-01-27 NOTE — DISCHARGE INSTRUCTIONS
Zayra Vines  [id number]  1945    COLON DISCHARGE INSTRUCTIONS  Discomfort:  Redness at IV site- apply warm compress to area; if redness or soreness persist- contact your physician  There may be a slight amount of blood passed from the rectum  Gaseous discomfort- walking, belching will help relieve any discomfort  You may not operate a vehicle for 12 hours  You may not engage in an occupation involving machinery or appliances for rest of today  You may not drink alcoholic beverages for at least 12 hours  Avoid making any critical decisions for at least 24 hour  DIET:   Regular diet. - however -  remember your colon is empty and a heavy meal will produce gas. Avoid these foods:  vegetables, fried / greasy foods, carbonated drinks for today  MEDICATION:  Per Medication Reconciliation       ACTIVITY:  You may not resume your normal daily activities until tomorrow AM; it is recommended that you spend the remainder of the day resting -  avoid any strenuous activity. CALL M.D. ANY SIGN OF:   Increasing pain, nausea, vomiting  Abdominal distension (swelling)  New increased bleeding (oral or rectal)  Fever (chills)  Pain in chest area  Bloody discharge from nose or mouth  Shortness of breath    You may not  take any Advil, Ibuprofen, Motrin, Aleve, or Goodys for 10 days, ONLY  Tylenol as needed for pain. IMPRESSION:  EGD:  Impression:    1. Normal proximal and mid esophagus. Biopsies taken to evaluate for Eosinophilic esophagitis  2. Mild Schatzki's ring. Dilated with a 54 Fr Savary  3. Small, sliding hiatal hernia  4. Moderate, patchy, non-erosive gastropathy in body and antrum with associated moderate amount of heme. Biopsied  5. Stomach otherwise normal, including retroflexion  6. Normal duodenal bulb and 2nd portion of the duodenum     Recommendations:  1. Follow up pathology    Colon:  Impression:  1. Severe pan-diverticulosis  2. Small internal hemorrhoids  3.  Otherwise normal colonoscopy through to the cecum. Note the bowel preparation was suboptimal    Recommendations:   1. Continue bowel regimen  2.  Repeat colonoscopy in 5 years with extended bowel preparatio    Follow-up Instructions:   Call Dr. Kuldeep Corley for the results of procedure / biopsy in 7-10 days  Telephone #[phone]      Carolyn Reyes MD

## 2020-01-27 NOTE — ANESTHESIA POSTPROCEDURE EVALUATION
Procedure(s):  ESOPHAGOGASTRODUODENOSCOPY (EGD)  COLONOSCOPY  ESOPHAGOGASTRODUODENAL (EGD) BIOPSY  ESOPHAGEAL DILATION. total IV anesthesia, general    Anesthesia Post Evaluation        Patient location during evaluation: PACU  Note status: Adequate. Level of consciousness: responsive to verbal stimuli and sleepy but conscious  Pain management: satisfactory to patient  Airway patency: patent  Anesthetic complications: no  Cardiovascular status: acceptable  Respiratory status: acceptable  Hydration status: acceptable  Comments: +Post-Anesthesia Evaluation and Assessment    Patient: Shelby Orellana MRN: [id number]  SSN: [id number]   YOB: 1945  Age: 69 y.o. Sex: female      Cardiovascular Function/Vital Signs    BP (P) 170/81   Pulse (P) 63   Temp 36.5 °C (97.7 °F)   Resp (P) 15   Ht 5' 3\" (1.6 m)   Wt 76.2 kg (168 lb)   SpO2 (P) 100%   BMI 29.76 kg/m²     Patient is status post Procedure(s):  ESOPHAGOGASTRODUODENOSCOPY (EGD)  COLONOSCOPY  ESOPHAGOGASTRODUODENAL (EGD) BIOPSY  ESOPHAGEAL DILATION. Nausea/Vomiting: Controlled. Postoperative hydration reviewed and adequate. Pain:  Pain Scale 1: Numeric (0 - 10) (01/27/20 0731)  Pain Intensity 1: 0 (01/27/20 0731)   Managed. Neurological Status: At baseline. Mental Status and Level of Consciousness: Arousable. Pulmonary Status:   O2 Device: (P) Nasal cannula (01/27/20 0815)   Adequate oxygenation and airway patent. Complications related to anesthesia: None    Post-anesthesia assessment completed. No concerns.     Signed By: Charisma Sanchez MD    1/27/2020  Post anesthesia nausea and vomiting:  controlled      Vitals Value Taken Time   BP 94/56 1/27/2020  8:10 AM   Temp     Pulse 59 1/27/2020  8:10 AM   Resp 16 1/27/2020  8:10 AM   SpO2 99 % 1/27/2020  8:10 AM

## 2020-01-27 NOTE — ANESTHESIA PREPROCEDURE EVALUATION
Anesthetic History          Comments: slow to wake up     Review of Systems / Medical History  Patient summary reviewed, nursing notes reviewed and pertinent labs reviewed    Pulmonary  Within defined limits                 Neuro/Psych         TIA     Cardiovascular    Hypertension: well controlled              Exercise tolerance: >4 METS  Comments: Nonrheumatic aortic valve stenosis     GI/Hepatic/Renal     GERD: well controlled          Comments: Diverticulosis Endo/Other      Hypothyroidism  Obesity and arthritis     Other Findings   Comments: Chronic pain  DJD   DDD            Physical Exam    Airway  Mallampati: II  TM Distance: 4 - 6 cm  Neck ROM: normal range of motion   Mouth opening: Normal     Cardiovascular  Regular rate and rhythm,  S1 and S2 normal,  no murmur, click, rub, or gallop  Rhythm: regular  Rate: normal         Dental  No notable dental hx       Pulmonary  Breath sounds clear to auscultation               Abdominal  GI exam deferred       Other Findings            Anesthetic Plan    ASA: 3  Anesthesia type: total IV anesthesia          Induction: Intravenous  Anesthetic plan and risks discussed with: Patient

## 2020-01-27 NOTE — PROGRESS NOTES
Antelmo Palmer  1945  [id number]    Situation:  Verbal report received from: Ely Landau RN  Procedure: Procedure(s):  ESOPHAGOGASTRODUODENOSCOPY (EGD)  COLONOSCOPY  ESOPHAGOGASTRODUODENAL (EGD) BIOPSY  ESOPHAGEAL DILATION    Background:    Preoperative diagnosis: dysphagia, constipation  Postoperative diagnosis: EGD: schatzki ring, Hiatl hernia, gastritis  Colonoscopy: Diverticulosis, Hemorrhoids    :  Dr. Abe Oscar  Assistant(s): Endoscopy RN-1: Brook Yanes RN  Endoscopy RN-2: Elvira Francois    Specimens:   ID Type Source Tests Collected by Time Destination   1 : Gastric Biopsy Preservative Gastric  Zack Tiwari MD 1/27/2020 4157 Pathology   2 : Mid Esophagus Biopsy Preservative Esophagus, Mid  Zack Tiwari MD 1/27/2020 2432 Pathology     H. Pylori  no    Assessment:  Intra-procedure medications     Anesthesia gave intra-procedure sedation and medications, see anesthesia flow sheet yes    Intravenous fluids: NS@ KVO     Vital signs stable    Abdominal assessment: round and soft     Recommendation:  Discharge patient per MD order.   Family or Friend DTR  Permission to share finding with family or friend yes

## 2020-02-05 RX ORDER — LISINOPRIL AND HYDROCHLOROTHIAZIDE 12.5; 2 MG/1; MG/1
TABLET ORAL
Qty: 90 TAB | Refills: 3 | Status: SHIPPED | OUTPATIENT
Start: 2020-02-05 | End: 2020-02-24 | Stop reason: SDUPTHER

## 2020-02-21 ENCOUNTER — TELEPHONE (OUTPATIENT)
Dept: INTERNAL MEDICINE CLINIC | Age: 75
End: 2020-02-21

## 2020-02-21 NOTE — TELEPHONE ENCOUNTER
Called, spoke to pt. Two identifiers confirmed. Notified pt she just had lipids drawn in August so she should not need another lipid panel. Pt verbalized understanding of information discussed w/ no further questions at this time.

## 2020-02-22 NOTE — PROGRESS NOTES
HISTORY OF PRESENT ILLNESS  Page Rees is a 69 y.o. female. HPI      6 month f/u HTN HLD ( statin intolerant)  hypothyroidism, chronic pain r/t oa of hands , back and feet, obesity (BMI 32) and medicare wellness------  Takes tramadol tid for back pain with sciatica--stopped both meds   Tolerating takes 2 advil tid which works ok    Last tsh was low--levothyroxine lowered to 50 mcg every day  Had egd/colon--s/p dilation , no polyps, only tics-Dr Arpan Terrazase    No longer dizzy since norvasc was stopped    Stopped neurontin td/t GI side effects and constipation and oral lesions--stopped it  Tramadol not being covered    DUE for DEXA  Declines mammograms    Last OV  Takes tamadol tid , back pain with sciatica.  Increased from bid d/t tid--walking was dfficult taking bid tramadol  This is a chronic problem that is not changed.  Per review of available records and patients , there are not sign of overuse, misuse, diversion, or concerning side effects. Today we reviewed: the risk of overdose, addiction, and dependency  The following changes were made to the patients current treatment plan: nothing, medications refilled.       dexa was ordered but not done yet     Has wrist bp cuff--bp 77/41 when felt faint      feels weak in muscles vbut better now after she increased levothyroxine to 1.5 tabs every day ( 75mcg every day)**  Takes lasix a few days per week for edema which helped     Last OV  Patient Active Problem List    Diagnosis Date Noted    Nonrheumatic aortic valve stenosis 06/08/2017    Heart murmur 05/24/2016    Advanced care planning/counseling discussion 05/24/2016    DDD (degenerative disc disease), lumbar 10/04/2013    Obesity 04/05/2013    Essential hypertension, benign 03/31/2010    Pure hypercholesterolemia 03/31/2010    Overweight 03/31/2010    Hypothyroidism 03/31/2010    DJD (degenerative joint disease) 03/31/2010    Overactive bladder 03/31/2010    Diverticulosis 03/31/2010     Current Outpatient Medications   Medication Sig Dispense Refill    lisinopril-hydroCHLOROthiazide (PRINZIDE, ZESTORETIC) 20-12.5 mg per tablet TAKE 1 TABLET BY MOUTH EVERY DAY 90 Tab 3    traMADol (ULTRAM) 50 mg tablet Take 1 Tab by mouth every eight (8) hours as needed for Pain for up to 90 days. Max Daily Amount: 150 mg. 120 Tab 1    gabapentin (NEURONTIN) 100 mg capsule TAKE 1 CAPSULE BY MOUTH THREE TIMES A  Cap 3    oxybutynin chloride XL (DITROPAN XL) 15 mg CR tablet TAKE 1 TABLET BY MOUTH EVERY DAY 90 Tab 3    levothyroxine (SYNTHROID) 50 mcg tablet TAKE 1 TABLET BY MOUTH EVERY DAY 90 Tab 3    furosemide (LASIX) 20 mg tablet TAKE 1 TAB BY MOUTH DAILY AS NEEDED 30 Tab 0    PACD-ZKON-CKQ-ZMH-ZDX-GQVM-HOR PO Take  by mouth.  influenza vaccine 2017-18, 65 yrs+,,PF, (FLUZONE HIGH-DOSE 2017-18, PF,) syrg injection TO BE ADMINISTERED BY PHARMACIST FOR IMMUNIZATION      fluticasone-vilanterol (BREO ELLIPTA) 200-25 mcg/dose inhaler Take 1 Puff by inhalation daily.  ANTIOX #11/OM3/DHA/EPA/LUT/YIN (OCUVITE ADULT 50+ PO) Take  by mouth daily.  Omeprazole delayed release (PRILOSEC D/R) 20 mg tablet Take 20 mg by mouth daily.  ascorbic acid-vitamin E-biotin (HAIR,SKIN & NAILS WITH BIOTIN) 7.5-7.5-1,250 mg-unit-mcg chew Take  by mouth.  OTHER Patient is taking Omega Red daily      ERIC ASPIRIN PO Take 325 mg by mouth daily. Extra strengh       OXYBUTYNIN CHLORIDE (DITROPAN PO) Take 15 mg by mouth daily.  FOLIC ACID/MULTIVITS-MIN (ONE DAILY GUMMY VITES PO) Take 2 Tabs by mouth two (2) times a day.  b complex vitamins tablet Take 1 Tab by mouth daily.  IBUPROFEN (ADVIL LIQUI-GEL PO) Take 400 mg by mouth three (3) times daily. 2 tablets 3 times daily      ethinyl estradiol-norethindron (JINTELI) 1-5 mg-mcg Tab Take 1-5 mcg by mouth every other day.  vitamin c-vitamin e (CRANBERRY CONCENTRATE) Cap Take 500 mg by mouth daily.       fluticasone (FLONASE) 50 mcg/Actuation nasal spray 2 Sprays by Nasal route nightly. EVERY OTHER NIGHT      fexofenadine (ALLEGRA) 180 mg tablet Take  by mouth every thirty (30) days.  vitamin e (E GEMS) 1,000 unit capsule Take 1,000 Units by mouth daily.  GINKGO BILOBA (GINKOBA PO) Take 120 mg by mouth two (2) times a day.  POTASSIUM (POTASSIMIN PO) Take 550 mg by mouth daily. 1/2 tab       Allergies   Allergen Reactions    Statins-Hmg-Coa Reductase Inhibitors Other (comments)     Severe pain and weakness    Amoxicillin Hives    Colestid [Colestipol] Myalgia    Pcn [Penicillins] Hives    Pravastatin Myalgia     Severe pain and weakness with all statins      Lab Results   Component Value Date/Time    WBC 7.5 08/22/2019 11:50 AM    HGB 13.2 08/22/2019 11:50 AM    HCT 39.2 08/22/2019 11:50 AM    PLATELET 464 63/63/6259 11:50 AM    MCV 88 08/22/2019 11:50 AM     Lab Results   Component Value Date/Time    Glucose 97 08/22/2019 11:50 AM    LDL, calculated 143 (H) 08/22/2019 11:50 AM    Creatinine 1.18 (H) 08/22/2019 11:50 AM      Lab Results   Component Value Date/Time    Cholesterol, total 222 (H) 08/22/2019 11:50 AM    Cholesterol (POC) 204 04/03/2012 10:59 AM    HDL Cholesterol 41 08/22/2019 11:50 AM    LDL, calculated 143 (H) 08/22/2019 11:50 AM    LDL Cholesterol (POC) 131 04/03/2012 10:59 AM    Triglyceride 190 (H) 08/22/2019 11:50 AM    Triglycerides (POC) 142 04/03/2012 10:59 AM     Lab Results   Component Value Date/Time    GFR est non-AA 46 (L) 08/22/2019 11:50 AM    GFR est AA 53 (L) 08/22/2019 11:50 AM    Creatinine 1.18 (H) 08/22/2019 11:50 AM    BUN 25 08/22/2019 11:50 AM    Sodium 138 08/22/2019 11:50 AM    Potassium 4.7 08/22/2019 11:50 AM    Chloride 98 08/22/2019 11:50 AM    CO2 24 08/22/2019 11:50 AM        ROS    Physical Exam  Vitals signs and nursing note reviewed. Constitutional:       Appearance: She is well-developed.       Comments: Appears stated age   Cardiovascular:      Rate and Rhythm: Normal rate and regular rhythm. Heart sounds: Normal heart sounds. No murmur. No friction rub. No gallop. Pulmonary:      Effort: Pulmonary effort is normal. No respiratory distress. Breath sounds: Normal breath sounds. No wheezing. Abdominal:      General: Bowel sounds are normal.      Palpations: Abdomen is soft. Neurological:      Mental Status: She is alert. ASSESSMENT and PLAN  Diagnoses and all orders for this visit:    1. Essential hypertension  -     METABOLIC PANEL, BASIC    2. Pure hypercholesterolemia  -     LIPID PANEL  -     METABOLIC PANEL, BASIC    3. Hypothyroidism, unspecified type  -     TSH 3RD GENERATION    4. Menopause  -     DEXA BONE DENSITY STUDY AXIAL; Future    5. Rosacea  -     REFERRAL TO DERMATOLOGY    6. Medicare annual wellness visit, subsequent    Other orders  -     lisinopril-hydroCHLOROthiazide (PRINZIDE, ZESTORETIC) 20-12.5 mg per tablet; TAKE 1 1/2 TABLET BY MOUTH EVERY DAY      Follow-up and Dispositions    · Return in about 6 months (around 8/24/2020) for htn hld. This is the Subsequent Medicare Annual Wellness Exam, performed 12 months or more after the Initial AWV or the last Subsequent AWV    I have reviewed the patient's medical history in detail and updated the computerized patient record.      History     Patient Active Problem List   Diagnosis Code    Essential hypertension, benign I10    Pure hypercholesterolemia E78.00    Overweight E66.3    Hypothyroidism E03.9    DJD (degenerative joint disease) M19.90    Overactive bladder N32.81    Diverticulosis K57.90    Obesity E66.9    DDD (degenerative disc disease), lumbar M51.36    Heart murmur R01.1    Advanced care planning/counseling discussion Z71.89    Nonrheumatic aortic valve stenosis I35.0     Past Medical History:   Diagnosis Date    Arthritis     back,feet    Chronic pain     back,feet    GERD (gastroesophageal reflux disease)     Hypertension     Obesity     Other ill-defined conditions(799.89)     heart murmur     Stroke [facility])     mini    Thyroid disease     hypo    Unspecified adverse effect of anesthesia     slow to wake up      Past Surgical History:   Procedure Laterality Date    COLONOSCOPY N/A 1/27/2020    COLONOSCOPY performed by Anastasia Johnson [facility]    HX ORTHOPAEDIC      left ankle surgery due to fx    HX TONSILLECTOMY      HX UROLOGICAL  2013    bladder biopsy    DC COLONOSCOPY W/BIOPSY SINGLE/MULTIPLE  2/28/2014         DC EGD BALLOON DILATION ESOPHAGUS <30 MM DIAM  11/5/2012         DC EGD TRANSORAL BIOPSY SINGLE/MULTIPLE  11/5/2012          Current Outpatient Medications   Medication Sig Dispense Refill    fluticasone propion-salmeteroL (ADVAIR DISKUS) 100-50 mcg/dose diskus inhaler Take 1 Puff by inhalation every twelve (12) hours. PRN      lisinopril-hydroCHLOROthiazide (PRINZIDE, ZESTORETIC) 20-12.5 mg per tablet TAKE 1 TABLET BY MOUTH EVERY DAY 90 Tab 3    oxybutynin chloride XL (DITROPAN XL) 15 mg CR tablet TAKE 1 TABLET BY MOUTH EVERY DAY 90 Tab 3    levothyroxine (SYNTHROID) 50 mcg tablet TAKE 1 TABLET BY MOUTH EVERY DAY 90 Tab 3    influenza vaccine 2017-18, 65 yrs+,,PF, (FLUZONE HIGH-DOSE 2017-18, PF,) syrg injection TO BE ADMINISTERED BY PHARMACIST FOR IMMUNIZATION      ANTIOX #11/OM3/DHA/EPA/LUT/YIN (OCUVITE ADULT 50+ PO) Take  by mouth daily.  Omeprazole delayed release (PRILOSEC D/R) 20 mg tablet Take 20 mg by mouth daily.  ascorbic acid-vitamin E-biotin (HAIR,SKIN & NAILS WITH BIOTIN) 7.5-7.5-1,250 mg-unit-mcg chew Take  by mouth.  OTHER Patient is taking Omega Red daily      ERIC ASPIRIN PO Take 325 mg by mouth daily. Extra strengh       FOLIC ACID/MULTIVITS-MIN (ONE DAILY GUMMY VITES PO) Take 2 Tabs by mouth two (2) times a day.  IBUPROFEN (ADVIL LIQUI-GEL PO) Take 400 mg by mouth three (3) times daily.  2 tablets 3 times daily      vitamin c-vitamin e (CRANBERRY CONCENTRATE) Cap Take 500 mg by mouth daily.      fluticasone (FLONASE) 50 mcg/Actuation nasal spray 2 Sprays by Nasal route nightly. EVERY OTHER NIGHT      fexofenadine (ALLEGRA) 180 mg tablet Take  by mouth every thirty (30) days.  vitamin e (E GEMS) 1,000 unit capsule Take 1,000 Units by mouth daily.  furosemide (LASIX) 20 mg tablet TAKE 1 TAB BY MOUTH DAILY AS NEEDED 30 Tab 0     Allergies   Allergen Reactions    Statins-Hmg-Coa Reductase Inhibitors Other (comments)     Severe pain and weakness    Amoxicillin Hives    Colestid [Colestipol] Myalgia    Gabapentin Other (comments)     Edema, GI problem    Pcn [Penicillins] Hives    Pravastatin Myalgia     Severe pain and weakness with all statins       Family History   Problem Relation Age of Onset    Alcohol abuse Mother     Arthritis-osteo Mother     Cancer Father         testicular     Social History     Tobacco Use    Smoking status: Never Smoker    Smokeless tobacco: Never Used   Substance Use Topics    Alcohol use: No       Depression Risk Factor Screening:     3 most recent PHQ Screens 2/24/2020   Little interest or pleasure in doing things Not at all   Feeling down, depressed, irritable, or hopeless Not at all   Total Score PHQ 2 0       Alcohol Risk Factor Screening:   Do you average 1 drink per night or more than 7 drinks a week:  No    On any one occasion in the past three months have you have had more than 3 drinks containing alcohol:  No      Functional Ability and Level of Safety:   Hearing: Hearing is good. Activities of Daily Living: The home contains: handrails  Patient does total self care    Ambulation: with no difficulty    Fall Risk:  Fall Risk Assessment, last 12 mths 2/24/2020   Able to walk? Yes   Fall in past 12 months?  No       Abuse Screen:  Patient is not abused    Cognitive Screening   Has your family/caregiver stated any concerns about your memory: no  Cognitive Screening: Normal - serial 3    Patient Care Team   Patient Care Team:  Lisseth Ontiveros MD PAT as PCP - General  Zoran Angelo MD as PCP - [facility] Empaneled Provider  James Leslie MD (Gastroenterology)  Guillermina Reeves MD (Ophthalmology)  Donte Acuna MD (Optometry)  Brenda Echevarria NP (Obstetrics & Gynecology)  Whit Escalante MD as Physician (Cardiology)    Assessment/Plan   Education and counseling provided:  Are appropriate based on today's review and evaluation  End-of-Life planning (with patient's consent)  Bone mass measurement (DEXA)  shingrix   Mammogram recommende\d    Diagnoses and all orders for this visit:    1. Essential hypertension  -     METABOLIC PANEL, BASIC   Mild elevation   Pt will increase lisinopril/hct to 1.5 tabs every day   To start home bp monitoring  2. Pure hypercholesterolemia  -     LIPID PANEL  -     METABOLIC PANEL, BASIC   Consider trial of zetia  3. Hypothyroidism, unspecified type  -     TSH 3RD GENERATION   Euthyroid clinically  4. Menopause  -     DEXA BONE DENSITY STUDY AXIAL; Future      5.  Rosacea   Refer to [facility] MD per pt req uest  Health Maintenance Due   Topic Date Due    Shingrix Vaccine Age 50> (1 of 2) 08/13/1997    Bone Densitometry  10/20/2013    GLAUCOMA SCREENING Q2Y  09/15/2016    Medicare Yearly Exam  11/13/2019    Breast Cancer Screen Mammogram  11/27/2019

## 2020-02-24 ENCOUNTER — HOSPITAL ENCOUNTER (OUTPATIENT)
Dept: LAB | Age: 75
Discharge: HOME | End: 2020-02-24
Payer: MEDICARE

## 2020-02-24 ENCOUNTER — OFFICE VISIT (OUTPATIENT)
Dept: INTERNAL MEDICINE CLINIC | Age: 75
End: 2020-02-24

## 2020-02-24 VITALS
HEIGHT: 63 IN | WEIGHT: 167 LBS | HEART RATE: 74 BPM | DIASTOLIC BLOOD PRESSURE: 76 MMHG | TEMPERATURE: 98.2 F | OXYGEN SATURATION: 94 % | BODY MASS INDEX: 29.59 KG/M2 | SYSTOLIC BLOOD PRESSURE: 140 MMHG | RESPIRATION RATE: 16 BRPM

## 2020-02-24 DIAGNOSIS — E78.00 PURE HYPERCHOLESTEROLEMIA: ICD-10-CM

## 2020-02-24 DIAGNOSIS — L71.9 ROSACEA: ICD-10-CM

## 2020-02-24 DIAGNOSIS — Z78.0 MENOPAUSE: ICD-10-CM

## 2020-02-24 DIAGNOSIS — I10 ESSENTIAL HYPERTENSION: Primary | ICD-10-CM

## 2020-02-24 DIAGNOSIS — E03.9 HYPOTHYROIDISM, UNSPECIFIED TYPE: ICD-10-CM

## 2020-02-24 DIAGNOSIS — Z00.00 MEDICARE ANNUAL WELLNESS VISIT, SUBSEQUENT: ICD-10-CM

## 2020-02-24 PROCEDURE — 36415 COLL VENOUS BLD VENIPUNCTURE: CPT

## 2020-02-24 PROCEDURE — 80048 BASIC METABOLIC PNL TOTAL CA: CPT

## 2020-02-24 PROCEDURE — 80061 LIPID PANEL: CPT

## 2020-02-24 PROCEDURE — 84443 ASSAY THYROID STIM HORMONE: CPT

## 2020-02-24 RX ORDER — FLUTICASONE PROPIONATE AND SALMETEROL 100; 50 UG/1; UG/1
1 POWDER RESPIRATORY (INHALATION) EVERY 12 HOURS
COMMUNITY
End: 2021-12-08 | Stop reason: DRUGHIGH

## 2020-02-24 RX ORDER — LISINOPRIL AND HYDROCHLOROTHIAZIDE 12.5; 2 MG/1; MG/1
TABLET ORAL
Qty: 135 TAB | Refills: 3 | Status: SHIPPED | OUTPATIENT
Start: 2020-02-24 | End: 2020-09-16

## 2020-02-24 NOTE — PATIENT INSTRUCTIONS
Office Policies Phone calls/patient messages: Please allow up to 24 hours for someone in the office to contact you about your call or message. Be mindful your provider may be out of the office or your message may require further review. We encourage you to use [employer] for your messages as this is a faster, more efficient way to communicate with our office Medication Refills: 
         
Prescription medications require 48-72 business hours to process. We encourage you to use [employer] for your refills. For controlled medications: Please allow 72 business hours to process. Certain medications may require you to  a written prescription at our office. NO narcotic/controlled medications will be prescribed after 4pm Monday through Friday or on weekends Form/Paperwork Completion: 
         
Please note a $25 fee may incur for all paperwork for completed by our providers. We ask that you allow 7-10 business days. Pre-payment is due prior to picking up/faxing the completed form. You may also download your forms to [employer] to have your doctor print off. Medicare Wellness Visit, Female The best way to live healthy is to have a lifestyle where you eat a well-balanced diet, exercise regularly, limit alcohol use, and quit all forms of tobacco/nicotine, if applicable. Regular preventive services are another way to keep healthy. Preventive services (vaccines, screening tests, monitoring & exams) can help personalize your care plan, which helps you manage your own care. Screening tests can find health problems at the earliest stages, when they are easiest to treat. [facility] follows the current, evidence-based guidelines published by the [facility] [employer] (USPSTF) when recommending preventive services for our patients.  Because we follow these guidelines, sometimes recommendations change over time as research supports it. (For example, mammograms used to be recommended annually. Even though Medicare will still pay for an annual mammogram, the newer guidelines recommend a mammogram every two years for women of average risk). Of course, you and your doctor may decide to screen more often for some diseases, based on your risk and your co-morbidities (chronic disease you are already diagnosed with). Preventive services for you include: - Medicare offers their members a free annual wellness visit, which is time for you and your primary care provider to discuss and plan for your preventive service needs. Take advantage of this benefit every year! 
-All adults over the age of 72 should receive the recommended pneumonia vaccines. Current USPSTF guidelines recommend a series of two vaccines for the best pneumonia protection.  
-All adults should have a flu vaccine yearly and a tetanus vaccine every 10 years.  
-All adults age 48 and older should receive the shingles vaccines (series of two vaccines). -All adults age 38-68 who are overweight should have a diabetes screening test once every three years.  
-All adults born between [birth year] and 1965 should be screened once for Hepatitis C. 
-Other screening tests and preventive services for persons with diabetes include: an eye exam to screen for diabetic retinopathy, a kidney function test, a foot exam, and stricter control over your cholesterol.  
-Cardiovascular screening for adults with routine risk involves an electrocardiogram (ECG) at intervals determined by your doctor.  
-Colorectal cancer screenings should be done for adults age 54-65 with no increased risk factors for colorectal cancer. There are a number of acceptable methods of screening for this type of cancer. Each test has its own benefits and drawbacks.  Discuss with your doctor what is most appropriate for you during your annual wellness visit. The different tests include: colonoscopy (considered the best screening method), a fecal occult blood test, a fecal DNA test, and sigmoidoscopy. 
 
-A bone mass density test is recommended when a woman turns 65 to screen for osteoporosis. This test is only recommended one time, as a screening. Some providers will use this same test as a disease monitoring tool if you already have osteoporosis. -Breast cancer screenings are recommended every other year for women of normal risk, age 54-69. 
-Cervical cancer screenings for women over age 72 are only recommended with certain risk factors. Here is a list of your current Health Maintenance items (your personalized list of preventive services) with a due date: 
Health Maintenance Due Topic Date Due  Shingles Vaccine (1 of 2) 08/13/1997  Bone Densitometry  10/20/2013  Glaucoma Screening   09/15/2016 [facility] Annual Well Visit  11/13/2019  Mammogram  11/27/2019

## 2020-02-25 DIAGNOSIS — E03.9 HYPOTHYROIDISM, UNSPECIFIED TYPE: Primary | ICD-10-CM

## 2020-02-25 LAB
BUN SERPL-MCNC: 20 MG/DL (ref 8–27)
BUN/CREAT SERPL: 24 (ref 12–28)
CALCIUM SERPL-MCNC: 10.2 MG/DL (ref 8.7–10.3)
CHLORIDE SERPL-SCNC: 101 MMOL/L (ref 96–106)
CHOLEST SERPL-MCNC: 242 MG/DL (ref 100–199)
CO2 SERPL-SCNC: 22 MMOL/L (ref 20–29)
CREAT SERPL-MCNC: 0.85 MG/DL (ref 0.57–1)
GLUCOSE SERPL-MCNC: 93 MG/DL (ref 65–99)
HDLC SERPL-MCNC: 52 MG/DL
LDLC SERPL CALC-MCNC: 164 MG/DL (ref 0–99)
POTASSIUM SERPL-SCNC: 4.4 MMOL/L (ref 3.5–5.2)
SODIUM SERPL-SCNC: 141 MMOL/L (ref 134–144)
TRIGL SERPL-MCNC: 130 MG/DL (ref 0–149)
TSH SERPL DL<=0.005 MIU/L-ACNC: 0.31 UIU/ML (ref 0.45–4.5)
VLDLC SERPL CALC-MCNC: 26 MG/DL (ref 5–40)

## 2020-02-25 RX ORDER — LEVOTHYROXINE SODIUM 25 UG/1
25 TABLET ORAL
Qty: 90 TAB | Refills: 1 | Status: SHIPPED | OUTPATIENT
Start: 2020-02-25 | End: 2020-08-19

## 2020-02-25 NOTE — PROGRESS NOTES
Tell pt thyroid level is too high--lower dose of levothyroxine from 50 mcg every day to 25 mcg every day and repeat tsh in 6-8 weeks-ordered  Cholesterol levels are mildly elevatred--has tried several medicines with side effects--work on diet and exercise.

## 2020-02-26 NOTE — PROGRESS NOTES
Called, spoke to pt. Two identifiers confirmed. Notified pt of lab results/recommendations per Dr. Elsa Dey. Pt verbalized understanding of information discussed w/ no further questions at this time.

## 2020-03-11 ENCOUNTER — HOSPITAL ENCOUNTER (OUTPATIENT)
Dept: MRI IMAGING | Age: 75
Discharge: HOME | End: 2020-03-11
Attending: ORTHOPAEDIC SURGERY
Payer: MEDICARE

## 2020-03-11 DIAGNOSIS — M47.817 LUMBOSACRAL SPONDYLOSIS WITHOUT MYELOPATHY: ICD-10-CM

## 2020-03-11 DIAGNOSIS — M54.50 LUMBAR PAIN: ICD-10-CM

## 2020-03-11 DIAGNOSIS — M43.10 ACQUIRED SPONDYLOLISTHESIS: ICD-10-CM

## 2020-03-11 DIAGNOSIS — M54.31 SCIATICA OF RIGHT SIDE: ICD-10-CM

## 2020-03-11 PROCEDURE — 72148 MRI LUMBAR SPINE W/O DYE: CPT

## 2020-08-19 RX ORDER — LEVOTHYROXINE SODIUM 25 UG/1
TABLET ORAL
Qty: 90 TAB | Refills: 1 | Status: SHIPPED | OUTPATIENT
Start: 2020-08-19 | End: 2021-02-08

## 2020-08-23 NOTE — PROGRESS NOTES
HISTORY OF PRESENT ILLNESS  Triny Castro is a 70 y.o. female. HPI   Triny Castro is a 70 y.o. female being evaluated by a Virtual Visit (video visit) encounter to address concerns as mentioned above. A caregiver was present when appropriate. Due to this being a TeleHealth encounter (During IIPSF-99 public health emergency), evaluation of the following organ systems was limited: Vitals/Constitutional/EENT/Resp/CV/GI//MS/Neuro/Skin/Heme-Lymph-Imm. Pursuant to the emergency declaration under the [facility], [address] [place] [address] authority and the [employer] Act, this Virtual Visit was conducted with patient's (and/or legal guardian's) consent, to reduce the risk of exposure to COVID-19 and provide necessary medical care. Services were provided through a video synchronous discussion virtually to substitute for in-person encounter. --Lisa Armstrong MD on 8/22/2020 at 9:53 PM    An electronic signature was used to authenticate this note.     6 month f/u HTN HLD ( statin intolerant)  hypothyroidism, chronic pain r/t oa of hands , back and feet, obesity (BMI 32)  lisinopril dose was increased to 1 1/2 tabs every day last OV  Levothyroxine dose was lowered to 25 mcg every day lat OV  Last -----zetia caused muscle weakness and pain as well as statins.  Does nto want to try another medication  Home BP readings -none  Hx mild AS  Hx gerd--had EGD in the past year per pt--  Will reschedule DEXA    Last OV  Takes tramadol tid for back pain with sciatica--stopped both meds   Tolerating takes 2 advil tid which works ok     Last tsh was low--levothyroxine lowered to 50 mcg every day  Had egd/colon--s/p dilation , no polyps, only tics-Dr Juju Knutson     No longer dizzy since norvasc was stopped     Stopped neurontin td/t GI side effects and constipation and oral lesions--stopped it  Tramadol not being covered     DUE for DEXA  Declines mammograms    Patient Active Problem List    Diagnosis Date Noted    Nonrheumatic aortic valve stenosis 06/08/2017    Heart murmur 05/24/2016    Advanced care planning/counseling discussion 05/24/2016    DDD (degenerative disc disease), lumbar 10/04/2013    Obesity 04/05/2013    Essential hypertension, benign 03/31/2010    Pure hypercholesterolemia 03/31/2010    Overweight 03/31/2010    Hypothyroidism 03/31/2010    DJD (degenerative joint disease) 03/31/2010    Overactive bladder 03/31/2010    Diverticulosis 03/31/2010     Current Outpatient Medications   Medication Sig Dispense Refill    meloxicam (Mobic) 15 mg tablet Take 1/2 to 1 tablet daily with food for pain / inflammation.  DULoxetine (CYMBALTA) 20 mg capsule TAKE 1 CAPSULE BY MOUTH EVERY DAY      B.infantis-B.ani-B.long-B.bifi (Probiotic 4X) 10-15 mg TbEC Take  by mouth.  levothyroxine (SYNTHROID) 25 mcg tablet TAKE 1 TABLET BY MOUTH EVERY DAY BEFORE BREAKFAST 90 Tab 1    fluticasone propion-salmeteroL (ADVAIR DISKUS) 100-50 mcg/dose diskus inhaler Take 1 Puff by inhalation every twelve (12) hours. PRN      lisinopril-hydroCHLOROthiazide (PRINZIDE, ZESTORETIC) 20-12.5 mg per tablet TAKE 1 1/2 TABLET BY MOUTH EVERY  Tab 3    oxybutynin chloride XL (DITROPAN XL) 15 mg CR tablet TAKE 1 TABLET BY MOUTH EVERY DAY 90 Tab 3    furosemide (LASIX) 20 mg tablet TAKE 1 TAB BY MOUTH DAILY AS NEEDED 30 Tab 0    ANTIOX #11/OM3/DHA/EPA/LUT/YIN (OCUVITE ADULT 50+ PO) Take  by mouth daily.  Omeprazole delayed release (PRILOSEC D/R) 20 mg tablet Take 20 mg by mouth daily.  ascorbic acid-vitamin E-biotin (HAIR,SKIN & NAILS WITH BIOTIN) 7.5-7.5-1,250 mg-unit-mcg chew Take  by mouth.  OTHER Patient is taking Omega Red daily      FOLIC ACID/MULTIVITS-MIN (ONE DAILY GUMMY VITES PO) Take 2 Tabs by mouth two (2) times a day.  vitamin c-vitamin e (CRANBERRY CONCENTRATE) Cap Take 500 mg by mouth daily.       fluticasone (FLONASE) 50 mcg/Actuation nasal spray 2 Sprays by Nasal route nightly. EVERY OTHER NIGHT      fexofenadine (ALLEGRA) 180 mg tablet Take  by mouth every thirty (30) days.  vitamin e (E GEMS) 1,000 unit capsule Take 1,000 Units by mouth daily.  influenza vaccine 2017-18, 65 yrs+,,PF, (FLUZONE HIGH-DOSE 2017-18, PF,) syrg injection TO BE ADMINISTERED BY PHARMACIST FOR IMMUNIZATION      ERIC ASPIRIN PO Take 325 mg by mouth daily. Extra strengh       IBUPROFEN (ADVIL LIQUI-GEL PO) Take 400 mg by mouth three (3) times daily. 2 tablets 3 times daily       Allergies   Allergen Reactions    Statins-Hmg-Coa Reductase Inhibitors Other (comments)     Severe pain and weakness    Amoxicillin Hives    Colestid [Colestipol] Myalgia    Gabapentin Other (comments)     Edema, GI problem    Pcn [Penicillins] Hives    Pravastatin Myalgia     Severe pain and weakness with all statins      Lab Results   Component Value Date/Time    WBC 7.5 08/22/2019 11:50 AM    HGB 13.2 08/22/2019 11:50 AM    HCT 39.2 08/22/2019 11:50 AM    PLATELET 900 78/79/9529 11:50 AM    MCV 88 08/22/2019 11:50 AM     Lab Results   Component Value Date/Time    Glucose 93 02/24/2020 12:33 PM    LDL, calculated 164 (H) 02/24/2020 12:33 PM    Creatinine 0.85 02/24/2020 12:33 PM      Lab Results   Component Value Date/Time    Cholesterol, total 242 (H) 02/24/2020 12:33 PM    Cholesterol (POC) 204 04/03/2012 10:59 AM    HDL Cholesterol 52 02/24/2020 12:33 PM    LDL, calculated 164 (H) 02/24/2020 12:33 PM    LDL Cholesterol (POC) 131 04/03/2012 10:59 AM    Triglyceride 130 02/24/2020 12:33 PM    Triglycerides (POC) 142 04/03/2012 10:59 AM     No results found for: PSA, Delayne Keto, PSAR3, [id number], [id number], PSALT  Lab Results   Component Value Date/Time    TSH 0.310 (L) 02/24/2020 12:33 PM         ROS    Physical Exam  Vitals signs and nursing note reviewed. Constitutional:       Appearance: She is well-developed. Comments: Appears stated age   Cardiovascular:      Rate and Rhythm: Normal rate and regular rhythm. Heart sounds: Murmur present. No friction rub. No gallop. Pulmonary:      Effort: Pulmonary effort is normal. No respiratory distress. Breath sounds: Normal breath sounds. No wheezing. Abdominal:      General: Bowel sounds are normal.      Palpations: Abdomen is soft. Neurological:      Mental Status: She is alert. ASSESSMENT and PLAN  Diagnoses and all orders for this visit:    1. Essential hypertension  -     CBC W/O DIFF  -     METABOLIC PANEL, BASIC   controlled  2. Hypothyroidism, unspecified type  -     TSH 3RD GENERATION    3. Pure hypercholesterolemia  -     LIPID PANEL   Continue to manage with diet and exercise    On otc omega red  4. Nonrheumatic aortic valve stenosis  -     REFERRAL TO CARDIOLOGY   F/u mild AS-asymptomatic    Follow-up and Dispositions    · Return in about 6 months (around 2/24/2021) for medicare wellness.

## 2020-08-24 ENCOUNTER — OFFICE VISIT (OUTPATIENT)
Dept: INTERNAL MEDICINE CLINIC | Age: 75
End: 2020-08-24
Payer: MEDICARE

## 2020-08-24 VITALS
RESPIRATION RATE: 16 BRPM | TEMPERATURE: 96.6 F | SYSTOLIC BLOOD PRESSURE: 136 MMHG | WEIGHT: 170 LBS | HEIGHT: 63 IN | OXYGEN SATURATION: 98 % | HEART RATE: 66 BPM | DIASTOLIC BLOOD PRESSURE: 68 MMHG | BODY MASS INDEX: 30.12 KG/M2

## 2020-08-24 DIAGNOSIS — I10 ESSENTIAL HYPERTENSION: Primary | ICD-10-CM

## 2020-08-24 DIAGNOSIS — I35.0 NONRHEUMATIC AORTIC VALVE STENOSIS: ICD-10-CM

## 2020-08-24 DIAGNOSIS — E03.9 HYPOTHYROIDISM, UNSPECIFIED TYPE: ICD-10-CM

## 2020-08-24 DIAGNOSIS — E78.00 PURE HYPERCHOLESTEROLEMIA: ICD-10-CM

## 2020-08-24 PROCEDURE — G8417 CALC BMI ABV UP PARAM F/U: HCPCS | Performed by: INTERNAL MEDICINE

## 2020-08-24 PROCEDURE — G8427 DOCREV CUR MEDS BY ELIG CLIN: HCPCS | Performed by: INTERNAL MEDICINE

## 2020-08-24 PROCEDURE — G8754 DIAS BP LESS 90: HCPCS | Performed by: INTERNAL MEDICINE

## 2020-08-24 PROCEDURE — G8510 SCR DEP NEG, NO PLAN REQD: HCPCS | Performed by: INTERNAL MEDICINE

## 2020-08-24 PROCEDURE — G8752 SYS BP LESS 140: HCPCS | Performed by: INTERNAL MEDICINE

## 2020-08-24 PROCEDURE — G0463 HOSPITAL OUTPT CLINIC VISIT: HCPCS | Performed by: INTERNAL MEDICINE

## 2020-08-24 PROCEDURE — 3017F COLORECTAL CA SCREEN DOC REV: CPT | Performed by: INTERNAL MEDICINE

## 2020-08-24 PROCEDURE — 1101F PT FALLS ASSESS-DOCD LE1/YR: CPT | Performed by: INTERNAL MEDICINE

## 2020-08-24 PROCEDURE — 1090F PRES/ABSN URINE INCON ASSESS: CPT | Performed by: INTERNAL MEDICINE

## 2020-08-24 PROCEDURE — G8536 NO DOC ELDER MAL SCRN: HCPCS | Performed by: INTERNAL MEDICINE

## 2020-08-24 PROCEDURE — G8399 PT W/DXA RESULTS DOCUMENT: HCPCS | Performed by: INTERNAL MEDICINE

## 2020-08-24 PROCEDURE — 99214 OFFICE O/P EST MOD 30 MIN: CPT | Performed by: INTERNAL MEDICINE

## 2020-08-24 RX ORDER — DULOXETIN HYDROCHLORIDE 20 MG/1
CAPSULE, DELAYED RELEASE ORAL
COMMUNITY
Start: 2020-08-09 | End: 2021-07-22

## 2020-08-24 RX ORDER — MELOXICAM 15 MG/1
TABLET ORAL
COMMUNITY
Start: 2020-03-02 | End: 2020-10-02

## 2020-08-24 RX ORDER — CHOLECALCIFEROL (VITAMIN D3) 50 MCG
1 CAPSULE ORAL DAILY
COMMUNITY

## 2020-08-24 NOTE — PATIENT INSTRUCTIONS
Chief Complaint Patient presents with  Hypertension 6 month follow up  Thyroid Problem 6 month follow up  Medication Evaluation New medications Office Policies Phone calls/patient messages: Please allow up to 24 hours for someone in the office to contact you about your call or message. Be mindful your provider may be out of the office or your message may require further review. We encourage you to use [employer] for your messages as this is a faster, more efficient way to communicate with our office Medication Refills: 
         
Prescription medications require 48-72 business hours to process. We encourage you to use [employer] for your refills. For controlled medications: Please allow 72 business hours to process. Certain medications may require you to  a written prescription at our office. NO narcotic/controlled medications will be prescribed after 4pm Monday through Friday or on weekends Form/Paperwork Completion: 
         
Please note a $25 fee may incur for all paperwork for completed by our providers. We ask that you allow 7-10 business days. Pre-payment is due prior to picking up/faxing the completed form. You may also download your forms to [employer] to have your doctor print off.

## 2020-09-02 ENCOUNTER — HOSPITAL ENCOUNTER (OUTPATIENT)
Dept: LAB | Age: 75
Discharge: HOME | End: 2020-09-02
Payer: MEDICARE

## 2020-09-02 PROCEDURE — 36415 COLL VENOUS BLD VENIPUNCTURE: CPT

## 2020-09-02 PROCEDURE — 84443 ASSAY THYROID STIM HORMONE: CPT

## 2020-09-02 PROCEDURE — 85027 COMPLETE CBC AUTOMATED: CPT

## 2020-09-02 PROCEDURE — 80048 BASIC METABOLIC PNL TOTAL CA: CPT

## 2020-09-02 PROCEDURE — 80061 LIPID PANEL: CPT

## 2020-09-03 DIAGNOSIS — E87.5 HYPERKALEMIA: Primary | ICD-10-CM

## 2020-09-03 LAB
BUN SERPL-MCNC: 29 MG/DL (ref 8–27)
BUN/CREAT SERPL: 25 (ref 12–28)
CALCIUM SERPL-MCNC: 9.8 MG/DL (ref 8.7–10.3)
CHLORIDE SERPL-SCNC: 103 MMOL/L (ref 96–106)
CHOLEST SERPL-MCNC: 236 MG/DL (ref 100–199)
CO2 SERPL-SCNC: 24 MMOL/L (ref 20–29)
CREAT SERPL-MCNC: 1.15 MG/DL (ref 0.57–1)
ERYTHROCYTE [DISTWIDTH] IN BLOOD BY AUTOMATED COUNT: 16.9 % (ref 11.7–15.4)
GLUCOSE SERPL-MCNC: 99 MG/DL (ref 65–99)
HCT VFR BLD AUTO: 37.5 % (ref 34–46.6)
HDLC SERPL-MCNC: 58 MG/DL
HGB BLD-MCNC: 11.7 G/DL (ref 11.1–15.9)
LDLC SERPL CALC-MCNC: 159 MG/DL (ref 0–99)
MCH RBC QN AUTO: 26.2 PG (ref 26.6–33)
MCHC RBC AUTO-ENTMCNC: 31.2 G/DL (ref 31.5–35.7)
MCV RBC AUTO: 84 FL (ref 79–97)
PLATELET # BLD AUTO: 302 X10E3/UL (ref 150–450)
POTASSIUM SERPL-SCNC: 5.6 MMOL/L (ref 3.5–5.2)
RBC # BLD AUTO: 4.46 X10E6/UL (ref 3.77–5.28)
SODIUM SERPL-SCNC: 139 MMOL/L (ref 134–144)
TRIGL SERPL-MCNC: 109 MG/DL (ref 0–149)
TSH SERPL DL<=0.005 MIU/L-ACNC: 1.08 UIU/ML (ref 0.45–4.5)
VLDLC SERPL CALC-MCNC: 19 MG/DL (ref 5–40)
WBC # BLD AUTO: 5.7 X10E3/UL (ref 3.4–10.8)

## 2020-09-04 NOTE — PROGRESS NOTES
Tell pt normal thyrod level, blood cell counts, glucose. choleseterol level is mildly elevated but stable--continue diet and exerise  Kidney function is slightly impaired and potassium is up--hydrate.  Hold lisinopril/hct for 3days and repeat BMP Monday next week

## 2020-09-08 NOTE — PROGRESS NOTES
Called, spoke to pt. Two identifiers confirmed. Notified pt of lab results/recommendations per Dr. Ann Shultz. Pt verbalized understanding of information discussed w/ no further questions at this time. Lab order faxed to [address] [place].

## 2020-09-15 ENCOUNTER — HOSPITAL ENCOUNTER (OUTPATIENT)
Dept: LAB | Age: 75
Discharge: HOME | End: 2020-09-15
Payer: MEDICARE

## 2020-09-15 PROCEDURE — 80048 BASIC METABOLIC PNL TOTAL CA: CPT

## 2020-09-15 PROCEDURE — 36415 COLL VENOUS BLD VENIPUNCTURE: CPT

## 2020-09-16 ENCOUNTER — TELEPHONE (OUTPATIENT)
Dept: INTERNAL MEDICINE CLINIC | Age: 75
End: 2020-09-16

## 2020-09-16 DIAGNOSIS — N18.30 CKD (CHRONIC KIDNEY DISEASE) STAGE 3, GFR 30-59 ML/MIN (HCC): Primary | ICD-10-CM

## 2020-09-16 LAB
BUN SERPL-MCNC: 28 MG/DL (ref 8–27)
BUN/CREAT SERPL: 22 (ref 12–28)
CALCIUM SERPL-MCNC: 9.7 MG/DL (ref 8.7–10.3)
CHLORIDE SERPL-SCNC: 105 MMOL/L (ref 96–106)
CO2 SERPL-SCNC: 23 MMOL/L (ref 20–29)
CREAT SERPL-MCNC: 1.3 MG/DL (ref 0.57–1)
GLUCOSE SERPL-MCNC: 96 MG/DL (ref 65–99)
POTASSIUM SERPL-SCNC: 5.4 MMOL/L (ref 3.5–5.2)
SODIUM SERPL-SCNC: 141 MMOL/L (ref 134–144)

## 2020-09-16 RX ORDER — AMLODIPINE BESYLATE 5 MG/1
5 TABLET ORAL DAILY
Qty: 90 TAB | Refills: 3 | Status: SHIPPED | OUTPATIENT
Start: 2020-09-16 | End: 2021-10-31

## 2020-09-16 NOTE — PROGRESS NOTES
Tell pt her kidney funciton is still impaired. Stop lisinoprilk /hct and stop any NSAIDS---Mobic or otc advil.   Low potasisium diet  I will escrinbed norvasc for HTN to replace lis/hct  Refer to renal Dr Tomas Craig or associate please for CKD3

## 2020-09-16 NOTE — TELEPHONE ENCOUNTER
Called, spoke to pt. Two identifiers confirmed. Notified pt of lab results/recommendations per Dr. Aura Paintign. Pt verbalized understanding of information discussed w/ no further questions at this time.

## 2020-09-16 NOTE — PROGRESS NOTES
Called, spoke to pt. Two identifiers confirmed. Notified pt of lab results/recommendations per Dr. Gail Mccann. Contact information provided for Dr. Doris Doran. Pt verbalized understanding of information discussed w/ no further questions at this time. Records faxed to Dr. Kailee Chacon office.

## 2020-09-23 ENCOUNTER — TELEPHONE (OUTPATIENT)
Dept: INTERNAL MEDICINE CLINIC | Age: 75
End: 2020-09-23

## 2020-09-23 NOTE — TELEPHONE ENCOUNTER
----- Message from Roxanne Coffey sent at 9/23/2020  3:04 PM EDT -----  Regarding: Dr. Jose Tucker first and last name and relationship (if not the patient): Pt  Best contact number(s): [phone]  Whose call is being returned: Zay Morales  Details to clarify the request: N/A

## 2020-09-25 RX ORDER — SOLIFENACIN SUCCINATE 10 MG/1
10 TABLET, FILM COATED ORAL DAILY
Qty: 90 TAB | Refills: 1 | Status: SHIPPED | OUTPATIENT
Start: 2020-09-25 | End: 2020-09-25 | Stop reason: CLARIF

## 2020-09-25 RX ORDER — DARIFENACIN HYDROBROMIDE 15 MG/1
15 TABLET, EXTENDED RELEASE ORAL DAILY
Qty: 90 TAB | Refills: 3 | Status: SHIPPED | OUTPATIENT
Start: 2020-09-25 | End: 2020-10-02

## 2020-09-25 NOTE — TELEPHONE ENCOUNTER
Peyton Austin MD  You 24 minutes ago (9:57 AM)      I escribed vesicare to try in place of ditropan    Message text       Peyton Austin MD  You 25 minutes ago (9:56 AM)      Ask pt if she wants to restart tramadol tid prn pain and or try voltaren gel for OA pain of knees. Can see ortho MD for cortisone. Message text      Pt notified. Pt declined starting tramadol again and will try OTC medications first.     versicare not covered by pt's insurance. Notified pt I would attempt a PA. Pt verbalized understanding of information discussed w/ no further questions at this time.

## 2020-09-25 NOTE — TELEPHONE ENCOUNTER
Called, spoke to pt. Two identifiers confirmed. Pt stated she is in a lot of pain with her knees and arthritis. Pt was instructed on 9/16 to hold all NSAIDs. Pt stated her meloxicam is the only medication that will help with pain and swelling. Pt also stated her oxybutynin is no longer effective. Pt would like to increase dosage to one and half tablets a day. Notified pt a message will be sent to Dr. Ni Greenberg. Pt verbalized understanding of information discussed w/ no further questions at this time.

## 2020-09-27 NOTE — TELEPHONE ENCOUNTER
Tania Vidales, [facility]               General Message/Vendor Calls     Caller's first and last name:       Reason for call: Pt stated that the Darifenacin is too expensive and would like to be prescribed something else.        Callback required yes/no and why:   Yes     Best contact number(s): [phone]       Details to clarify the request:       Theotis Laws after closing on 9/25/20

## 2020-09-28 NOTE — TELEPHONE ENCOUNTER
Isreal Harvey MD  You 3 days ago      I escribed enablex. If not covered she needs to contact insurance to find out which other med is covered . Message text      Called, left vm for pt to return call to office.

## 2020-09-30 NOTE — PROGRESS NOTES
Nir Valencia, FNP-BC    Subjective/HPI:     Kamila Chavez is a 70 y.o. female is here for overdue f/u. She has a PMHx of aortic stenosis, HTN, HLD and hypothyroidism. She reports shortness of breath on exertion. Some symptoms are improved with use of PRN inhalers. 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 She gets out of breath by the time she gets up the driveway, sometimes has to stop midway due to shortness of breath. She denies complaints of chest pain with exertion. She was recently taken off nephrotoxic medications due to worsening renal function. Was placed on amlodipine in place of lisinopril/HCTZ for BP control about 2 weeks ago. Has not been checking BP at home. Complaints of palpitation symptoms that she notices only in the evenings. PCP Provider  Tia Mcclain MD    Past Medical History:   Diagnosis Date    Arthritis     back,feet    Chronic pain     back,feet    GERD (gastroesophageal reflux disease)     Hypertension     Obesity     Other ill-defined conditions(799.89)     heart murmur     Stroke ([facility] [address]     mini    Thyroid disease     hypo    Unspecified adverse effect of anesthesia     slow to wake up        Allergies   Allergen Reactions    Other Food Nausea and Vomiting     Black eye peas    Statins-Hmg-Coa Reductase Inhibitors Other (comments)     Severe pain and weakness    Amoxicillin Hives    Colestid [Colestipol] Myalgia    Gabapentin Other (comments)     Edema, GI problem    Pcn [Penicillins] Hives    Pepper (Genus Capsicum) Nausea Only     Upset stomach    Pravastatin Myalgia     Severe pain and weakness with all statins        Outpatient Encounter Medications as of 10/2/2020   Medication Sig Dispense Refill    oxybutynin chloride XL (DITROPAN XL) 15 mg CR tablet Take 15 mg by mouth daily.  vit A/vit C/vit E/zinc/copper (PRESERVISION AREDS PO) Take  by mouth two (2) times a day.       metoprolol succinate (TOPROL-XL) 25 mg XL tablet Take 1 Tab by mouth daily. 30 Tab 1    acetaminophen (TYLENOL) 500 mg tablet Take 500 mg by mouth every six (6) hours as needed.  amLODIPine (NORVASC) 5 mg tablet Take 1 Tab by mouth daily. 90 Tab 3    DULoxetine (CYMBALTA) 20 mg capsule TAKE 1 CAPSULE BY MOUTH EVERY DAY      B.infantis-B.ani-B.long-B.bifi (Probiotic 4X) 10-15 mg TbEC Take  by mouth.  levothyroxine (SYNTHROID) 25 mcg tablet TAKE 1 TABLET BY MOUTH EVERY DAY BEFORE BREAKFAST 90 Tab 1    fluticasone propion-salmeteroL (ADVAIR DISKUS) 100-50 mcg/dose diskus inhaler Take 1 Puff by inhalation every twelve (12) hours. PRN      influenza vaccine 2017-18, 65 yrs+,,PF, (FLUZONE HIGH-DOSE 2017-18, PF,) syrg injection TO BE ADMINISTERED BY PHARMACIST FOR IMMUNIZATION      ANTIOX #11/OM3/DHA/EPA/LUT/YIN (OCUVITE ADULT 50+ PO) Take  by mouth daily.  Omeprazole delayed release (PRILOSEC D/R) 20 mg tablet Take 40 mg by mouth daily.  ascorbic acid-vitamin E-biotin (HAIR,SKIN & NAILS WITH BIOTIN) 7.5-7.5-1,250 mg-unit-mcg chew Take  by mouth.  OTHER Patient is taking Omega Red daily      FOLIC ACID/MULTIVITS-MIN (ONE DAILY GUMMY VITES PO) Take 2 Tabs by mouth two (2) times a day.  vitamin c-vitamin e (CRANBERRY CONCENTRATE) Cap Take 500 mg by mouth daily.  fluticasone (FLONASE) 50 mcg/Actuation nasal spray 2 Sprays by Nasal route nightly. EVERY OTHER NIGHT      fexofenadine (ALLEGRA) 180 mg tablet Take  by mouth every thirty (30) days.  vitamin e (E GEMS) 1,000 unit capsule Take 1,000 Units by mouth daily.  [DISCONTINUED] diclofenac (VOLTAREN) 1 % gel Apply 3-4 grams to the affected area 3 to 4 times to the area.  [DISCONTINUED] diclofenac (VOLTAREN) 1 % gel       [DISCONTINUED] lisinopril-hydroCHLOROthiazide (PRINZIDE, ZESTORETIC) 20-12.5 mg per tablet Take 1 Tab by mouth daily.       [DISCONTINUED] cranberry-vitamin c-vitamin e (Cranberry Concentrate) 140-100 mg cap Take 500 mg by mouth daily.  [DISCONTINUED] traMADoL (ULTRAM) 50 mg tablet TAKE 1 TAB BY MOUTH EVERY EIGHT (8) HOURS AS NEEDED FOR PAIN FOR UP TO 90 DAYS. MAX 4 TABS DAILY      [DISCONTINUED] darifenacin (ENABLEX) 15 mg ER tablet Take 1 Tab by mouth daily for 90 days. 90 Tab 3    [DISCONTINUED] meloxicam (Mobic) 15 mg tablet Take 1/2 to 1 tablet daily with food for pain / inflammation.  [DISCONTINUED] furosemide (LASIX) 20 mg tablet TAKE 1 TAB BY MOUTH DAILY AS NEEDED 30 Tab 0    [DISCONTINUED] ERIC ASPIRIN PO Take 325 mg by mouth daily. Extra strengh       [DISCONTINUED] IBUPROFEN (ADVIL LIQUI-GEL PO) Take 400 mg by mouth three (3) times daily. 2 tablets 3 times daily       No facility-administered encounter medications on file as of 10/2/2020. Review of Symptoms:    Review of Systems   Constitutional: Negative for chills, fever and weight loss. HENT: Negative for nosebleeds. Eyes: Negative for blurred vision and double vision. Respiratory: Positive for shortness of breath. Negative for cough and wheezing. Cardiovascular: Negative for chest pain, palpitations, orthopnea, leg swelling and PND. Gastrointestinal: Negative for abdominal pain, blood in stool, diarrhea, nausea and vomiting. Musculoskeletal: Negative for joint pain. Skin: Negative for rash. Neurological: Negative for dizziness, tingling and loss of consciousness. Endo/Heme/Allergies: Does not bruise/bleed easily. Physical Exam:      General: Well developed, in no acute distress, cooperative and alert  HEENT: No carotid bruits, no JVD, trach is midline. Neck Supple, PEERL, EOM intact. Heart:  reg rate and rhythm; normal S1/S2; no murmurs, no gallops or rubs. Respiratory: Clear bilaterally x 4, no wheezing or rales  Abdomen:   Soft, non-tender, no distention, no masses. + BS. Extremities:  Normal cap refill, no cyanosis, atraumatic. No edema. Neuro: A&Ox3, speech clear, gait stable.    Skin: Skin color is normal. No rashes or lesions. Non diaphoretic  Vascular: 2+ pulses symmetric in all extremities    Vitals:    10/02/20 0913 10/02/20 0931   BP: (!) 158/70 (!) 164/68   Pulse: 72    Resp: 16    SpO2: 97%    Weight: 175 lb (79.4 kg)    Height: 5' 3\" (1.6 m)        ECG: sinus rhythm    Cardiology Labs:    Lab Results   Component Value Date/Time    Cholesterol, total 236 (H) 09/02/2020 10:15 AM    HDL Cholesterol 58 09/02/2020 10:15 AM    LDL, calculated 164 (H) 02/24/2020 12:33 PM    LDL, calculated 143 (H) 08/22/2019 11:50 AM    LDL, calculated 182 (H) 11/12/2018 11:20 AM    LDL Chol Calc (NIH) 159 (H) 09/02/2020 10:15 AM    VLDL, calculated 26 02/24/2020 12:33 PM    VLDL Cholesterol Germán 19 09/02/2020 10:15 AM       No results found for: HBA1C, ZPV5MFWQ, NWK6JSQU, XYI6EPUY    Lab Results   Component Value Date/Time    Sodium 141 09/15/2020 11:40 AM    Potassium 5.4 (H) 09/15/2020 11:40 AM    Chloride 105 09/15/2020 11:40 AM    CO2 23 09/15/2020 11:40 AM    Glucose 96 09/15/2020 11:40 AM    BUN 28 (H) 09/15/2020 11:40 AM    Creatinine 1.30 (H) 09/15/2020 11:40 AM    BUN/Creatinine ratio 22 09/15/2020 11:40 AM    GFR est AA 47 (L) 09/15/2020 11:40 AM    GFR est non-AA 41 (L) 09/15/2020 11:40 AM    Calcium 9.7 09/15/2020 11:40 AM    Bilirubin, total 0.4 11/12/2018 11:20 AM    ALT (SGPT) 23 11/12/2018 11:20 AM    Alk. phosphatase 100 11/12/2018 11:20 AM    Protein, total 7.0 11/12/2018 11:20 AM    Albumin 4.4 11/12/2018 11:20 AM    A-G Ratio 1.7 11/12/2018 11:20 AM          Assessment:       ICD-10-CM ICD-9-CM    1. Nonrheumatic aortic valve stenosis  I35.0 424.1 ECHO ADULT COMPLETE   2. Essential hypertension, benign  I10 401.1    3. Pure hypercholesterolemia  E78.00 272.0 AMB POC EKG ROUTINE W/ 12 LEADS, INTER & REP   4. SOBOE (shortness of breath on exertion)  R06.02 786.05 NUCLEAR CARDIAC STRESS TEST      ECHO ADULT COMPLETE        Plan:     1.  Nonrheumatic aortic valve stenosis  Echo done 6/2017 with mild AS with preserved LVEF 60%  Repeat echo to assess progression    2. Essential hypertension, benign  Add Toprol for added benefit for palpitation symptoms  Continue amlodipine    3. Pure hypercholesterolemia   in 9/2020  Intolerant to statins  Consider Zetia    4. Shortness of breath on exertion  Check lexiscan stress test    F/u with Dr. Linda Walker in 1 month    Erich Engel NP       [facility]    10/2/2020         Patient seen, examined by me personally. Plan discussed as detailed. Agree with note as outlined by  NP. I confirm findings in history and physical exam. No additional findings noted. Agree with plan as outlined above.      Lalita Doan MD

## 2020-10-01 RX ORDER — LISINOPRIL AND HYDROCHLOROTHIAZIDE 12.5; 2 MG/1; MG/1
1 TABLET ORAL DAILY
COMMUNITY
Start: 2020-07-12 | End: 2020-10-02

## 2020-10-01 RX ORDER — TRAMADOL HYDROCHLORIDE 50 MG/1
TABLET ORAL
COMMUNITY
Start: 2020-02-29 | End: 2020-10-02

## 2020-10-01 RX ORDER — MULTIVIT WITH MINERALS/LUTEIN
500 TABLET ORAL DAILY
COMMUNITY
End: 2020-10-02 | Stop reason: SDUPTHER

## 2020-10-01 RX ORDER — ACETAMINOPHEN 500 MG
1000 TABLET ORAL 3 TIMES DAILY
COMMUNITY

## 2020-10-02 ENCOUNTER — OFFICE VISIT (OUTPATIENT)
Dept: CARDIOLOGY CLINIC | Age: 75
End: 2020-10-02
Payer: MEDICARE

## 2020-10-02 VITALS
SYSTOLIC BLOOD PRESSURE: 164 MMHG | RESPIRATION RATE: 16 BRPM | HEART RATE: 72 BPM | BODY MASS INDEX: 31.01 KG/M2 | WEIGHT: 175 LBS | HEIGHT: 63 IN | DIASTOLIC BLOOD PRESSURE: 68 MMHG | OXYGEN SATURATION: 97 %

## 2020-10-02 DIAGNOSIS — E78.00 PURE HYPERCHOLESTEROLEMIA: ICD-10-CM

## 2020-10-02 DIAGNOSIS — R06.02 SOBOE (SHORTNESS OF BREATH ON EXERTION): ICD-10-CM

## 2020-10-02 DIAGNOSIS — I35.0 NONRHEUMATIC AORTIC VALVE STENOSIS: ICD-10-CM

## 2020-10-02 DIAGNOSIS — I35.0 NONRHEUMATIC AORTIC VALVE STENOSIS: Primary | ICD-10-CM

## 2020-10-02 DIAGNOSIS — I10 ESSENTIAL HYPERTENSION, BENIGN: ICD-10-CM

## 2020-10-02 PROCEDURE — 1090F PRES/ABSN URINE INCON ASSESS: CPT | Performed by: INTERNAL MEDICINE

## 2020-10-02 PROCEDURE — G0463 HOSPITAL OUTPT CLINIC VISIT: HCPCS | Performed by: INTERNAL MEDICINE

## 2020-10-02 PROCEDURE — 3017F COLORECTAL CA SCREEN DOC REV: CPT | Performed by: INTERNAL MEDICINE

## 2020-10-02 PROCEDURE — 93010 ELECTROCARDIOGRAM REPORT: CPT | Performed by: INTERNAL MEDICINE

## 2020-10-02 PROCEDURE — G8753 SYS BP > OR = 140: HCPCS | Performed by: INTERNAL MEDICINE

## 2020-10-02 PROCEDURE — G8754 DIAS BP LESS 90: HCPCS | Performed by: INTERNAL MEDICINE

## 2020-10-02 PROCEDURE — 1101F PT FALLS ASSESS-DOCD LE1/YR: CPT | Performed by: INTERNAL MEDICINE

## 2020-10-02 PROCEDURE — G8432 DEP SCR NOT DOC, RNG: HCPCS | Performed by: INTERNAL MEDICINE

## 2020-10-02 PROCEDURE — G8536 NO DOC ELDER MAL SCRN: HCPCS | Performed by: INTERNAL MEDICINE

## 2020-10-02 PROCEDURE — 93005 ELECTROCARDIOGRAM TRACING: CPT | Performed by: INTERNAL MEDICINE

## 2020-10-02 PROCEDURE — G8427 DOCREV CUR MEDS BY ELIG CLIN: HCPCS | Performed by: INTERNAL MEDICINE

## 2020-10-02 PROCEDURE — 99214 OFFICE O/P EST MOD 30 MIN: CPT | Performed by: INTERNAL MEDICINE

## 2020-10-02 PROCEDURE — G8417 CALC BMI ABV UP PARAM F/U: HCPCS | Performed by: INTERNAL MEDICINE

## 2020-10-02 PROCEDURE — G8399 PT W/DXA RESULTS DOCUMENT: HCPCS | Performed by: INTERNAL MEDICINE

## 2020-10-02 RX ORDER — DICLOFENAC SODIUM 10 MG/G
GEL TOPICAL
COMMUNITY
Start: 2020-09-30 | End: 2020-10-02

## 2020-10-02 RX ORDER — OXYBUTYNIN CHLORIDE 15 MG/1
15 TABLET, EXTENDED RELEASE ORAL DAILY
COMMUNITY

## 2020-10-02 RX ORDER — METOPROLOL SUCCINATE 25 MG/1
25 TABLET, EXTENDED RELEASE ORAL DAILY
Qty: 30 TAB | Refills: 1 | Status: SHIPPED | OUTPATIENT
Start: 2020-10-02 | End: 2020-10-26

## 2020-10-02 NOTE — LETTER
10/2/20 Patient: Idalia Dasilva YOB: 1945 Date of Visit: 10/2/2020 Chano Reyes MD 
[address] [address] VIA In Basket Dear Chano Reyes MD, Thank you for referring Ms. Bryan Pierre to [address] [place], [place] [address]s[place] for evaluation. My notes for this consultation are attached. If you have questions, please do not hesitate to call me. I look forward to following your patient along with you. Sincerely, Georgette Woodruff MD

## 2020-10-02 NOTE — PROGRESS NOTES
1. Have you been to the ER, urgent care clinic since your last visit? Hospitalized since your last visit? No    2. Have you seen or consulted any other health care providers outside of the [address] [place] [address] since your last visit? Include any pap smears or colon screening.  No

## 2020-11-17 ENCOUNTER — ANCILLARY PROCEDURE (OUTPATIENT)
Dept: CARDIOLOGY CLINIC | Age: 75
End: 2020-11-17
Payer: MEDICARE

## 2020-11-17 VITALS
BODY MASS INDEX: 31.01 KG/M2 | HEIGHT: 63 IN | WEIGHT: 175 LBS | SYSTOLIC BLOOD PRESSURE: 164 MMHG | DIASTOLIC BLOOD PRESSURE: 68 MMHG

## 2020-11-17 VITALS
BODY MASS INDEX: 30.12 KG/M2 | SYSTOLIC BLOOD PRESSURE: 162 MMHG | DIASTOLIC BLOOD PRESSURE: 78 MMHG | WEIGHT: 170 LBS | HEIGHT: 63 IN

## 2020-11-17 LAB
STRESS BASELINE DIAS BP: 78 MMHG
STRESS BASELINE HR: 55 BPM
STRESS BASELINE SYS BP: 162 MMHG
STRESS PEAK DIAS BP: 78 MMHG
STRESS PEAK SYS BP: 162 MMHG
STRESS PERCENT HR ACHIEVED: 59 %
STRESS POST PEAK HR: 87 BPM
STRESS RATE PRESSURE PRODUCT: NORMAL BPM*MMHG
STRESS ST DEPRESSION: 0 MM
STRESS ST ELEVATION: 0 MM
STRESS TARGET HR: 147 BPM

## 2020-11-17 PROCEDURE — 78452 HT MUSCLE IMAGE SPECT MULT: CPT | Performed by: INTERNAL MEDICINE

## 2020-11-17 PROCEDURE — 93016 CV STRESS TEST SUPVJ ONLY: CPT | Performed by: INTERNAL MEDICINE

## 2020-11-17 PROCEDURE — 93018 CV STRESS TEST I&R ONLY: CPT | Performed by: INTERNAL MEDICINE

## 2020-11-17 PROCEDURE — 93306 TTE W/DOPPLER COMPLETE: CPT | Performed by: INTERNAL MEDICINE

## 2020-11-17 PROCEDURE — A9502 TC99M TETROFOSMIN: HCPCS | Performed by: INTERNAL MEDICINE

## 2020-11-17 RX ADMIN — TETROFOSMIN 34.8 MILLICURIE: 1.38 INJECTION, POWDER, LYOPHILIZED, FOR SOLUTION INTRAVENOUS at 10:43

## 2020-11-17 RX ADMIN — REGADENOSON 0.4 MG: 0.08 INJECTION, SOLUTION INTRAVENOUS at 10:43

## 2020-11-18 NOTE — PROGRESS NOTES
Spoke to patient using 2 identifiers. Per Dominique Powers NP, patient was made aware that stress test appears normal.  Low concern for significant blockages in the heart arteries at this time. Keep f/u with Dr. Nadia Herman as scheduled to discuss next steps. Patient verbalized understanding.

## 2020-11-18 NOTE — PROGRESS NOTES
Please call the patient and inform that stress test appears normal.  Low concern for significant blockages in the heart arteries at this time. Keep f/u with Dr. Jayne Lynn as scheduled to discuss next steps.     Thanks,  Reina Zuniga

## 2020-11-19 LAB
AV R PG: 85.06 MMHG
ECHO AO ASC DIAM: 3.19 CM
ECHO AO ROOT DIAM: 3 CM
ECHO AR MAX VEL PISA: 461.13 CM/S
ECHO AV AREA PEAK VELOCITY: 1.21 CM2
ECHO AV AREA VTI: 1.31 CM2
ECHO AV AREA/BSA PEAK VELOCITY: 0.7 CM2/M2
ECHO AV AREA/BSA VTI: 0.7 CM2/M2
ECHO AV MEAN GRADIENT: 15.38 MMHG
ECHO AV PEAK GRADIENT: 29.45 MMHG
ECHO AV PEAK VELOCITY: 271.34 CM/S
ECHO AV REGURGITANT PHT: 615.9 MS
ECHO AV VTI: 68.41 CM
ECHO EST RA PRESSURE: 8 MMHG
ECHO LA VOL 2C: 69.41 ML (ref 22–52)
ECHO LA VOL 4C: 87.25 ML (ref 22–52)
ECHO LA VOLUME INDEX A2C: 37.99 ML/M2 (ref 16–28)
ECHO LA VOLUME INDEX A4C: 47.75 ML/M2 (ref 16–28)
ECHO LV INTERNAL DIMENSION DIASTOLIC: 4.15 CM (ref 3.9–5.3)
ECHO LV INTERNAL DIMENSION SYSTOLIC: 2.6 CM
ECHO LV ISOVOLUMETRIC RELAXATION TIME (IVRT): 59.15 MS
ECHO LV IVSD: 1.19 CM (ref 0.6–0.9)
ECHO LV MASS 2D: 172.8 G (ref 67–162)
ECHO LV MASS INDEX 2D: 94.6 G/M2 (ref 43–95)
ECHO LV POSTERIOR WALL DIASTOLIC: 1.19 CM (ref 0.6–0.9)
ECHO LVOT DIAM: 2.04 CM
ECHO LVOT PEAK GRADIENT: 4.01 MMHG
ECHO LVOT PEAK VELOCITY: 100.11 CM/S
ECHO LVOT SV: 89.4 ML
ECHO LVOT VTI: 27.27 CM
ECHO MV "A" WAVE DURATION: 129.39 MS
ECHO MV A VELOCITY: 154.79 CENTIMETER/SECOND
ECHO MV E DECELERATION TIME (DT): 310.39 MS
ECHO MV E VELOCITY: 114.26 CENTIMETER/SECOND
ECHO PVEIN A DURATION: 199.63 MS
ECHO PVEIN A VELOCITY: 40.17 CM/S
ECHO RIGHT VENTRICULAR SYSTOLIC PRESSURE (RVSP): 46.63 MMHG
ECHO TV REGURGITANT MAX VELOCITY: 310.77 CM/S
ECHO TV REGURGITANT PEAK GRADIENT: 38.63 MMHG
LVOT MG: 2.32 MMHG

## 2020-11-20 NOTE — PROGRESS NOTES
Amena,    Please call the patient and inform that echocardiogram is normal, ejection fraction 55-60%. No concern for heart failure at this time. Her aortic valve still is mildly narrowed, has not gotten worse from last check. No changes. She was supposed to come back to see us 1 month from last visit, so please make sure she has a f/u appt with us.     Thanks,  [employer]

## 2020-11-23 ENCOUNTER — DOCUMENTATION ONLY (OUTPATIENT)
Dept: INTERNAL MEDICINE CLINIC | Age: 75
End: 2020-11-23

## 2020-11-23 NOTE — PROGRESS NOTES
Spoke to patient using 2 identifiers. Per Yolie Vela NP, patient was made aware that echocardiogram is normal, ejection fraction 55-60%. No concern for heart failure at this time. Marco Patterson aortic valve still is mildly narrowed, has not gotten worse from last check. No changes.  next appt is on 12/3/20. Patient verbalized understanding.

## 2020-12-01 NOTE — PROGRESS NOTES
Subjective/HPI:     Vishnu Salas is a 70 y.o. female is here for result f/u. She has a PMHx of aortic stenosis, HTN, HLD and hypothyroidism. At last visit, we obtained stress test, echocardiogram for symptoms of SOBOE. Also added Toprol for management of HTN and palpitation symptoms. Her stress test was normal. Echo showed normal EF, mild AS. C/o edema today. BP, palpitations better with addition of toprol. PCP Provider  Rere Dee MD    Past Medical History:   Diagnosis Date    Arthritis     back,feet    Chronic pain     back,feet    GERD (gastroesophageal reflux disease)     Hypertension     Obesity     Other ill-defined conditions(799.89)     heart murmur     Stroke ([facility] [address]     mini    Thyroid disease     hypo    Unspecified adverse effect of anesthesia     slow to wake up        Allergies   Allergen Reactions    Other Food Nausea and Vomiting     Black eye peas    Statins-Hmg-Coa Reductase Inhibitors Other (comments)     Severe pain and weakness    Amoxicillin Hives    Colestid [Colestipol] Myalgia    Gabapentin Other (comments)     Edema, GI problem    Pcn [Penicillins] Hives    Pepper (Genus Capsicum) Nausea Only     Upset stomach    Pravastatin Myalgia     Severe pain and weakness with all statins        Outpatient Encounter Medications as of 12/3/2020   Medication Sig Dispense Refill    DULoxetine (CYMBALTA) 30 mg capsule TAKE 1 CAPSULE BY MOUTH EVERY DAY      cyanocobalamin 1,000 mcg tablet Take 1,000 mcg by mouth daily.  furosemide (LASIX) 20 mg tablet Take 1 Tab by mouth daily. 30 Tab 1    metoprolol succinate (TOPROL-XL) 25 mg XL tablet TAKE 1 TABLET BY MOUTH EVERY DAY 90 Tab 3    vit A/vit C/vit E/zinc/copper (PRESERVISION AREDS PO) Take  by mouth two (2) times a day.  acetaminophen (TYLENOL) 500 mg tablet Take 500 mg by mouth every six (6) hours as needed.  amLODIPine (NORVASC) 5 mg tablet Take 1 Tab by mouth daily.  90 Tab 3    B.infantis-B.ani-B.long-B.bifi (Probiotic 4X) 10-15 mg TbEC Take  by mouth.  levothyroxine (SYNTHROID) 25 mcg tablet TAKE 1 TABLET BY MOUTH EVERY DAY BEFORE BREAKFAST (Patient taking differently: Takes at HS) 90 Tab 1    fluticasone propion-salmeteroL (ADVAIR DISKUS) 100-50 mcg/dose diskus inhaler Take 1 Puff by inhalation every twelve (12) hours. PRN      ascorbic acid-vitamin E-biotin (HAIR,SKIN & NAILS WITH BIOTIN) 7.5-7.5-1,250 mg-unit-mcg chew Take  by mouth.  OTHER Patient is taking Omega Red daily      FOLIC ACID/MULTIVITS-MIN (ONE DAILY GUMMY VITES PO) Take 2 Tabs by mouth two (2) times a day.  vitamin c-vitamin e (CRANBERRY CONCENTRATE) Cap Take 500 mg by mouth daily. Two tabs daily      fluticasone (FLONASE) 50 mcg/Actuation nasal spray 2 Sprays by Nasal route nightly. EVERY OTHER NIGHT      fexofenadine (ALLEGRA) 180 mg tablet Take  by mouth every thirty (30) days.  vitamin e (E GEMS) 1,000 unit capsule Take 1,000 Units by mouth daily.  oxybutynin chloride XL (DITROPAN XL) 15 mg CR tablet Take 15 mg by mouth daily.  DULoxetine (CYMBALTA) 20 mg capsule TAKE 1 CAPSULE BY MOUTH EVERY DAY      influenza vaccine 2017-18, 65 yrs+,,PF, (FLUZONE HIGH-DOSE 2017-18, PF,) syrg injection TO BE ADMINISTERED BY PHARMACIST FOR IMMUNIZATION      ANTIOX #11/OM3/DHA/EPA/LUT/YIN (OCUVITE ADULT 50+ PO) Take  by mouth daily.  Omeprazole delayed release (PRILOSEC D/R) 20 mg tablet Take 40 mg by mouth daily. No facility-administered encounter medications on file as of 12/3/2020. Review of Symptoms:    Review of Systems   Constitutional: Negative for chills, fever and weight loss. HENT: Negative for nosebleeds. Eyes: Negative for blurred vision and double vision. Respiratory: Positive for shortness of breath. Negative for cough and wheezing. Cardiovascular: Negative for chest pain, palpitations, orthopnea, leg swelling and PND.    Gastrointestinal: Negative for abdominal pain, blood in stool, diarrhea, nausea and vomiting. Musculoskeletal: Negative for joint pain. Skin: Negative for rash. Neurological: Negative for dizziness, tingling and loss of consciousness. Endo/Heme/Allergies: Does not bruise/bleed easily. Physical Exam:      General: Well developed, in no acute distress, cooperative and alert  HEENT: No carotid bruits, no JVD, trach is midline. Neck Supple, PEERL, EOM intact. Heart:  reg rate and rhythm; normal S1/S2; no murmurs, no gallops or rubs. Respiratory: Clear bilaterally x 4, no wheezing or rales  Abdomen:   Soft, non-tender, no distention, no masses. + BS. Extremities:  Normal cap refill, no cyanosis, atraumatic. No edema. Neuro: A&Ox3, speech clear, gait stable. Skin: Skin color is normal. No rashes or lesions.  Non diaphoretic  Vascular: 2+ pulses symmetric in all extremities    Vitals:    12/03/20 1440 12/03/20 1443   BP: (!) 144/70 (!) 142/76   Pulse: 70    Resp: 18    SpO2: 96%    Weight: 175 lb (79.4 kg)    Height: 5' 3\" (1.6 m)          Cardiology Labs:    Lab Results   Component Value Date/Time    Cholesterol, total 236 (H) 09/02/2020 10:15 AM    HDL Cholesterol 58 09/02/2020 10:15 AM    LDL, calculated 164 (H) 02/24/2020 12:33 PM    LDL, calculated 143 (H) 08/22/2019 11:50 AM    LDL, calculated 182 (H) 11/12/2018 11:20 AM    LDL Chol Calc ([facility]) 159 (H) 09/02/2020 10:15 AM    VLDL, calculated 26 02/24/2020 12:33 PM    VLDL Cholesterol Germán 19 09/02/2020 10:15 AM       No results found for: HBA1C, ADS4PYYT, LBU9QEGE, JSZ0PMFF    Lab Results   Component Value Date/Time    Sodium 141 09/15/2020 11:40 AM    Potassium 5.4 (H) 09/15/2020 11:40 AM    Chloride 105 09/15/2020 11:40 AM    CO2 23 09/15/2020 11:40 AM    Glucose 96 09/15/2020 11:40 AM    BUN 28 (H) 09/15/2020 11:40 AM    Creatinine 1.30 (H) 09/15/2020 11:40 AM    BUN/Creatinine ratio 22 09/15/2020 11:40 AM    GFR est AA 47 (L) 09/15/2020 11:40 AM    GFR est non-AA 41 (L) 09/15/2020 11:40 AM    Calcium 9.7 09/15/2020 11:40 AM    Bilirubin, total 0.4 11/12/2018 11:20 AM    ALT (SGPT) 23 11/12/2018 11:20 AM    Alk. phosphatase 100 11/12/2018 11:20 AM    Protein, total 7.0 11/12/2018 11:20 AM    Albumin 4.4 11/12/2018 11:20 AM    A-G Ratio 1.7 11/12/2018 11:20 AM          Assessment:       ICD-10-CM ICD-9-CM    1. Nonrheumatic aortic valve stenosis  I35.0 424.1    2. Essential hypertension, benign  I10 401.1    3. Pure hypercholesterolemia  E78.00 272.0    4. SOBOE (shortness of breath on exertion)  R06.02 786.05         Plan:     1. Nonrheumatic aortic valve stenosis  Echo done 11/2010 with preserved LVEF 55-60% with grade 1 DD, mild AS with mild AI and mild MR  Continue present management    2. Essential hypertension, benign  Continue amlodipine, Toprol    3. Pure hypercholesterolemia   in 9/2020  Intolerant to statins  Consider Zetia    4. Shortness of breath on exertion  Lexiscan stress test done 11/2020 with fixed defect, likely related to artifact  Echo done with preserved LVEF 55-60%, mild AS' has trace edema. She wants to try lasix which has helped her in the past.     F/u with me in 6 months. Has f/u with nephrology.     Ofelia Schmidt MD

## 2020-12-03 ENCOUNTER — OFFICE VISIT (OUTPATIENT)
Dept: CARDIOLOGY CLINIC | Age: 75
End: 2020-12-03
Payer: MEDICARE

## 2020-12-03 VITALS
DIASTOLIC BLOOD PRESSURE: 76 MMHG | BODY MASS INDEX: 31.01 KG/M2 | WEIGHT: 175 LBS | OXYGEN SATURATION: 96 % | HEART RATE: 70 BPM | SYSTOLIC BLOOD PRESSURE: 142 MMHG | HEIGHT: 63 IN | RESPIRATION RATE: 18 BRPM

## 2020-12-03 DIAGNOSIS — I35.0 NONRHEUMATIC AORTIC VALVE STENOSIS: Primary | ICD-10-CM

## 2020-12-03 DIAGNOSIS — R06.02 SOBOE (SHORTNESS OF BREATH ON EXERTION): ICD-10-CM

## 2020-12-03 DIAGNOSIS — E78.00 PURE HYPERCHOLESTEROLEMIA: ICD-10-CM

## 2020-12-03 DIAGNOSIS — I10 ESSENTIAL HYPERTENSION, BENIGN: ICD-10-CM

## 2020-12-03 PROCEDURE — 1090F PRES/ABSN URINE INCON ASSESS: CPT | Performed by: INTERNAL MEDICINE

## 2020-12-03 PROCEDURE — G8399 PT W/DXA RESULTS DOCUMENT: HCPCS | Performed by: INTERNAL MEDICINE

## 2020-12-03 PROCEDURE — G8417 CALC BMI ABV UP PARAM F/U: HCPCS | Performed by: INTERNAL MEDICINE

## 2020-12-03 PROCEDURE — G0463 HOSPITAL OUTPT CLINIC VISIT: HCPCS | Performed by: INTERNAL MEDICINE

## 2020-12-03 PROCEDURE — G8427 DOCREV CUR MEDS BY ELIG CLIN: HCPCS | Performed by: INTERNAL MEDICINE

## 2020-12-03 PROCEDURE — 99214 OFFICE O/P EST MOD 30 MIN: CPT | Performed by: INTERNAL MEDICINE

## 2020-12-03 PROCEDURE — G8753 SYS BP > OR = 140: HCPCS | Performed by: INTERNAL MEDICINE

## 2020-12-03 PROCEDURE — G8432 DEP SCR NOT DOC, RNG: HCPCS | Performed by: INTERNAL MEDICINE

## 2020-12-03 PROCEDURE — 1101F PT FALLS ASSESS-DOCD LE1/YR: CPT | Performed by: INTERNAL MEDICINE

## 2020-12-03 PROCEDURE — G8536 NO DOC ELDER MAL SCRN: HCPCS | Performed by: INTERNAL MEDICINE

## 2020-12-03 PROCEDURE — G8754 DIAS BP LESS 90: HCPCS | Performed by: INTERNAL MEDICINE

## 2020-12-03 PROCEDURE — 3017F COLORECTAL CA SCREEN DOC REV: CPT | Performed by: INTERNAL MEDICINE

## 2020-12-03 RX ORDER — FUROSEMIDE 20 MG/1
20 TABLET ORAL DAILY
Qty: 30 TAB | Refills: 1 | Status: SHIPPED | OUTPATIENT
Start: 2020-12-03 | End: 2021-01-07

## 2020-12-03 RX ORDER — LANOLIN ALCOHOL/MO/W.PET/CERES
1000 CREAM (GRAM) TOPICAL DAILY
COMMUNITY
End: 2021-11-16

## 2020-12-03 RX ORDER — DULOXETIN HYDROCHLORIDE 30 MG/1
30 CAPSULE, DELAYED RELEASE ORAL DAILY
Status: ON HOLD | COMMUNITY
Start: 2020-11-11 | End: 2021-12-17 | Stop reason: CLARIF

## 2020-12-03 NOTE — LETTER
12/3/20 Patient: Leonid Chun YOB: 1945 Date of Visit: 12/3/2020 Cliff Sainz MD 
[address] [address] VIA In Basket Dear Cliff Sainz MD, Thank you for referring Ms. Emile De Anda to [address] [place] [address] for evaluation. My notes for this consultation are attached. If you have questions, please do not hesitate to call me. I look forward to following your patient along with you. Sincerely, Devin Tunrer MD

## 2020-12-03 NOTE — PROGRESS NOTES
Chief Complaint   Patient presents with    Results     Patient here to discuss results of NST and Echo-    Palpitations     at times     Shortness of Breath     when walking on an incline    Leg Swelling     pascale lower legs and feet      1. Have you been to the ER, urgent care clinic since your last visit? Hospitalized since your last visit? No     2. Have you seen or consulted any other health care providers outside of the [address] [place] [address] since your last visit? Include any pap smears or colon screening.   Yes Dr Augusta Stokes - Nephrologist

## 2021-02-08 RX ORDER — LEVOTHYROXINE SODIUM 25 UG/1
TABLET ORAL
Qty: 90 TAB | Refills: 1 | Status: SHIPPED | OUTPATIENT
Start: 2021-02-08 | End: 2021-08-19

## 2021-02-25 ENCOUNTER — DOCUMENTATION ONLY (OUTPATIENT)
Dept: INTERNAL MEDICINE CLINIC | Age: 76
End: 2021-02-25

## 2021-03-16 ENCOUNTER — TELEPHONE (OUTPATIENT)
Dept: INTERNAL MEDICINE CLINIC | Age: 76
End: 2021-03-16

## 2021-03-16 NOTE — TELEPHONE ENCOUNTER
Left message on patient 's voice mail . Dr. Katherine Graf is willing to do VV today 3/16/21 @ 3:45 pm if patient is willing. Patient was instructed to call us back if she wants this appointment. Johnson Tran

## 2021-03-16 NOTE — TELEPHONE ENCOUNTER
Patient was unable to keep her VV with Dr. Gorge Machado today @ 11:45am. Patient is requesting to be rescheduled today or another day after 2:00pm. Patient was informed that 's schedule for today is already booked but we will schedule her another day.

## 2021-04-17 ENCOUNTER — TELEPHONE (OUTPATIENT)
Dept: CARDIOLOGY CLINIC | Age: 76
End: 2021-04-17

## 2021-04-17 NOTE — TELEPHONE ENCOUNTER
Left message on home phone appointment on Jayla 3,2021 with Dr Stacie Ortiz cancelled    [address] 6/3/21     MOVE to   7/22/21 at 3:00Pm    Ask patient to call to confirm     Sudheer Leon

## 2021-07-08 NOTE — TELEPHONE ENCOUNTER
Called, spoke to pt. Two identifiers confirmed. Notified pt RX was sent to pharmacy 9/17 and recommended pt check with pharmacy regarding shingrix d/t medicare. Pt verbalized understanding of information discussed w/ no further questions at this time.
Pt wants to know if we have both shingles vaccines? Please put the answer on the script envelope when you put this up front or call to let pt know when script is ready.
Normal

## 2021-07-22 ENCOUNTER — OFFICE VISIT (OUTPATIENT)
Dept: CARDIOLOGY CLINIC | Age: 76
End: 2021-07-22
Payer: MEDICARE

## 2021-07-22 VITALS
SYSTOLIC BLOOD PRESSURE: 132 MMHG | OXYGEN SATURATION: 98 % | DIASTOLIC BLOOD PRESSURE: 70 MMHG | WEIGHT: 184.6 LBS | HEIGHT: 63 IN | RESPIRATION RATE: 18 BRPM | BODY MASS INDEX: 32.71 KG/M2 | HEART RATE: 56 BPM

## 2021-07-22 DIAGNOSIS — I10 ESSENTIAL HYPERTENSION, BENIGN: ICD-10-CM

## 2021-07-22 DIAGNOSIS — I35.0 NONRHEUMATIC AORTIC VALVE STENOSIS: Primary | ICD-10-CM

## 2021-07-22 DIAGNOSIS — E78.00 PURE HYPERCHOLESTEROLEMIA: ICD-10-CM

## 2021-07-22 PROCEDURE — G8754 DIAS BP LESS 90: HCPCS | Performed by: NURSE PRACTITIONER

## 2021-07-22 PROCEDURE — 93010 ELECTROCARDIOGRAM REPORT: CPT | Performed by: NURSE PRACTITIONER

## 2021-07-22 PROCEDURE — 1101F PT FALLS ASSESS-DOCD LE1/YR: CPT | Performed by: NURSE PRACTITIONER

## 2021-07-22 PROCEDURE — G8399 PT W/DXA RESULTS DOCUMENT: HCPCS | Performed by: NURSE PRACTITIONER

## 2021-07-22 PROCEDURE — G8432 DEP SCR NOT DOC, RNG: HCPCS | Performed by: NURSE PRACTITIONER

## 2021-07-22 PROCEDURE — G8536 NO DOC ELDER MAL SCRN: HCPCS | Performed by: NURSE PRACTITIONER

## 2021-07-22 PROCEDURE — 99214 OFFICE O/P EST MOD 30 MIN: CPT | Performed by: NURSE PRACTITIONER

## 2021-07-22 PROCEDURE — G8752 SYS BP LESS 140: HCPCS | Performed by: NURSE PRACTITIONER

## 2021-07-22 PROCEDURE — G0463 HOSPITAL OUTPT CLINIC VISIT: HCPCS | Performed by: NURSE PRACTITIONER

## 2021-07-22 PROCEDURE — 1090F PRES/ABSN URINE INCON ASSESS: CPT | Performed by: NURSE PRACTITIONER

## 2021-07-22 PROCEDURE — G8417 CALC BMI ABV UP PARAM F/U: HCPCS | Performed by: NURSE PRACTITIONER

## 2021-07-22 PROCEDURE — 3017F COLORECTAL CA SCREEN DOC REV: CPT | Performed by: NURSE PRACTITIONER

## 2021-07-22 PROCEDURE — G8427 DOCREV CUR MEDS BY ELIG CLIN: HCPCS | Performed by: NURSE PRACTITIONER

## 2021-07-22 PROCEDURE — 93005 ELECTROCARDIOGRAM TRACING: CPT | Performed by: NURSE PRACTITIONER

## 2021-07-22 RX ORDER — DOCUSATE SODIUM 100 MG/1
100 CAPSULE, LIQUID FILLED ORAL DAILY
COMMUNITY
End: 2021-12-08

## 2021-07-22 RX ORDER — DICLOFENAC SODIUM 10 MG/G
2 GEL TOPICAL 4 TIMES DAILY
COMMUNITY
End: 2021-11-16

## 2021-07-22 RX ORDER — BISMUTH SUBSALICYLATE 262 MG
1 TABLET,CHEWABLE ORAL DAILY
COMMUNITY
End: 2021-12-08

## 2021-07-22 NOTE — PROGRESS NOTES
1. Have you been to the ER, urgent care clinic since your last visit? Hospitalized since your last visit? No    2. Have you seen or consulted any other health care providers outside of the [address] [place] [address] since your last visit? Include any pap smears or colon screening. No    Chief Complaint   Patient presents with    Valvular Heart Disease     6 mo appt. Discuss heart murmurs.

## 2021-07-22 NOTE — PROGRESS NOTES
Zhang Downey, [facility]    Subjective/HPI:     Beulah Hassan is a 70 y.o. female is here for routine f/u. She has a PMHx of aortic stenosis, HTN, HLD and hypothyroidism. Doing well. Denies complaints of chest pains, dizziness, orthopnea, PND or shortness of breath. Has minimal LE edema which is controlled with lasix. Helps to take care of her 's 80-year old uncle, who has IBS. Would like to do yard work but is not sure if this is safe because she has been told she has heart murmurs. Wants to know if this is a result of her being a premature baby. Current Outpatient Medications on File Prior to Visit   Medication Sig Dispense Refill    diclofenac (Voltaren) 1 % gel Apply 2 g to affected area four (4) times daily.  multivitamin (ONE A DAY) tablet Take 1 Tablet by mouth daily.  docusate sodium (Stool Softener) 100 mg capsule Take 100 mg by mouth daily.  furosemide (LASIX) 20 mg tablet TAKE 1 TABLET BY MOUTH EVERY DAY (Patient taking differently: Take 20 mg by mouth every seven (7) days.) 90 Tablet 3    aspirin delayed-release 81 mg tablet Take  by mouth daily.  OTHER prevagen 50mg 1 qd      levothyroxine (SYNTHROID) 25 mcg tablet TAKE 1 TABLET BY MOUTH EVERY DAY BEFORE BREAKFAST 90 Tab 1    DULoxetine (CYMBALTA) 30 mg capsule TAKE 1 CAPSULE BY MOUTH EVERY DAY      cyanocobalamin 1,000 mcg tablet Take 1,000 mcg by mouth daily.  metoprolol succinate (TOPROL-XL) 25 mg XL tablet TAKE 1 TABLET BY MOUTH EVERY DAY 90 Tab 3    oxybutynin chloride XL (DITROPAN XL) 15 mg CR tablet Take 15 mg by mouth daily.  vit A/vit C/vit E/zinc/copper (PRESERVISION AREDS PO) Take  by mouth two (2) times a day.  acetaminophen (TYLENOL) 500 mg tablet Take 500 mg by mouth every six (6) hours as needed.  amLODIPine (NORVASC) 5 mg tablet Take 1 Tab by mouth daily. 90 Tab 3    B.infantis-B.ani-B.long-B.bifi (Probiotic 4X) 10-15 mg TbEC Take  by mouth daily.  fluticasone propion-salmeteroL (ADVAIR DISKUS) 100-50 mcg/dose diskus inhaler Take 1 Puff by inhalation every twelve (12) hours. PRN      influenza vaccine 2017-18, 65 yrs+,,PF, (FLUZONE HIGH-DOSE 2017-18, PF,) syrg injection TO BE ADMINISTERED BY PHARMACIST FOR IMMUNIZATION      ascorbic acid-vitamin E-biotin (HAIR,SKIN & NAILS WITH BIOTIN) 7.5-7.5-1,250 mg-unit-mcg chew Take  by mouth.  OTHER Patient is taking Omega Red daily      FOLIC ACID/MULTIVITS-MIN (ONE DAILY GUMMY VITES PO) Take 2 Tabs by mouth two (2) times a day.  vitamin c-vitamin e (CRANBERRY CONCENTRATE) Cap Take 500 mg by mouth daily. Two tabs daily      fluticasone (FLONASE) 50 mcg/Actuation nasal spray 2 Sprays by Nasal route nightly. EVERY OTHER NIGHT      fexofenadine (ALLEGRA) 180 mg tablet Take  by mouth every thirty (30) days.  vitamin e (E GEMS) 1,000 unit capsule Take 1,000 Units by mouth daily.  [DISCONTINUED] DULoxetine (CYMBALTA) 20 mg capsule TAKE 1 CAPSULE BY MOUTH EVERY DAY (Patient not taking: No sig reported)      [DISCONTINUED] Omeprazole delayed release (PRILOSEC D/R) 20 mg tablet Take 40 mg by mouth daily. (Patient not taking: Reported on 7/22/2021)       No current facility-administered medications on file prior to visit. Review of Symptoms:    Review of Systems   Constitutional: Negative for chills, fever and weight loss. HENT: Negative for nosebleeds. Eyes: Negative for blurred vision and double vision. Respiratory: Negative for cough, shortness of breath and wheezing. Cardiovascular: Negative for chest pain, palpitations, orthopnea, leg swelling and PND. Skin: Negative for rash. Neurological: Negative for dizziness and loss of consciousness. Physical Exam:      General: Well developed, in no acute distress, cooperative and alert  Heart:  reg rate and rhythm; normal S1/S2; no murmurs, no gallops or rubs.    Respiratory: Clear bilaterally x 4, no wheezing or rales  Extremities:  Normal cap refill, no cyanosis, atraumatic. No edema. Vascular: 2+ pulses symmetric in all extremities    Vitals:    07/22/21 1257   BP: 132/70   BP 1 Location: Right arm   BP Patient Position: Sitting   BP Cuff Size: Large adult   Pulse: (!) 56   Resp: 18   Height: 5' 3\" (1.6 m)   Weight: 184 lb 9.6 oz (83.7 kg)   SpO2: 98%       ECG done today shows sinus rhythm     Assessment:       ICD-10-CM ICD-9-CM    1. Nonrheumatic aortic valve stenosis  I35.0 424.1    2. Essential hypertension, benign  I10 401.1 AMB POC EKG ROUTINE W/ 12 LEADS, INTER & REP   3. Pure hypercholesterolemia  E78.00 272.0         Plan:     1. Nonrheumatic aortic valve stenosis  Echo done 11/2020 with preserved LVEF 55-60% with grade 1 DD, mild AS with mild AI and mild MR  Continue present management  Repeat echo in 11/2022     2. Essential hypertension, benign  BP controlled.   Continue anti-hypertensive therapy and low sodium diet     3. Pure hypercholesterolemia   in 9/2020  Intolerant to statins; not interested in alternatives    F/u with Dr. Kendrick Lofton in 1 year    Jose Jonas NP

## 2021-08-19 RX ORDER — LEVOTHYROXINE SODIUM 25 UG/1
TABLET ORAL
Qty: 90 TABLET | Refills: 1 | Status: SHIPPED | OUTPATIENT
Start: 2021-08-19 | End: 2022-02-25

## 2021-08-19 RX ORDER — METOPROLOL SUCCINATE 25 MG/1
TABLET, EXTENDED RELEASE ORAL
Qty: 90 TABLET | Refills: 3 | Status: SHIPPED | OUTPATIENT
Start: 2021-08-19

## 2021-10-31 RX ORDER — AMLODIPINE BESYLATE 5 MG/1
TABLET ORAL
Qty: 90 TABLET | Refills: 3 | Status: SHIPPED | OUTPATIENT
Start: 2021-10-31 | End: 2022-11-01 | Stop reason: SDUPTHER

## 2021-11-16 ENCOUNTER — OFFICE VISIT (OUTPATIENT)
Dept: CARDIOLOGY CLINIC | Age: 76
End: 2021-11-16
Payer: MEDICARE

## 2021-11-16 VITALS
HEART RATE: 60 BPM | RESPIRATION RATE: 16 BRPM | HEIGHT: 63 IN | DIASTOLIC BLOOD PRESSURE: 78 MMHG | SYSTOLIC BLOOD PRESSURE: 130 MMHG | WEIGHT: 193.9 LBS | BODY MASS INDEX: 34.36 KG/M2 | OXYGEN SATURATION: 98 %

## 2021-11-16 DIAGNOSIS — I35.0 NONRHEUMATIC AORTIC VALVE STENOSIS: ICD-10-CM

## 2021-11-16 DIAGNOSIS — Z01.818 PRE-OPERATIVE CLEARANCE: Primary | ICD-10-CM

## 2021-11-16 DIAGNOSIS — E78.00 PURE HYPERCHOLESTEROLEMIA: ICD-10-CM

## 2021-11-16 DIAGNOSIS — I10 ESSENTIAL HYPERTENSION, BENIGN: ICD-10-CM

## 2021-11-16 PROCEDURE — 3017F COLORECTAL CA SCREEN DOC REV: CPT | Performed by: INTERNAL MEDICINE

## 2021-11-16 PROCEDURE — 1090F PRES/ABSN URINE INCON ASSESS: CPT | Performed by: INTERNAL MEDICINE

## 2021-11-16 PROCEDURE — 93005 ELECTROCARDIOGRAM TRACING: CPT | Performed by: INTERNAL MEDICINE

## 2021-11-16 PROCEDURE — 99213 OFFICE O/P EST LOW 20 MIN: CPT | Performed by: INTERNAL MEDICINE

## 2021-11-16 PROCEDURE — G8399 PT W/DXA RESULTS DOCUMENT: HCPCS | Performed by: INTERNAL MEDICINE

## 2021-11-16 PROCEDURE — G8427 DOCREV CUR MEDS BY ELIG CLIN: HCPCS | Performed by: INTERNAL MEDICINE

## 2021-11-16 PROCEDURE — 93010 ELECTROCARDIOGRAM REPORT: CPT | Performed by: INTERNAL MEDICINE

## 2021-11-16 PROCEDURE — G8536 NO DOC ELDER MAL SCRN: HCPCS | Performed by: INTERNAL MEDICINE

## 2021-11-16 PROCEDURE — G8510 SCR DEP NEG, NO PLAN REQD: HCPCS | Performed by: INTERNAL MEDICINE

## 2021-11-16 PROCEDURE — G8754 DIAS BP LESS 90: HCPCS | Performed by: INTERNAL MEDICINE

## 2021-11-16 PROCEDURE — 1101F PT FALLS ASSESS-DOCD LE1/YR: CPT | Performed by: INTERNAL MEDICINE

## 2021-11-16 PROCEDURE — G8752 SYS BP LESS 140: HCPCS | Performed by: INTERNAL MEDICINE

## 2021-11-16 PROCEDURE — G0463 HOSPITAL OUTPT CLINIC VISIT: HCPCS | Performed by: INTERNAL MEDICINE

## 2021-11-16 PROCEDURE — G8417 CALC BMI ABV UP PARAM F/U: HCPCS | Performed by: INTERNAL MEDICINE

## 2021-11-16 RX ORDER — MULTIVIT WITH MINERALS/HERBS
1 TABLET ORAL EVERY OTHER DAY
COMMUNITY

## 2021-11-16 RX ORDER — DULOXETIN HYDROCHLORIDE 20 MG/1
20 CAPSULE, DELAYED RELEASE ORAL
COMMUNITY

## 2021-11-16 RX ORDER — FAMOTIDINE 20 MG/1
20 TABLET, FILM COATED ORAL
COMMUNITY

## 2021-11-16 NOTE — LETTER
11/16/2021    Patient: Aura Aragon   YOB: 1945   Date of Visit: 11/16/2021     Jessenia Abreu MD  [address]  [facility]  [address] [employer]    Dear Jessenia Abreu MD,      Thank you for referring Ms. Milind Grant to [address] [place], [place] [address] for evaluation. My notes for this consultation are attached. If you have questions, please do not hesitate to call me. I look forward to following your patient along with you.       Sincerely,    Teresita Luz MD

## 2021-11-16 NOTE — PROGRESS NOTES
Chief Complaint   Patient presents with    Surgical Clearance     Dr. Alysia Smith Knee Arthroplasty 12/17/2021     Visit Vitals  /78 (BP 1 Location: Left arm, BP Patient Position: Sitting, BP Cuff Size: Adult)   Pulse 60   Resp 16   Ht 5' 3\" (1.6 m)   Wt 193 lb 14.4 oz (88 kg)   SpO2 98%   BMI 34.35 kg/m²       1. Have you been to the ER, urgent care clinic since your last visit? Hospitalized since your last visit? No    2. Have you seen or consulted any other health care providers outside of the [address] [place] [address] since your last visit? Include any pap smears or colon screening.  No

## 2021-11-16 NOTE — PROGRESS NOTES
Arcenio Anthony, FNP-BC    Subjective/HPI:     Kandy Acosta is a 78 y.o. female is here for routine f/u. She has a PMHx of aortic stenosis, HTN, HLD and hypothyroidism. Here for cardiac clearance for right knee replacement surgery in December. No new symptoms since last visit. Feels like the COVID vaccine has given her brain fog. Current Outpatient Medications on File Prior to Visit   Medication Sig Dispense Refill    DULoxetine (CYMBALTA) 20 mg capsule Take 20 mg by mouth nightly.  b complex vitamins tablet Take 1 Tablet by mouth daily.  famotidine (Pepcid) 20 mg tablet Take 20 mg by mouth daily as needed for Heartburn.  amLODIPine (NORVASC) 5 mg tablet TAKE 1 TABLET BY MOUTH EVERY DAY 90 Tablet 3    metoprolol succinate (TOPROL-XL) 25 mg XL tablet TAKE 1 TABLET BY MOUTH EVERY DAY 90 Tablet 3    levothyroxine (SYNTHROID) 25 mcg tablet TAKE 1 TABLET BY MOUTH EVERY DAY BEFORE BREAKFAST 90 Tablet 1    multivitamin (ONE A DAY) tablet Take 1 Tablet by mouth daily.  docusate sodium (Stool Softener) 100 mg capsule Take 100 mg by mouth daily.  furosemide (LASIX) 20 mg tablet TAKE 1 TABLET BY MOUTH EVERY DAY (Patient taking differently: Take 20 mg by mouth every seven (7) days.) 90 Tablet 3    aspirin delayed-release 81 mg tablet Take  by mouth daily.  OTHER prevagen 50mg 1 qd      DULoxetine (CYMBALTA) 30 mg capsule TAKE 1 CAPSULE BY MOUTH EVERY DAY      oxybutynin chloride XL (DITROPAN XL) 15 mg CR tablet Take 15 mg by mouth daily.  vit A/vit C/vit E/zinc/copper (PRESERVISION AREDS PO) Take  by mouth two (2) times a day.  acetaminophen (TYLENOL) 500 mg tablet Take 500 mg by mouth every six (6) hours as needed.  B.infantis-B.ani-B.long-B.bifi (Probiotic 4X) 10-15 mg TbEC Take 2 Capsules by mouth daily.  fluticasone propion-salmeteroL (ADVAIR DISKUS) 100-50 mcg/dose diskus inhaler Take 1 Puff by inhalation every twelve (12) hours.  PRN  ascorbic acid-vitamin E-biotin (HAIR,SKIN & NAILS WITH BIOTIN) 7.5-7.5-1,250 mg-unit-mcg chew Take  by mouth.  OTHER Patient is taking Omega Red daily      FOLIC ACID/MULTIVITS-MIN (ONE DAILY GUMMY VITES PO) Take 2 Tabs by mouth two (2) times a day.  vitamin c-vitamin e (CRANBERRY CONCENTRATE) Cap Take 450 mg by mouth daily.  fluticasone (FLONASE) 50 mcg/Actuation nasal spray 2 Sprays by Nasal route nightly. EVERY OTHER NIGHT      fexofenadine (ALLEGRA) 180 mg tablet Take  by mouth every thirty (30) days.  vitamin e (E GEMS) 1,000 unit capsule Take 1,000 Units by mouth daily.  diclofenac (Voltaren) 1 % gel Apply 2 g to affected area four (4) times daily. (Patient not taking: Reported on 11/16/2021)      cyanocobalamin 1,000 mcg tablet Take 1,000 mcg by mouth daily. (Patient not taking: Reported on 11/16/2021)      influenza vaccine 2017-18, 65 yrs+,,PF, (FLUZONE HIGH-DOSE 2017-18, PF,) syrg injection TO BE ADMINISTERED BY PHARMACIST FOR IMMUNIZATION (Patient not taking: Reported on 11/16/2021)       No current facility-administered medications on file prior to visit. Review of Symptoms:    Review of Systems   Constitutional: Negative for chills, fever and weight loss. HENT: Negative for nosebleeds. Eyes: Negative for blurred vision and double vision. Respiratory: Negative for cough, shortness of breath and wheezing. Cardiovascular: Negative for chest pain, palpitations, orthopnea, leg swelling and PND. Skin: Negative for rash. Neurological: Negative for dizziness and loss of consciousness. Physical Exam:      General: Well developed, in no acute distress, cooperative and alert  Heart:  reg rate and rhythm; normal S1/S2; no murmurs, no gallops or rubs. Respiratory: Clear bilaterally x 4, no wheezing or rales  Extremities:  Normal cap refill, no cyanosis, atraumatic. No edema.   Vascular: 2+ pulses symmetric in all extremities    Vitals:    11/16/21 1107 BP: 130/78   BP 1 Location: Left arm   BP Patient Position: Sitting   BP Cuff Size: Adult   Resp: 16   Height: 5' 3\" (1.6 m)   Weight: 193 lb 14.4 oz (88 kg)   SpO2: 98%       ECG done today shows sinus rhythm     Assessment:       ICD-10-CM ICD-9-CM    1. Pre-operative clearance  Z01.818 V72.84 AMB POC EKG ROUTINE W/ 12 LEADS, INTER & REP   2. Nonrheumatic aortic valve stenosis  I35.0 424.1 AMB POC EKG ROUTINE W/ 12 LEADS, INTER & REP   3. Essential hypertension, benign  I10 401.1 AMB POC EKG ROUTINE W/ 12 LEADS, INTER & REP   4. Pure hypercholesterolemia  E78.00 272.0 AMB POC EKG ROUTINE W/ 12 LEADS, INTER & REP        Plan:     1. Nonrheumatic aortic valve stenosis  Echo done 11/2020 with preserved LVEF 55-60% with grade 1 DD, mild AS with mild AI and mild MR  Continue present management  Repeat echo in 11/2022     2. Essential hypertension, benign  BP controlled. Continue anti-hypertensive therapy and low sodium diet     3. Pure hypercholesterolemia   in 9/2020  Intolerant to statins; not interested in alternatives    4. Pre-operative clearance  May be considered low risk from CV standpoint for upcoming procedure. F/u with Dr. Ita Live in 1 year    Daly Harrell NP       [facility]             Patient seen, examined by me personally. Plan discussed as detailed. Agree with note as outlined by  NP with modifications as noted. My independent physical exam reveals : Physical Exam  Vitals and nursing note reviewed. Constitutional:       Appearance: Normal appearance. Cardiovascular:      Rate and Rhythm: Normal rate and regular rhythm. Heart sounds: Murmur heard. Pulmonary:      Breath sounds: Normal breath sounds. Musculoskeletal:      Right lower leg: No edema. Left lower leg: No edema. Skin:     General: Skin is warm and dry. Neurological:      Mental Status: She is alert and oriented to person, place, and time.    Psychiatric:         Mood and Affect: Mood normal.          No additional findings noted. Agree with plan as outlined above with modifications as noted. Cleared for surgery. F/u next year with echo to evaluate AS.     Pasha Schmid MD

## 2021-12-03 ENCOUNTER — OFFICE VISIT (OUTPATIENT)
Dept: INTERNAL MEDICINE CLINIC | Age: 76
End: 2021-12-03
Payer: MEDICARE

## 2021-12-03 VITALS
TEMPERATURE: 98.2 F | HEIGHT: 63 IN | DIASTOLIC BLOOD PRESSURE: 77 MMHG | HEART RATE: 69 BPM | BODY MASS INDEX: 34.02 KG/M2 | RESPIRATION RATE: 16 BRPM | SYSTOLIC BLOOD PRESSURE: 130 MMHG | WEIGHT: 192 LBS | OXYGEN SATURATION: 97 %

## 2021-12-03 DIAGNOSIS — M17.12 PRIMARY OSTEOARTHRITIS OF LEFT KNEE: Primary | ICD-10-CM

## 2021-12-03 PROCEDURE — G8432 DEP SCR NOT DOC, RNG: HCPCS | Performed by: INTERNAL MEDICINE

## 2021-12-03 PROCEDURE — G8399 PT W/DXA RESULTS DOCUMENT: HCPCS | Performed by: INTERNAL MEDICINE

## 2021-12-03 PROCEDURE — G0463 HOSPITAL OUTPT CLINIC VISIT: HCPCS | Performed by: INTERNAL MEDICINE

## 2021-12-03 PROCEDURE — G8754 DIAS BP LESS 90: HCPCS | Performed by: INTERNAL MEDICINE

## 2021-12-03 PROCEDURE — G8536 NO DOC ELDER MAL SCRN: HCPCS | Performed by: INTERNAL MEDICINE

## 2021-12-03 PROCEDURE — 1101F PT FALLS ASSESS-DOCD LE1/YR: CPT | Performed by: INTERNAL MEDICINE

## 2021-12-03 PROCEDURE — 3017F COLORECTAL CA SCREEN DOC REV: CPT | Performed by: INTERNAL MEDICINE

## 2021-12-03 PROCEDURE — 1090F PRES/ABSN URINE INCON ASSESS: CPT | Performed by: INTERNAL MEDICINE

## 2021-12-03 PROCEDURE — G8427 DOCREV CUR MEDS BY ELIG CLIN: HCPCS | Performed by: INTERNAL MEDICINE

## 2021-12-03 PROCEDURE — G8752 SYS BP LESS 140: HCPCS | Performed by: INTERNAL MEDICINE

## 2021-12-03 PROCEDURE — G8417 CALC BMI ABV UP PARAM F/U: HCPCS | Performed by: INTERNAL MEDICINE

## 2021-12-03 PROCEDURE — 99213 OFFICE O/P EST LOW 20 MIN: CPT | Performed by: INTERNAL MEDICINE

## 2021-12-03 NOTE — PROGRESS NOTES
PROGRESS NOTE  Name: Mayte Bains   : 1945       ASSESSMENT/ PLAN:     Diagnoses and all orders for this visit:    Primary osteoarthritis of left knee    OK to proceed based on guidelines. Form completed. Follow up with Dr. Ashley Ness as otherwise planned. I have reviewed the patient's medications and risks/side effects/benefits were discussed. Diagnosis(-es) explained to patient and questions answered. Literature provided where appropriate. SUBJECTIVE:   Ms. Mayte Bains is a 78 y.o. female patient of Chrissy Lawson MD who is here for follow up of routine medical issues. Chief Complaint   Patient presents with    Pre-op Exam     Knee surgery       She has progressive pain and dysfunction of R knee. Dr. Maksim Davis plans knee replacement. No acute illnesses. She sees a cardiologist, Dr. Tavo Lipscomb, regarding aortic stenosis. No new cardiac symptoms. He has seen her in November and indicates she would be low risk to undergo surgery. At this time, she is otherwise doing well and has brought no other complaints to my attention today. For a list of the medical issues addressed today, see the assessment and plan below.     PMH:   Past Medical History:   Diagnosis Date    Arthritis     back,feet    Chronic pain     back,feet    GERD (gastroesophageal reflux disease)     Hyperlipidemia     Hypertension     Murmur     Obesity     Other ill-defined conditions(799.89)     heart murmur     Stroke ([address]     mini    Thyroid disease     Unspecified adverse effect of anesthesia     slow to wake up    Valvular heart disease        Past Surgical History:   Procedure Laterality Date    COLONOSCOPY N/A 2020    COLONOSCOPY performed by Abhijeet Vaughn MD at hospitals ENDOSCOPY    HX ORTHOPAEDIC      left ankle surgery due to fx    HX TONSILLECTOMY      HX UROLOGICAL  2013    bladder biopsy    [facility] COLONOSCOPY W/BIOPSY SINGLE/MULTIPLE  2014         [facility] EGD BALLOON DILATION ESOPHAGUS <30 MM DIAM  11/5/2012         [facility] EGD TRANSORAL BIOPSY SINGLE/MULTIPLE  11/5/2012            All: She is allergic to other food, statins-hmg-coa reductase inhibitors, amoxicillin, colestid [colestipol], gabapentin, pcn [penicillins], pepper (genus capsicum), and pravastatin. Current Outpatient Medications   Medication Sig    LAXATIVE, BISACODYL, PO Take  by mouth.  DULoxetine (CYMBALTA) 20 mg capsule Take 20 mg by mouth nightly.  b complex vitamins tablet Take 1 Tablet by mouth daily.  famotidine (Pepcid) 20 mg tablet Take 20 mg by mouth daily as needed for Heartburn.  amLODIPine (NORVASC) 5 mg tablet TAKE 1 TABLET BY MOUTH EVERY DAY    metoprolol succinate (TOPROL-XL) 25 mg XL tablet TAKE 1 TABLET BY MOUTH EVERY DAY    levothyroxine (SYNTHROID) 25 mcg tablet TAKE 1 TABLET BY MOUTH EVERY DAY BEFORE BREAKFAST    multivitamin (ONE A DAY) tablet Take 1 Tablet by mouth daily.  docusate sodium (Stool Softener) 100 mg capsule Take 100 mg by mouth daily.  furosemide (LASIX) 20 mg tablet TAKE 1 TABLET BY MOUTH EVERY DAY (Patient taking differently: Take 20 mg by mouth every seven (7) days.)    aspirin delayed-release 81 mg tablet Take  by mouth daily.  OTHER prevagen 50mg 1 qd    DULoxetine (CYMBALTA) 30 mg capsule TAKE 1 CAPSULE BY MOUTH EVERY DAY    oxybutynin chloride XL (DITROPAN XL) 15 mg CR tablet Take 15 mg by mouth daily.  vit A/vit C/vit E/zinc/copper (PRESERVISION AREDS PO) Take  by mouth two (2) times a day.  acetaminophen (TYLENOL) 500 mg tablet Take 500 mg by mouth every six (6) hours as needed.  B.infantis-B.ani-B.long-B.bifi (Probiotic 4X) 10-15 mg TbEC Take 2 Capsules by mouth daily.  fluticasone propion-salmeteroL (ADVAIR DISKUS) 100-50 mcg/dose diskus inhaler Take 1 Puff by inhalation every twelve (12) hours. PRN    ascorbic acid-vitamin E-biotin (HAIR,SKIN & NAILS WITH BIOTIN) 7.5-7.5-1,250 mg-unit-mcg chew Take  by mouth.     OTHER Patient is taking Omega Red daily    FOLIC ACID/MULTIVITS-MIN (ONE DAILY GUMMY VITES PO) Take 2 Tabs by mouth two (2) times a day.  vitamin c-vitamin e (CRANBERRY CONCENTRATE) Cap Take 450 mg by mouth daily.  fluticasone (FLONASE) 50 mcg/Actuation nasal spray 2 Sprays by Nasal route nightly. EVERY OTHER NIGHT    fexofenadine (ALLEGRA) 180 mg tablet Take  by mouth every thirty (30) days.  vitamin e (E GEMS) 1,000 unit capsule Take 1,000 Units by mouth daily. No current facility-administered medications for this visit. FH: Her family history includes Alcohol abuse in her mother; Agata Diones in her mother; Cancer in her father. SH: She reports that she has never smoked. She has never used smokeless tobacco. She reports that she does not drink alcohol. ROS: See above; Complete ROS otherwise negative. OBJECTIVE:   Vitals:   Visit Vitals  /77   Pulse 69   Temp 98.2 °F (36.8 °C) (Temporal)   Resp 16   Ht 5' 3\" (1.6 m)   Wt 192 lb (87.1 kg)   SpO2 97%   BMI 34.01 kg/m²      Gen: Pleasant 78 y.o.  female in NAD. HEENT: PERRLA. EOMI. OP moist and pink. Neck: Supple. No LAD. HEART: RRR, No M/G/R.    LUNGS: CTAB No W/R. ABDOMEN: S, NT, ND, BS+. EXTREMITIES: Warm. No C/C/E.  MUSCULOSKELETAL: Normal ROM, muscle strength 5/5 all groups. NEURO: Alert and oriented x 3. Cranial nerves grossly intact. No focal sensory or motor deficits noted. SKIN: Warm. Dry. No rashes or other lesions noted.     Lab Results   Component Value Date/Time    Sodium 141 09/15/2020 11:40 AM    Potassium 5.4 (H) 09/15/2020 11:40 AM    Chloride 105 09/15/2020 11:40 AM    CO2 23 09/15/2020 11:40 AM    Glucose 96 09/15/2020 11:40 AM    BUN 28 (H) 09/15/2020 11:40 AM    Creatinine 1.30 (H) 09/15/2020 11:40 AM    BUN/Creatinine ratio 22 09/15/2020 11:40 AM    GFR est AA 47 (L) 09/15/2020 11:40 AM    GFR est non-AA 41 (L) 09/15/2020 11:40 AM    Calcium 9.7 09/15/2020 11:40 AM    Bilirubin, total 0.4 11/12/2018 11:20 AM    ALT (SGPT) 23 11/12/2018 11:20 AM    Alk.  phosphatase 100 11/12/2018 11:20 AM    Protein, total 7.0 11/12/2018 11:20 AM    Albumin 4.4 11/12/2018 11:20 AM    A-G Ratio 1.7 11/12/2018 11:20 AM       Lab Results   Component Value Date/Time    Cholesterol, total 236 (H) 09/02/2020 10:15 AM    HDL Cholesterol 58 09/02/2020 10:15 AM    LDL, calculated 159 (H) 09/02/2020 10:15 AM    LDL, calculated 164 (H) 02/24/2020 12:33 PM    Triglyceride 109 09/02/2020 10:15 AM        Lab Results   Component Value Date/Time    WBC 5.7 09/02/2020 10:15 AM    HGB 11.7 09/02/2020 10:15 AM    HCT 37.5 09/02/2020 10:15 AM    PLATELET 105 31/79/1303 10:15 AM    MCV 84 09/02/2020 10:15 AM

## 2021-12-03 NOTE — PROGRESS NOTES
Ramy Forbes is a 78 y.o. female  Chief Complaint   Patient presents with    Pre-op Exam     Knee surgery     Health Maintenance Due   Topic Date Due    Shingrix Vaccine Age 49> (1 of 2) Never done    Breast Cancer Screen Mammogram  11/27/2019    Medicare Yearly Exam  02/24/2021    Flu Vaccine (1) 09/01/2021    COVID-19 Vaccine (3 - Booster for Pfizer series) 09/24/2021     Visit Vitals  /77   Pulse 69   Temp 98.2 °F (36.8 °C) (Temporal)   Resp 16   Ht 5' 3\" (1.6 m)   Wt 192 lb (87.1 kg)   SpO2 97%   BMI 34.01 kg/m²

## 2021-12-08 ENCOUNTER — HOSPITAL ENCOUNTER (OUTPATIENT)
Dept: PREADMISSION TESTING | Age: 76
Discharge: HOME | End: 2021-12-08
Payer: MEDICARE

## 2021-12-08 VITALS
BODY MASS INDEX: 34.38 KG/M2 | OXYGEN SATURATION: 98 % | WEIGHT: 194 LBS | DIASTOLIC BLOOD PRESSURE: 60 MMHG | RESPIRATION RATE: 16 BRPM | SYSTOLIC BLOOD PRESSURE: 151 MMHG | HEART RATE: 58 BPM | TEMPERATURE: 97.7 F | HEIGHT: 63 IN

## 2021-12-08 LAB
ABO + RH BLD: NORMAL
ALBUMIN SERPL-MCNC: 3.4 G/DL (ref 3.5–5)
ALBUMIN/GLOB SERPL: 0.9 {RATIO} (ref 1.1–2.2)
ALP SERPL-CCNC: 106 U/L (ref 45–117)
ALT SERPL-CCNC: 23 U/L (ref 12–78)
ANION GAP SERPL CALC-SCNC: 5 MMOL/L (ref 5–15)
APPEARANCE UR: CLEAR
AST SERPL-CCNC: 21 U/L (ref 15–37)
BACTERIA URNS QL MICRO: NEGATIVE /HPF
BILIRUB SERPL-MCNC: 0.6 MG/DL (ref 0.2–1)
BILIRUB UR QL: NEGATIVE
BLOOD GROUP ANTIBODIES SERPL: NORMAL
BUN SERPL-MCNC: 20 MG/DL (ref 6–20)
BUN/CREAT SERPL: 26 (ref 12–20)
CALCIUM SERPL-MCNC: 9.3 MG/DL (ref 8.5–10.1)
CHLORIDE SERPL-SCNC: 108 MMOL/L (ref 97–108)
CO2 SERPL-SCNC: 28 MMOL/L (ref 21–32)
COLOR UR: NORMAL
CREAT SERPL-MCNC: 0.77 MG/DL (ref 0.55–1.02)
EPITH CASTS URNS QL MICRO: NORMAL /LPF
ERYTHROCYTE [DISTWIDTH] IN BLOOD BY AUTOMATED COUNT: 12.9 % (ref 11.5–14.5)
EST. AVERAGE GLUCOSE BLD GHB EST-MCNC: 111 MG/DL
GLOBULIN SER CALC-MCNC: 3.8 G/DL (ref 2–4)
GLUCOSE SERPL-MCNC: 99 MG/DL (ref 65–100)
GLUCOSE UR STRIP.AUTO-MCNC: NEGATIVE MG/DL
HBA1C MFR BLD: 5.5 % (ref 4–5.6)
HCT VFR BLD AUTO: 42.5 % (ref 35–47)
HGB BLD-MCNC: 14 G/DL (ref 11.5–16)
HGB UR QL STRIP: NEGATIVE
INR PPP: 1 (ref 0.9–1.1)
KETONES UR QL STRIP.AUTO: NEGATIVE MG/DL
LEUKOCYTE ESTERASE UR QL STRIP.AUTO: NEGATIVE
MCH RBC QN AUTO: 30.7 PG (ref 26–34)
MCHC RBC AUTO-ENTMCNC: 32.9 G/DL (ref 30–36.5)
MCV RBC AUTO: 93.2 FL (ref 80–99)
NITRITE UR QL STRIP.AUTO: NEGATIVE
NRBC # BLD: 0 K/UL (ref 0–0.01)
NRBC BLD-RTO: 0 PER 100 WBC
OTHER,OTHU: NORMAL
PH UR STRIP: 5.5 [PH] (ref 5–8)
PLATELET # BLD AUTO: 247 K/UL (ref 150–400)
PMV BLD AUTO: 9.1 FL (ref 8.9–12.9)
POTASSIUM SERPL-SCNC: 4.7 MMOL/L (ref 3.5–5.1)
PROT SERPL-MCNC: 7.2 G/DL (ref 6.4–8.2)
PROT UR STRIP-MCNC: NEGATIVE MG/DL
PROTHROMBIN TIME: 10.3 SEC (ref 9–11.1)
RBC # BLD AUTO: 4.56 M/UL (ref 3.8–5.2)
RBC #/AREA URNS HPF: NORMAL /HPF (ref 0–5)
SODIUM SERPL-SCNC: 141 MMOL/L (ref 136–145)
SP GR UR REFRACTOMETRY: 1.02 (ref 1–1.03)
SPECIMEN EXP DATE BLD: NORMAL
UA: UC IF INDICATED,UAUC: NORMAL
UROBILINOGEN UR QL STRIP.AUTO: 0.2 EU/DL (ref 0.2–1)
WBC # BLD AUTO: 6.3 K/UL (ref 3.6–11)
WBC URNS QL MICRO: NORMAL /HPF (ref 0–4)

## 2021-12-08 PROCEDURE — 86901 BLOOD TYPING SEROLOGIC RH(D): CPT

## 2021-12-08 PROCEDURE — 83036 HEMOGLOBIN GLYCOSYLATED A1C: CPT

## 2021-12-08 PROCEDURE — 80053 COMPREHEN METABOLIC PANEL: CPT

## 2021-12-08 PROCEDURE — 85610 PROTHROMBIN TIME: CPT

## 2021-12-08 PROCEDURE — 85027 COMPLETE CBC AUTOMATED: CPT

## 2021-12-08 PROCEDURE — 81001 URINALYSIS AUTO W/SCOPE: CPT

## 2021-12-08 RX ORDER — SODIUM CHLORIDE, SODIUM LACTATE, POTASSIUM CHLORIDE, CALCIUM CHLORIDE 600; 310; 30; 20 MG/100ML; MG/100ML; MG/100ML; MG/100ML
25 INJECTION, SOLUTION INTRAVENOUS CONTINUOUS
Status: CANCELLED | OUTPATIENT
Start: 2021-12-17

## 2021-12-08 RX ORDER — DOCUSATE SODIUM 100 MG
CAPSULE ORAL AS NEEDED
COMMUNITY

## 2021-12-08 RX ORDER — VANCOMYCIN/0.9 % SOD CHLORIDE 1.5G/250ML
1500 PLASTIC BAG, INJECTION (ML) INTRAVENOUS ONCE
Status: CANCELLED | OUTPATIENT
Start: 2021-12-17 | End: 2021-12-17

## 2021-12-08 RX ORDER — FLUTICASONE PROPIONATE AND SALMETEROL XINAFOATE 230; 21 UG/1; UG/1
2 AEROSOL, METERED RESPIRATORY (INHALATION)
COMMUNITY

## 2021-12-08 RX ORDER — MAGNESIUM HYDROXIDE 1200 MG
TABLET,CHEWABLE ORAL AS NEEDED
COMMUNITY

## 2021-12-08 RX ORDER — GLUCOSAM/CHONDRO/HERB 149/HYAL 750-100 MG
1 TABLET ORAL DAILY
COMMUNITY

## 2021-12-08 RX ORDER — VITAMIN E 268 MG
800 CAPSULE ORAL DAILY
COMMUNITY

## 2021-12-08 RX ORDER — DICLOFENAC SODIUM 10 MG/G
GEL TOPICAL 4 TIMES DAILY
COMMUNITY

## 2021-12-08 RX ORDER — DIAPER,BRIEF,INFANT-TODD,DISP
2 EACH MISCELLANEOUS EVERY EVENING
COMMUNITY

## 2021-12-08 NOTE — PERIOP NOTES
Incentive Spirometer        Using the incentive spirometer helps expand the small air sacs of your lungs, helps you breathe deeply, and helps improve your lung function. Use your incentive spirometer twice a day (10 breaths each time) prior to surgery. How to Use Your Incentive Spirometer:  1. Hold the incentive spirometer in an upright position. 2. Breathe out as usual.   3. Place the mouthpiece in your mouth and seal your lips tightly around it. 4. Take a deep breath. Breathe in slowly and as deeply as possible. Keep the blue flow rate guide between the arrows. 5. Hold your breath as long as possible. Then exhale slowly and allow the piston to fall to the bottom of the column. 6. Rest for a few seconds and repeat steps one through five at least 10 times. PAT Tidal Volume_2400_________________  x_2_______________  Date_12/8/21______________________    Gaylan Zackary THE INCENTIVE SPIROMETER WITH YOU TO THE HOSPITAL ON THE DAY OF YOUR SURGERY. Opportunity given to ask and answer questions as well as to observe return demonstration.     Patient signature_____________________________    Witness____________________________

## 2021-12-08 NOTE — PERIOP NOTES
Orthopedic and Spine Patients: Instructions on When You Can   Eat or Drink Before Surgery      You have been provided 2 pre-surgery drinks received at your pre-admission testing appointment.  Night before surgery:  o You should drink one bottle of the  pre-surgery drink at bedtime. No food after midnight!  Day of Surgery:  o Complete 2nd bottle of the pre-surgery drink 1 hour prior to arrival at hospital.  For questions call Pre-Admission Testing at [phone]. They are available from 8:00am-5:00pm, Monday through Friday.

## 2021-12-08 NOTE — PERIOP NOTES
The [facility] \"Your Path to a More Active Life\" orthopedic total knee or total hip educational video and the [facility] patient handbook provided & reviewed during the patients pre-admission testing (PAT) appointment. An opportunity for questions was provided, patient verbalized understanding. Faxed note to Dr Jesusita Vela about lyrica order- pt has gabapentin allergy with reaction of of edema and GI problems. Asked Dr Jesusita Vela if still wants to order lyrica the day of surgery. Fax confirmed. No lyrica the day of surgery per Dr Jesusita Vela.

## 2021-12-08 NOTE — PERIOP NOTES
Hibiclens/Chlorhexidine    Preventing Infections Before and After  Your Surgery    IMPORTANT INSTRUCTIONS    Please read and follow these instructions carefully. If you are unable to comply with the below instructions your procedure will be cancelled. Every Night for Three (3) nights before your surgery:  1. Shower with an antibacterial soap, such as Dial, or the soap provided at your preassessment appointment. A shower is better than a bath for cleaning your skin. 2. If needed, ask someone to help you reach all areas of your body. Dont forget to clean your belly button with every shower. The night before your surgery: If you lose your Hibiclens/chlorhexidine please contact surgery center or you can purchase it at a local pharmacy  1. On the night before your surgery, shower with an antibacterial soap, such as Dial, or the soap provided at your preassessment appointment. 2. With one packet of Hibiclens/Chlorhexidine in hand, turn water off.  3. Apply Hibiclens antiseptic skin cleanser with a clean, freshly washed washcloth. ? Gently apply to your body from chin to toes (except the genital area) and especially the area(s) where your incision(s) will be. ? Leave Hibiclens/Chlorhexidine on your skin for at least 20 seconds. CAUTION: If needed, Hibiclens/chlorhexidine may be used to clean the folds of skin of the legs (such as in the area of the groin) and on your buttocks and hips. However, do not use Hibiclens/Chlorhexidine above the neck or in the genital area (your bottom) or put inside any area of your body. 4. Turn the water back on and rinse. 5. Dry gently with a clean, freshly washed towel. 6. After your shower, do not use any powder, deodorant, perfumes or lotion. 7. Use clean, freshly washed towels and washcloths every time you shower. 8. Wear clean, freshly washed pajamas to bed the night before surgery. 9. Sleep on clean, freshly washed sheets.   10. Do not allow pets to sleep in your bed with you. The Morning of your surgery:  1. Shower again thoroughly with an antibacterial soap, such as Dial or the soap provided at your preassessment appointment. If needed, ask someone for help to reach all areas of your body. Dont forget to clean your belly button! Rinse. 2. Dry gently with a clean, freshly washed towel. 3. After your shower, do not use any powder, deodorant, perfumes or lotion prior to surgery. 4. Put on clean, freshly washed clothing. Tips to help prevent infections after your surgery:  1. Protect your surgical wound from germs:  ? Hand washing is the most important thing you and your caregivers can do to prevent infections. ? Keep your bandage clean and dry! ? Do not touch your surgical wound. 2. Use clean, freshly washed towels and washcloths every time you shower; do not share bath linens with others. 3. Until your surgical wound is healed, wear clothing and sleep on bed linens each day that are clean and freshly washed. 4. Do not allow pets to sleep in your bed with you or touch your surgical wound. 5. Do not smoke  smoking delays wound healing. This may be a good time to stop smoking. 6. If you have diabetes, it is important for you to manage your blood sugar levels properly before your surgery as well as after your surgery. Poorly managed blood sugar levels slow down wound healing and prevent you from healing completely. If you lose your Hibiclens/chlorhexidine, please call the [facility], or it is available for purchase at your pharmacy.                ___________________      ___________________      ________________  (Signature of Patient)          (Witness)                   (Date and Time)

## 2021-12-08 NOTE — PERIOP NOTES
[facility]  Joint/Spine Preoperative Instructions        Surgery Date 12/17/21          Time of Arrival: To be called @ [phone]  Covid test scheduled for 12/13    1. On the day of your surgery, please report to the Surgical Services Registration Desk and sign in at your designated time. The Surgery Center is located to the right of the Emergency Room. 2. You must have someone with you to drive you home. You should not drive a car for 24 hours following surgery. Please make arrangements for a friend or family member to stay with you for the first 24 hours after your surgery. 3. No food after midnight . Medications morning of surgery should be taken with a sip of water. Please follow pre-surgery drink instructions that were given at your Pre Admission Testing appointment. 4. We recommend you do not drink any alcoholic beverages for 24 hours before and after your surgery. 5. Contact your surgeons office for instructions on the following medications: non-steroidal anti-inflammatory drugs (i.e. Advil, Aleve), vitamins, and supplements. (Some surgeons will want you to stop these medications prior to surgery and others may allow you to take them)  **If you are currently taking Plavix, Coumadin, Aspirin and/or other blood-thinning agents, contact your surgeon for instructions. ** Your surgeon will partner with the physician prescribing these medications to determine if it is safe to stop or if you need to continue taking. Please do not stop taking these medications without instructions from your surgeon    6. Wear comfortable clothes. Wear glasses instead of contacts. Do not bring any money or jewelry. Please bring picture ID, insurance card, and any prearranged co-payment or hospital payment. Do not wear make-up, particularly mascara the morning of your surgery. Do not wear nail polish, particularly if you are having foot /hand surgery.   Wear your hair loose or down, no ponytails, buns, mariah pins or clips. All body piercings must be removed. Please shower with antibacterial soap for three consecutive days before and on the morning of surgery, but do not apply any lotions, powders or deodorants after the shower on the day of surgery. Please use a fresh towels after each shower. Please sleep in clean clothes and change bed linens the night before surgery. Please do not shave for 48 hours prior to surgery. Shaving of the face is acceptable. 7. You should understand that if you do not follow these instructions your surgery may be cancelled. If your physical condition changes (I.e. fever, cold or flu) please contact your surgeon as soon as possible. 8. It is important that you be on time. If a situation occurs where you may be late, please call [phone] (OR Holding Area). 9. If you have any questions and or problems, please call [phone] (Pre-admission Testing). 10. Your surgery time may be subject to change. You will receive a phone call the evening prior if your time changes. 11.  If having outpatient surgery, you must have someone to drive you here, stay with you during the duration of your stay, and to drive you home at time of discharge. 12. The following link is for the educational video for patients and/or families. http://[facility]/. [email or web address]-ppykgnt-[email or web address]-materials    Special Instructions: follow instructions regarding all vitamins and supplements, diclofenac, aspirin  per dr Lizabeth Cooks: tylenol, dulcolax    I understand a pre-operative phone call will be made to verify my surgery time. In the event that I am not available, I give permission for a message to be left on my answering service and/or with another person?   yes         ___________________      __________   _________    Farzaneh Leal of Patient) (Witness)                (Date and Time)

## 2021-12-09 LAB
BACTERIA SPEC CULT: NORMAL
BACTERIA SPEC CULT: NORMAL
SERVICE CMNT-IMP: NORMAL

## 2021-12-09 NOTE — ADVANCED PRACTICE NURSE
PAT Nurse Practitioner   Pre-Operative Chart Review/Assessment:-ORTHOPEDIC/NEUROSURGICAL SPINE                Patient Name:  Aura Aragon                                                           Age:   78 y.o.    :  1945     Today's Date:  2021     Date of PAT:   21     Date of Surgery:    2021      Procedure(s):  Right  Total Knee Arthroplasty     Surgeon:   Lyla Wesley     Medical Clearance:  Dr. Pancho Gaines:      1)  Cardiac Clearance:   Dr. Gema Shafer        2)  Program for Diabetes Health Consult:  Not indicated-A1C 5.5      3)  Sleep Apnea evaluation:   Not indicated-NAIMA 2      4) Treatment for MRSA/Staph Aureus:  Negative      5) Additional Concerns:   Aortic stenosis, hx of TIA, OAB, slow to wake after anesthesia                 Vital Signs:         Visit Vitals  BP (!) 151/60 (BP 1 Location: Left upper arm, BP Patient Position: At rest;Sitting)   Pulse (!) 58   Temp 97.7 °F (36.5 °C)   Resp 16   Ht 5' 3\" (1.6 m)   Wt 88 kg (194 lb 0.1 oz)   SpO2 98%   BMI 34.37 kg/m²                        ____________________________________________  PAST MEDICAL HISTORY  Past Medical History:   Diagnosis Date    Aortic stenosis     Arthritis     back,feet    Chronic pain     back,feet    GERD (gastroesophageal reflux disease)     Hyperlipidemia     Hypertension     Murmur     Obesity     Other ill-defined conditions(799.89)     heart murmur     Stroke ([facility] [address]     TIA, had aphagia and has resolved     Thyroid disease     Unspecified adverse effect of anesthesia     slow to wake up    Valvular heart disease       ____________________________________________  PAST SURGICAL HISTORY  Past Surgical History:   Procedure Laterality Date    COLONOSCOPY N/A 2020    COL[facility] performed [facility] Genia Gomez [facility] at [facility] ENDOSCOPY    HX ORTHOPAEDIC Left     left ankle surgery due to fx    HX TONSILLECTOMY      HX UROLOGICAL      bladder biopsy    [place] COLONOSCOPY W/BIOPSY SINGLE/MULTIPLE  2/28/2014         TX EGD BALLOON DILATION ESOPHAGUS <30 MM DIAM  11/5/2012         TX EGD TRANSORAL BIOPSY SINGLE/MULTIPLE  11/5/2012           ____________________________________________  HOME MEDICATIONS    Current Outpatient Medications   Medication Sig    fluticasone propion-salmeteroL (Advair HFA) 230-21 mcg/actuation inhaler Take 2 Puffs by inhalation two (2) times daily as needed.  diclofenac (VOLTAREN) 1 % gel Apply  to affected area four (4) times daily.  multivit-minerals/folic acid (WOMEN'S MULTIVITAMIN GUMMIES PO) Take 2 Tablets by mouth two (2) times a day.  Phenylephrine-DM-Acetaminophen (Daytime Cold-Flu) 5- mg/15 mL liqd Take  by mouth as needed.  ferrous fumarate/vit Bcomp,C (SUPER B COMPLEX PO) Take 1 Tablet by mouth every other day. Takes every other night    biotin 10,000 mcg cap Take 2 Tablets by mouth every evening.  vitamin E (AQUA GEMS) 400 unit capsule Take 800 Units by mouth daily.  magnesium hydroxide (Dulcolax, magnesium hydroxide,) 1,200 mg chew Take  by mouth as needed.  omega 3-DHA-EPA-fish oil 1,000 mg (120 mg-180 mg) capsule Take 1 Capsule by mouth daily. Omega3  bar red    ascorbic acid/vitamin E/biotin (HAIR, SKIN, NAILS WITH BIOTIN PO) Take 2 Tablets by mouth daily.  DULoxetine (CYMBALTA) 20 mg capsule Take 20 mg by mouth nightly.  famotidine (Pepcid) 20 mg tablet Take 20 mg by mouth daily as needed for Heartburn.  amLODIPine (NORVASC) 5 mg tablet TAKE 1 TABLET BY MOUTH EVERY DAY (Patient taking differently: Take 5 mg by mouth daily.)    metoprolol succinate (TOPROL-XL) 25 mg XL tablet TAKE 1 TABLET BY MOUTH EVERY DAY (Patient taking differently: Take 25 mg by mouth daily.)    levothyroxine (SYNTHROID) 25 mcg tablet TAKE 1 TABLET BY MOUTH EVERY DAY BEFORE BREAKFAST (Patient taking differently: Take 25 mcg by mouth Daily (before breakfast). )    furosemide (LASIX) 20 mg tablet TAKE 1 TABLET BY MOUTH EVERY DAY (Patient taking differently: Take 20 mg by mouth daily as needed.)    aspirin delayed-release 81 mg tablet Take 81 mg by mouth daily.  DULoxetine (CYMBALTA) 30 mg capsule Take 30 mg by mouth daily.  oxybutynin chloride XL (DITROPAN XL) 15 mg CR tablet Take 15 mg by mouth daily.  vit A/vit C/vit E/zinc/copper (PRESERVISION AREDS PO) Take 2 Tablets by mouth daily.  acetaminophen (TYLENOL) 500 mg tablet Take 1,000 mg by mouth three (3) times daily.  B.infantis-B.ani-B.long-B.bifi (Probiotic 4X) 10-15 mg TbEC Take 1 Capsule by mouth daily.  ascorbic acid-vitamin E-biotin (HAIR,SKIN & NAILS WITH BIOTIN) 7.5-7.5-1,250 mg-unit-mcg chew Take  by mouth.  FOLIC ACID/MULTIVITS-MIN (ONE DAILY GUMMY VITES PO) Take 2 Tabs by mouth two (2) times a day.  vitamin c-vitamin e (CRANBERRY CONCENTRATE) Cap Take 450 mg by mouth daily.  fexofenadine (ALLEGRA) 180 mg tablet Take 180 mg by mouth every thirty (30) days. When getting allergy injections    b complex vitamins tablet Take 1 Tablet by mouth daily.      No current facility-administered medications for this encounter.      ____________________________________________  ALLERGIES  Allergies   Allergen Reactions    Other Food Nausea and Vomiting     Black eye peas    Statins-Hmg-Coa Reductase Inhibitors Other (comments)     Severe pain and weakness    Amoxicillin Hives    Colestid [Colestipol] Myalgia    Gabapentin Other (comments)     Edema, GI problem    Pcn [Penicillins] Hives    Pepper (Genus Capsicum) Nausea Only     Upset stomach    Pravastatin Myalgia     Severe pain and weakness with all statins      ____________________________________________  SOCIAL HISTORY  Social History     Tobacco Use    Smoking status: Never Smoker    Smokeless tobacco: Never Used   Substance Use Topics    Alcohol use: No      ____________________________________________  COVID VACCINATION STATUS:      Internal Administration   First Dose COVID-19, PFIZER, MRNA, LNP-S, PF, 30MCG/0.3ML DOSE  03/03/2021   Second Dose COVID-19, PFIZER, MRNA, LNP-S, PF, 30MCG/0.3ML DOSE  03/24/2021      Last COVID Lab No results found for: Sophie Mccabey, RCV2CT, CVD2M, COV2, XPLCVT, [address], [address] [facility], [address], [address], [address] , [address] [place] [address], [address] [address]                   Labs:     Hospital Outpatient Visit on 12/08/2021   Component Date Value Ref Range Status    WBC 12/08/2021 6.3  3.6 - 11.0 K/uL Final    RBC 12/08/2021 4.56  3.80 - 5.20 M/uL Final    HGB 12/08/2021 14.0  11.5 - 16.0 g/dL Final    HCT 12/08/2021 42.5  35.0 - 47.0 % Final    MCV 12/08/2021 93.2  80.0 - 99.0 FL Final    MCH 12/08/2021 30.7  26.0 - 34.0 PG Final    MCHC 12/08/2021 32.9  30.0 - 36.5 g/dL Final    RDW 12/08/2021 12.9  11.5 - 14.5 % Final    PLATELET 26/45/2003 284  150 - 400 K/uL Final    MPV 12/08/2021 9.1  8.9 - 12.9 FL Final    NRBC 12/08/2021 0.0  0  WBC Final    ABSOLUTE NRBC 12/08/2021 0.00  0.00 - 0.01 K/uL Final    Special Requests: 12/08/2021 NO SPECIAL REQUESTS    Final    Culture result: 12/08/2021 MRSA NOT PRESENT    Final    Culture result: 12/08/2021 Screening of patient nares for MRSA is for surveillance purposes and, if positive, to facilitate isolation considerations in high risk settings. It is not intended for automatic decolonization interventions per se as regimens are not sufficiently effective to warrant routine use.     Final    Hemoglobin A1c 12/08/2021 5.5  4.0 - 5.6 % Final    Comment: NEW METHOD  PLEASE NOTE NEW REFERENCE RANGE  (NOTE)  HbA1C Interpretive Ranges  <5.7              Normal  5.7 - 6.4         Consider Prediabetes  >6.5              Consider Diabetes      Est. average glucose 12/08/2021 111  mg/dL Final    INR 12/08/2021 1.0  0.9 - 1.1   Final    A single therapeutic range for Vit K antagonists may not be optimal for all indications - see June, 2008 issue of Chest, American College of Chest Physicians Evidence-Based Clinical Practice Guidelines, 8th Edition.  Prothrombin time 12/08/2021 10.3  9.0 - 11.1 sec Final    Color 12/08/2021 YELLOW/STRAW    Final    Color Reference Range: Straw, Yellow or Dark Yellow    Appearance 12/08/2021 CLEAR  CLEAR   Final    Specific gravity 12/08/2021 1.021  1.003 - 1.030   Final    pH (UA) 12/08/2021 5.5  5.0 - 8.0   Final    Protein 12/08/2021 Negative  NEG mg/dL Final    Glucose 12/08/2021 Negative  NEG mg/dL Final    Ketone 12/08/2021 Negative  NEG mg/dL Final    Bilirubin 12/08/2021 Negative  NEG   Final    Blood 12/08/2021 Negative  NEG   Final    Urobilinogen 12/08/2021 0.2  0.2 - 1.0 EU/dL Final    Nitrites 12/08/2021 Negative  NEG   Final    Leukocyte Esterase 12/08/2021 Negative  NEG   Final    WBC 12/08/2021 0-4  0 - 4 /hpf Final    RBC 12/08/2021 5-10  0 - 5 /hpf Final    Epithelial cells 12/08/2021 FEW  FEW /lpf Final    Epithelial cell category consists of squamous cells and /or transitional urothelial cells. Renal tubular cells, if present, are separately identified as such.     Bacteria 12/08/2021 Negative  NEG /hpf Final    UA:UC IF INDICATED 12/08/2021 CULTURE NOT INDICATED BY UA RESULT  CNI   Final    Other: 12/08/2021 Renal Epithelial cells Present    Final    Sodium 12/08/2021 141  136 - 145 mmol/L Final    Potassium 12/08/2021 4.7  3.5 - 5.1 mmol/L Final    Chloride 12/08/2021 108  97 - 108 mmol/L Final    CO2 12/08/2021 28  21 - 32 mmol/L Final    Anion gap 12/08/2021 5  5 - 15 mmol/L Final    Glucose 12/08/2021 99  65 - 100 mg/dL Final    BUN 12/08/2021 20  6 - 20 MG/DL Final    Creatinine 12/08/2021 0.77  0.55 - 1.02 MG/DL Final    BUN/Creatinine ratio 12/08/2021 26* 12 - 20   Final    GFR est AA 12/08/2021 >60  >60 ml/min/1.73m2 Final    GFR est non-AA 12/08/2021 >60  >60 ml/min/1.73m2 Final    Estimated GFR is calculated using the IDMS-traceable Modification of Diet in Renal Disease (MDRD) Study equation, reported for both  Americans (GFRAA) and non- Americans (GFRNA), and normalized to 1.73m2 body surface area. The physician must decide which value applies to the patient.  Calcium 12/08/2021 9.3  8.5 - 10.1 MG/DL Final    Bilirubin, total 12/08/2021 0.6  0.2 - 1.0 MG/DL Final    ALT (SGPT) 12/08/2021 23  12 - 78 U/L Final    AST (SGOT) 12/08/2021 21  15 - 37 U/L Final    Alk. phosphatase 12/08/2021 106  45 - 117 U/L Final    Protein, total 12/08/2021 7.2  6.4 - 8.2 g/dL Final    Albumin 12/08/2021 3.4* 3.5 - 5.0 g/dL Final    Globulin 12/08/2021 3.8  2.0 - 4.0 g/dL Final    A-G Ratio 12/08/2021 0.9* 1.1 - 2.2   Final    Crossmatch Expiration 12/08/2021 12/20/2021,2359   Final    ABO/Rh(D) 12/08/2021 O POSITIVE   Final    Antibody screen 12/08/2021 NEG   Final        XR Results (most recent):    Results from [place] encounter on 11/04/19    XR BA SWALLOW ESOPHOGRAM    Narrative  Clinical indication: Dysphagia. Preliminary examination PA chest is unremarkable. Double contrast barium swallow is performed. Fluoroscopy time is 0.8 minutes, 13  images were obtained. The swallowing mechanism is intact. There is mild abnormality of the  peristalsis. There is a small sliding hiatal hernia with no significant  stricture or stasis of the barium tablet. Impression  IMPRESSION: Hiatal hernia, abnormal peristalsis. Skin:   Denies open wounds, cuts, sores, rashes or other areas of concern in PAT assessment.         Kaylene Scruggs NP

## 2021-12-13 ENCOUNTER — HOSPITAL ENCOUNTER (OUTPATIENT)
Dept: PREADMISSION TESTING | Age: 76
Discharge: HOME | End: 2021-12-13
Payer: MEDICARE

## 2021-12-13 PROCEDURE — U0005 INFEC AGEN DETEC AMPLI PROBE: HCPCS

## 2021-12-14 LAB
SARS-COV-2, XPLCVT: NOT DETECTED
SOURCE, COVRS: NORMAL

## 2021-12-16 NOTE — H&P
Viktor Rodriguez MD - Adult Reconstruction and Total Joint Replacement     Orthopaedic History and Physical        NAME: Stacie Germain       :         MRN:  [id number]        Subjective:   Patient ID: Stacie Germain is a 78 y.o. female. Chief Complaint: Follow-up of the Right Knee and Follow-up of the Left Knee    Pleasant 68-year-old female coming in today for evaluation of bilateral knees. Longstanding right over left knee arthritis. She has been managing with injections for many years now. She gets just about 3 months out of the injections and feels that she has a new symptom of instability of the right knee lately. She simply does not trust it and thinks the pain is becoming more severe. Contralateral side is status quo. No hip or groin pain and no paresthesias.  Hoping for definitive care  Her daughter is available to help at the time of surgery      Patient Active Problem List    Diagnosis Date Noted    Nonrheumatic aortic valve stenosis 2017    Heart murmur 2016    Advanced care planning/counseling discussion 2016    DDD (degenerative disc disease), lumbar 10/04/2013    Obesity 2013    Essential hypertension, benign 2010    Pure hypercholesterolemia 2010    Overweight 2010    Hypothyroidism 2010    DJD (degenerative joint disease) 2010    Overactive bladder 2010    Diverticulosis 2010     Past Medical History:   Diagnosis Date    Aortic stenosis     Arthritis     back,feet    Chronic pain     back,feet    GERD (gastroesophageal reflux disease)     Hyperlipidemia     Hypertension     Murmur     Obesity     Other ill-defined conditions(799.89)     heart murmur     Stroke ([facility] [address]     TIA, had aphagia and has resolved     Thyroid disease     Unspecified adverse effect of anesthesia     slow to wake up    Valvular heart disease       Past Surgical History:   Procedure Laterality Date    COLONOSCOPY N/A 1/27/2020    COLON[facility] performed by Rosario Mccurdy MD at [facility] ENDOSCOPY    HX ORTHOPAEDIC Left     left ankle surgery due to fx    HX TONSILLECTOMY      HX UROLOGICAL  2013    bladder biopsy    [place] COLONOSCOPY W/BIOPSY SINGLE/MULTIPLE  2/28/2014         [place] EGD BALLOON DILATION ESOPHAGUS <30 MM DIAM  11/5/2012         [place] EGD TRANSORAL BIOPSY SINGLE/MULTIPLE  11/5/2012           Prior to Admission medications    Medication Sig Start Date End Date Taking? Authorizing Provider   fluticasone propion-salmeteroL (Advair HFA) 230-21 mcg/actuation inhaler Take 2 Puffs by inhalation two (2) times daily as needed. Provider, Historical   diclofenac (VOLTAREN) 1 % gel Apply  to affected area four (4) times daily. Provider, Historical   multivit-minerals/folic acid (WOMEN'S MULTIVITAMIN GUMMIES PO) Take 2 Tablets by mouth two (2) times a day. Provider, Historical   Phenylephrine-DM-Acetaminophen (Daytime Cold-Flu) 5- mg/15 mL liqd Take  by mouth as needed. Provider, Historical   ferrous fumarate/vit Bcomp,C (SUPER B COMPLEX PO) Take 1 Tablet by mouth every other day. Takes every other night    Provider, Historical   biotin 10,000 mcg cap Take 2 Tablets by mouth every evening. Provider, Historical   vitamin E (AQUA GEMS) 400 unit capsule Take 800 Units by mouth daily. Provider, Historical   magnesium hydroxide (Dulcolax, magnesium hydroxide,) 1,200 mg chew Take  by mouth as needed. Provider, Historical   omega 3-DHA-EPA-fish oil 1,000 mg (120 mg-180 mg) capsule Take 1 Capsule by mouth daily. Omega3  bar red    Provider, Historical   ascorbic acid/vitamin E/biotin (HAIR, SKIN, NAILS WITH BIOTIN PO) Take 2 Tablets by mouth daily. Provider, Historical   DULoxetine (CYMBALTA) 20 mg capsule Take 20 mg by mouth nightly. Provider, Historical   b complex vitamins tablet Take 1 Tablet by mouth daily.     Provider, Historical   famotidine (Pepcid) 20 mg tablet Take 20 mg by mouth daily as needed for Heartburn. Provider, Historical   amLODIPine (NORVASC) 5 mg tablet TAKE 1 TABLET BY MOUTH EVERY DAY  Patient taking differently: Take 5 mg by mouth daily. 10/31/21   Chrissy Lawson MD   metoprolol succinate (TOPROL-XL) 25 mg XL tablet TAKE 1 TABLET BY MOUTH EVERY DAY  Patient taking differently: Take 25 mg by mouth daily. 8/19/21   Jenn Alba NP   levothyroxine (SYNTHROID) 25 mcg tablet TAKE 1 TABLET BY MOUTH EVERY DAY BEFORE BREAKFAST  Patient taking differently: Take 25 mcg by mouth Daily (before breakfast). 8/19/21   Chrissy Lawson MD   furosemide (LASIX) 20 mg tablet TAKE 1 TABLET BY MOUTH EVERY DAY  Patient taking differently: Take 20 mg by mouth daily as needed. 7/6/21   Jenn Alba NP   aspirin delayed-release 81 mg tablet Take 81 mg by mouth daily. Provider, Historical   DULoxetine (CYMBALTA) 30 mg capsule Take 30 mg by mouth daily. 11/11/20   Provider, Historical   oxybutynin chloride XL (DITROPAN XL) 15 mg CR tablet Take 15 mg by mouth daily. Provider, Historical   vit A/vit C/vit E/zinc/copper (PRESERVISION AREDS PO) Take 2 Tablets by mouth daily. Provider, Historical   acetaminophen (TYLENOL) 500 mg tablet Take 1,000 mg by mouth three (3) times daily. Provider, Historical   B.infantis-B.ani-B.long-B.bifi (Probiotic 4X) 10-15 mg TbEC Take 1 Capsule by mouth daily. Provider, Historical   ascorbic acid-vitamin E-biotin (HAIR,SKIN & NAILS WITH BIOTIN) 7.5-7.5-1,250 mg-unit-mcg chew Take  by mouth. Provider, Historical   FOLIC ACID/MULTIVITS-MIN (ONE DAILY GUMMY VITES PO) Take 2 Tabs by mouth two (2) times a day. Provider, Historical   vitamin c-vitamin e (CRANBERRY CONCENTRATE) Cap Take 450 mg by mouth daily. Provider, Historical   fexofenadine (ALLEGRA) 180 mg tablet Take 180 mg by mouth every thirty (30) days.  When getting allergy injections 4/6/10   Provider, Historical     Allergies   Allergen Reactions    Other Food Nausea and Vomiting     Black eye peas    Statins-Hmg-Coa Reductase Inhibitors Other (comments)     Severe pain and weakness    Amoxicillin Hives    Colestid [Colestipol] Myalgia    Gabapentin Other (comments)     Edema, GI problem    Pcn [Penicillins] Hives    Pepper (Genus Capsicum) Nausea Only     Upset stomach    Pravastatin Myalgia     Severe pain and weakness with all statins      Social History     Tobacco Use    Smoking status: Never Smoker    Smokeless tobacco: Never Used   Substance Use Topics    Alcohol use: No      Family History   Problem Relation Age of Onset    Alcohol abuse Mother     OSTEOARTHRITIS Mother     Cancer Father         testicular        REVIEW OF SYSTEMS: A comprehensive review of systems was negative except for that written in the HPI. Objective:   Constitutional:@ appears stated age. Pt is cooperative and is in no acute distress. Well nourished. Well developed. Body habitus is normal. Body mass index is 31.89 kg/m². Eyes: Sclera are nonicteric. Respiratory: No labored breathing. Cardiovascular: No marked edema. Well perfused extremities bilaterally. Skin: No marked skin ulcers. No lymphedema or skin abnormalities. Neurological: No marked sensory loss noted. Grossly neurovascularly intact. Both lower extremities are intact to distal sensory and motor function. Psychiatric: Alert and oriented x3. MUSCULOSKELETAL:   Gait is antalgic on the right knee. Painless trunk ROM. No obvious LLD. 5/5 BLE strength. Right knee has a varus contracture, it is non correctable, she is patellofemoral crepitation and exquisite medial joint line tenderness. Left knee has normal range of motion and no tenderness. Radiographs:   Order: XR KNEE 4+ VW LEFT - Indication: Localized osteoarthritis of left   knee  Order: XR KNEE 4+ VW RIGHT - Indication: Localized osteoarthritis of right   knee    X-ray knee left 4+ views ([id number])    Result Date: 11/8/2021  Standing. Views: JACOB Yeager, Nikos. Impression: Left knee with minimal age-appropriate early DJD    X-ray knee right 4+ views ([id number])    Result Date: 11/8/2021  Standing. Views: AP, Lat, Langerma, Aabenraa. Impression: End-stage DJD of the right knee, grade 4 change medially with flattening of the femoral condyle and subchondral cyst formation of the tibia and femur, tricompartmental changes on sunrise view    Assessment:     ICD-10-CM   1. Localized osteoarthritis of right knee M17.11   2. Localized osteoarthritis of left knee M17.12     Patient Active Problem List   Diagnosis    Acquired spondylolisthesis    Lumbosacral spondylosis without myelopathy    Low back pain    Advanced care planning/counseling discussion    DDD (degenerative disc disease), lumbar    Diverticulosis    DJD (degenerative joint disease)    Essential hypertension, benign    Heart murmur    Hypothyroidism    Nonrheumatic aortic valve stenosis    Obesity    Overactive bladder    Overweight    Pure hypercholesterolemia    Abnormal gynecological examination    Menopausal symptom    Pain of breast    Stress incontinence of urine     Plan:   Recommended right total knee replacement. Conservative treatments including activity modification, medication, and steroid injections have not successfully relieved pain. Surgery was discussed at length with the pt today. We went over all the pertinent risks, benefits, and alternatives to the procedure. They understand no guarantees can be made about the outcome and they would like to proceed. I discussed the pre-op total joint class and general recovery timeline with the pt. The pt understands the need for medical clearance. All questions were answered to her satisfaction.  Follow up prn    Orders Placed This Encounter   Procedures    X-ray knee left 4+ views ([id number])    X-ray knee right 4+ views ([id number])    BMI >=25 PATIENT INSTRUCTIONS & EDUCATION     Jeff Weinstein [place]    Supervising physician: Rosalind Nelson CLAUDE Navarro

## 2021-12-17 ENCOUNTER — ANESTHESIA (OUTPATIENT)
Dept: SURGERY | Age: 76
DRG: 470 | End: 2021-12-17
Payer: MEDICARE

## 2021-12-17 ENCOUNTER — ANESTHESIA EVENT (OUTPATIENT)
Dept: SURGERY | Age: 76
DRG: 470 | End: 2021-12-17
Payer: MEDICARE

## 2021-12-17 ENCOUNTER — HOSPITAL ENCOUNTER (INPATIENT)
Age: 76
LOS: 3 days | Discharge: HOME HEALTH | DRG: 470 | End: 2021-12-20
Attending: ORTHOPAEDIC SURGERY | Admitting: ORTHOPAEDIC SURGERY
Payer: MEDICARE

## 2021-12-17 DIAGNOSIS — Z96.651 STATUS POST TOTAL RIGHT KNEE REPLACEMENT: Primary | ICD-10-CM

## 2021-12-17 PROBLEM — Z96.659 STATUS POST TOTAL KNEE REPLACEMENT: Status: ACTIVE | Noted: 2021-12-17

## 2021-12-17 PROCEDURE — 0SRC0J9 REPLACEMENT OF RIGHT KNEE JOINT WITH SYNTHETIC SUBSTITUTE, CEMENTED, OPEN APPROACH: ICD-10-PCS | Performed by: ORTHOPAEDIC SURGERY

## 2021-12-17 PROCEDURE — 2709999900 HC NON-CHARGEABLE SUPPLY

## 2021-12-17 PROCEDURE — 74011250636 HC RX REV CODE- 250/636: Performed by: NURSE ANESTHETIST, CERTIFIED REGISTERED

## 2021-12-17 PROCEDURE — 65270000029 HC RM PRIVATE

## 2021-12-17 PROCEDURE — C1776 JOINT DEVICE (IMPLANTABLE): HCPCS | Performed by: ORTHOPAEDIC SURGERY

## 2021-12-17 PROCEDURE — 77030000032 HC CUF TRNQT ZIMM -B: Performed by: ORTHOPAEDIC SURGERY

## 2021-12-17 PROCEDURE — 77030010785: Performed by: ORTHOPAEDIC SURGERY

## 2021-12-17 PROCEDURE — 74011250636 HC RX REV CODE- 250/636: Performed by: STUDENT IN AN ORGANIZED HEALTH CARE EDUCATION/TRAINING PROGRAM

## 2021-12-17 PROCEDURE — 77030026438 HC STYL ET INTUB CARD -A: Performed by: NURSE ANESTHETIST, CERTIFIED REGISTERED

## 2021-12-17 PROCEDURE — 2709999900 HC NON-CHARGEABLE SUPPLY: Performed by: ORTHOPAEDIC SURGERY

## 2021-12-17 PROCEDURE — 74011000250 HC RX REV CODE- 250: Performed by: NURSE ANESTHETIST, CERTIFIED REGISTERED

## 2021-12-17 PROCEDURE — 76010000162 HC OR TIME 1.5 TO 2 HR INTENSV-TIER 1: Performed by: ORTHOPAEDIC SURGERY

## 2021-12-17 PROCEDURE — 77030038269 HC DRN EXT URIN PURWCK BARD -A

## 2021-12-17 PROCEDURE — 77030008684 HC TU ET CUF COVD -B: Performed by: NURSE ANESTHETIST, CERTIFIED REGISTERED

## 2021-12-17 PROCEDURE — 77030034479 HC ADH SKN CLSR PRINEO J&J -B: Performed by: ORTHOPAEDIC SURGERY

## 2021-12-17 PROCEDURE — 74011250636 HC RX REV CODE- 250/636: Performed by: PHYSICIAN ASSISTANT

## 2021-12-17 PROCEDURE — 77030036722: Performed by: ORTHOPAEDIC SURGERY

## 2021-12-17 PROCEDURE — 77030018673: Performed by: ORTHOPAEDIC SURGERY

## 2021-12-17 PROCEDURE — 76210000000 HC OR PH I REC 2 TO 2.5 HR: Performed by: ORTHOPAEDIC SURGERY

## 2021-12-17 PROCEDURE — 76060000034 HC ANESTHESIA 1.5 TO 2 HR: Performed by: ORTHOPAEDIC SURGERY

## 2021-12-17 PROCEDURE — 74011000250 HC RX REV CODE- 250: Performed by: PHYSICIAN ASSISTANT

## 2021-12-17 PROCEDURE — 74011250637 HC RX REV CODE- 250/637: Performed by: ORTHOPAEDIC SURGERY

## 2021-12-17 PROCEDURE — 77030006835 HC BLD SAW SAG STRY -B: Performed by: ORTHOPAEDIC SURGERY

## 2021-12-17 PROCEDURE — C1713 ANCHOR/SCREW BN/BN,TIS/BN: HCPCS | Performed by: ORTHOPAEDIC SURGERY

## 2021-12-17 PROCEDURE — 77030033067 HC SUT PDO STRATFX SPIR J&J -B: Performed by: ORTHOPAEDIC SURGERY

## 2021-12-17 PROCEDURE — 74011250636 HC RX REV CODE- 250/636: Performed by: ORTHOPAEDIC SURGERY

## 2021-12-17 PROCEDURE — 74011250637 HC RX REV CODE- 250/637: Performed by: PHYSICIAN ASSISTANT

## 2021-12-17 PROCEDURE — 77030031139 HC SUT VCRL2 J&J -A: Performed by: ORTHOPAEDIC SURGERY

## 2021-12-17 PROCEDURE — 77030035236 HC SUT PDS STRATFX BARB J&J -B: Performed by: ORTHOPAEDIC SURGERY

## 2021-12-17 PROCEDURE — 77030036638 HC ACC KT GPS KNE V2 EXAC -D: Performed by: ORTHOPAEDIC SURGERY

## 2021-12-17 PROCEDURE — 77030033138 HC SUT PGA STRATFX J&J -B: Performed by: ORTHOPAEDIC SURGERY

## 2021-12-17 DEVICE — COMPONENT TOT KNEE CAPPED K1 STD HEMI CEM: Type: IMPLANTABLE DEVICE | Status: FUNCTIONAL

## 2021-12-17 DEVICE — IMPLANTABLE DEVICE
Type: IMPLANTABLE DEVICE | Site: KNEE | Status: FUNCTIONAL
Brand: OPTETRAK LOGIC

## 2021-12-17 DEVICE — IMPLANTABLE DEVICE
Type: IMPLANTABLE DEVICE | Site: KNEE | Status: FUNCTIONAL
Brand: TRULIANT

## 2021-12-17 DEVICE — CEMENT BNE SIMPLEX W/O GENT -- PK/10 ONLY: Type: IMPLANTABLE DEVICE | Site: KNEE | Status: FUNCTIONAL

## 2021-12-17 DEVICE — IMPLANTABLE DEVICE
Type: IMPLANTABLE DEVICE | Site: KNEE | Status: FUNCTIONAL
Brand: OPTETRAK

## 2021-12-17 RX ORDER — NALOXONE HYDROCHLORIDE 0.4 MG/ML
0.4 INJECTION, SOLUTION INTRAMUSCULAR; INTRAVENOUS; SUBCUTANEOUS AS NEEDED
Status: DISCONTINUED | OUTPATIENT
Start: 2021-12-17 | End: 2021-12-20 | Stop reason: HOSPADM

## 2021-12-17 RX ORDER — ROPIVACAINE HYDROCHLORIDE 5 MG/ML
INJECTION, SOLUTION EPIDURAL; INFILTRATION; PERINEURAL AS NEEDED
Status: DISCONTINUED | OUTPATIENT
Start: 2021-12-17 | End: 2021-12-17 | Stop reason: HOSPADM

## 2021-12-17 RX ORDER — HYDROMORPHONE HYDROCHLORIDE 1 MG/ML
0.2 INJECTION, SOLUTION INTRAMUSCULAR; INTRAVENOUS; SUBCUTANEOUS
Status: DISCONTINUED | OUTPATIENT
Start: 2021-12-17 | End: 2021-12-17 | Stop reason: HOSPADM

## 2021-12-17 RX ORDER — POLYETHYLENE GLYCOL 3350 17 G/17G
17 POWDER, FOR SOLUTION ORAL DAILY
Status: DISCONTINUED | OUTPATIENT
Start: 2021-12-18 | End: 2021-12-20 | Stop reason: HOSPADM

## 2021-12-17 RX ORDER — SUCCINYLCHOLINE CHLORIDE 20 MG/ML
INJECTION INTRAMUSCULAR; INTRAVENOUS AS NEEDED
Status: DISCONTINUED | OUTPATIENT
Start: 2021-12-17 | End: 2021-12-17 | Stop reason: HOSPADM

## 2021-12-17 RX ORDER — OXYCODONE HYDROCHLORIDE 5 MG/1
10 TABLET ORAL
Status: DISCONTINUED | OUTPATIENT
Start: 2021-12-17 | End: 2021-12-20 | Stop reason: HOSPADM

## 2021-12-17 RX ORDER — ROCURONIUM BROMIDE 10 MG/ML
INJECTION, SOLUTION INTRAVENOUS AS NEEDED
Status: DISCONTINUED | OUTPATIENT
Start: 2021-12-17 | End: 2021-12-17 | Stop reason: HOSPADM

## 2021-12-17 RX ORDER — SODIUM CHLORIDE 0.9 % (FLUSH) 0.9 %
5-40 SYRINGE (ML) INJECTION EVERY 8 HOURS
Status: DISCONTINUED | OUTPATIENT
Start: 2021-12-17 | End: 2021-12-17 | Stop reason: HOSPADM

## 2021-12-17 RX ORDER — VANCOMYCIN/0.9 % SOD CHLORIDE 1.5G/250ML
1500 PLASTIC BAG, INJECTION (ML) INTRAVENOUS ONCE
Status: COMPLETED | OUTPATIENT
Start: 2021-12-17 | End: 2021-12-17

## 2021-12-17 RX ORDER — LIDOCAINE HYDROCHLORIDE 10 MG/ML
0.1 INJECTION, SOLUTION EPIDURAL; INFILTRATION; INTRACAUDAL; PERINEURAL AS NEEDED
Status: DISCONTINUED | OUTPATIENT
Start: 2021-12-17 | End: 2021-12-17 | Stop reason: HOSPADM

## 2021-12-17 RX ORDER — LEVOTHYROXINE SODIUM 25 UG/1
25 TABLET ORAL
Status: DISCONTINUED | OUTPATIENT
Start: 2021-12-18 | End: 2021-12-20 | Stop reason: HOSPADM

## 2021-12-17 RX ORDER — ASPIRIN 81 MG/1
81 TABLET ORAL 2 TIMES DAILY
Status: DISCONTINUED | OUTPATIENT
Start: 2021-12-17 | End: 2021-12-20 | Stop reason: HOSPADM

## 2021-12-17 RX ORDER — SODIUM CHLORIDE, SODIUM LACTATE, POTASSIUM CHLORIDE, CALCIUM CHLORIDE 600; 310; 30; 20 MG/100ML; MG/100ML; MG/100ML; MG/100ML
25 INJECTION, SOLUTION INTRAVENOUS CONTINUOUS
Status: DISCONTINUED | OUTPATIENT
Start: 2021-12-17 | End: 2021-12-17 | Stop reason: HOSPADM

## 2021-12-17 RX ORDER — FENTANYL CITRATE 50 UG/ML
INJECTION, SOLUTION INTRAMUSCULAR; INTRAVENOUS AS NEEDED
Status: DISCONTINUED | OUTPATIENT
Start: 2021-12-17 | End: 2021-12-17 | Stop reason: HOSPADM

## 2021-12-17 RX ORDER — ONDANSETRON 2 MG/ML
4 INJECTION INTRAMUSCULAR; INTRAVENOUS
Status: ACTIVE | OUTPATIENT
Start: 2021-12-17 | End: 2021-12-18

## 2021-12-17 RX ORDER — CEFAZOLIN SODIUM/WATER 2 G/20 ML
SYRINGE (ML) INTRAVENOUS AS NEEDED
Status: DISCONTINUED | OUTPATIENT
Start: 2021-12-17 | End: 2021-12-17 | Stop reason: HOSPADM

## 2021-12-17 RX ORDER — AMOXICILLIN 250 MG
1 CAPSULE ORAL 2 TIMES DAILY
Status: DISCONTINUED | OUTPATIENT
Start: 2021-12-17 | End: 2021-12-20 | Stop reason: HOSPADM

## 2021-12-17 RX ORDER — SODIUM CHLORIDE 9 MG/ML
125 INJECTION, SOLUTION INTRAVENOUS CONTINUOUS
Status: DISCONTINUED | OUTPATIENT
Start: 2021-12-17 | End: 2021-12-18

## 2021-12-17 RX ORDER — ONDANSETRON 2 MG/ML
INJECTION INTRAMUSCULAR; INTRAVENOUS AS NEEDED
Status: DISCONTINUED | OUTPATIENT
Start: 2021-12-17 | End: 2021-12-17 | Stop reason: HOSPADM

## 2021-12-17 RX ORDER — DULOXETIN HYDROCHLORIDE 20 MG/1
20 CAPSULE, DELAYED RELEASE ORAL
Status: DISCONTINUED | OUTPATIENT
Start: 2021-12-17 | End: 2021-12-20 | Stop reason: HOSPADM

## 2021-12-17 RX ORDER — ONDANSETRON 4 MG/1
4 TABLET, ORALLY DISINTEGRATING ORAL
Status: DISCONTINUED | OUTPATIENT
Start: 2021-12-19 | End: 2021-12-20 | Stop reason: HOSPADM

## 2021-12-17 RX ORDER — ONDANSETRON 2 MG/ML
4 INJECTION INTRAMUSCULAR; INTRAVENOUS AS NEEDED
Status: DISCONTINUED | OUTPATIENT
Start: 2021-12-17 | End: 2021-12-17 | Stop reason: HOSPADM

## 2021-12-17 RX ORDER — MIDAZOLAM HYDROCHLORIDE 1 MG/ML
INJECTION, SOLUTION INTRAMUSCULAR; INTRAVENOUS
Status: COMPLETED | OUTPATIENT
Start: 2021-12-17 | End: 2021-12-17

## 2021-12-17 RX ORDER — OXYCODONE HYDROCHLORIDE 5 MG/1
5 TABLET ORAL
Status: DISCONTINUED | OUTPATIENT
Start: 2021-12-17 | End: 2021-12-20 | Stop reason: HOSPADM

## 2021-12-17 RX ORDER — ACETAMINOPHEN 325 MG/1
650 TABLET ORAL EVERY 6 HOURS
Status: DISCONTINUED | OUTPATIENT
Start: 2021-12-17 | End: 2021-12-20 | Stop reason: HOSPADM

## 2021-12-17 RX ORDER — LIDOCAINE HYDROCHLORIDE 20 MG/ML
INJECTION, SOLUTION EPIDURAL; INFILTRATION; INTRACAUDAL; PERINEURAL AS NEEDED
Status: DISCONTINUED | OUTPATIENT
Start: 2021-12-17 | End: 2021-12-17 | Stop reason: HOSPADM

## 2021-12-17 RX ORDER — FAMOTIDINE 20 MG/1
20 TABLET, FILM COATED ORAL 2 TIMES DAILY
Status: DISCONTINUED | OUTPATIENT
Start: 2021-12-17 | End: 2021-12-20 | Stop reason: HOSPADM

## 2021-12-17 RX ORDER — FUROSEMIDE 20 MG/1
20 TABLET ORAL DAILY
Status: DISCONTINUED | OUTPATIENT
Start: 2021-12-18 | End: 2021-12-20 | Stop reason: HOSPADM

## 2021-12-17 RX ORDER — DEXAMETHASONE SODIUM PHOSPHATE 4 MG/ML
10 INJECTION, SOLUTION INTRA-ARTICULAR; INTRALESIONAL; INTRAMUSCULAR; INTRAVENOUS; SOFT TISSUE ONCE
Status: COMPLETED | OUTPATIENT
Start: 2021-12-18 | End: 2021-12-18

## 2021-12-17 RX ORDER — SODIUM CHLORIDE 0.9 % (FLUSH) 0.9 %
5-40 SYRINGE (ML) INJECTION AS NEEDED
Status: DISCONTINUED | OUTPATIENT
Start: 2021-12-17 | End: 2021-12-20 | Stop reason: HOSPADM

## 2021-12-17 RX ORDER — FENTANYL CITRATE 50 UG/ML
25 INJECTION, SOLUTION INTRAMUSCULAR; INTRAVENOUS
Status: COMPLETED | OUTPATIENT
Start: 2021-12-17 | End: 2021-12-17

## 2021-12-17 RX ORDER — DEXAMETHASONE SODIUM PHOSPHATE 4 MG/ML
INJECTION, SOLUTION INTRA-ARTICULAR; INTRALESIONAL; INTRAMUSCULAR; INTRAVENOUS; SOFT TISSUE AS NEEDED
Status: DISCONTINUED | OUTPATIENT
Start: 2021-12-17 | End: 2021-12-17 | Stop reason: HOSPADM

## 2021-12-17 RX ORDER — ROPIVACAINE HYDROCHLORIDE 5 MG/ML
INJECTION, SOLUTION EPIDURAL; INFILTRATION; PERINEURAL
Status: COMPLETED | OUTPATIENT
Start: 2021-12-17 | End: 2021-12-17

## 2021-12-17 RX ORDER — ACETAMINOPHEN 500 MG
1000 TABLET ORAL ONCE
Status: COMPLETED | OUTPATIENT
Start: 2021-12-17 | End: 2021-12-17

## 2021-12-17 RX ORDER — MORPHINE SULFATE 2 MG/ML
2 INJECTION, SOLUTION INTRAMUSCULAR; INTRAVENOUS
Status: ACTIVE | OUTPATIENT
Start: 2021-12-17 | End: 2021-12-18

## 2021-12-17 RX ORDER — OXYCODONE HYDROCHLORIDE 5 MG/1
5 TABLET ORAL AS NEEDED
Status: DISCONTINUED | OUTPATIENT
Start: 2021-12-17 | End: 2021-12-17 | Stop reason: HOSPADM

## 2021-12-17 RX ORDER — METOPROLOL SUCCINATE 25 MG/1
25 TABLET, EXTENDED RELEASE ORAL DAILY
Status: DISCONTINUED | OUTPATIENT
Start: 2021-12-18 | End: 2021-12-20 | Stop reason: HOSPADM

## 2021-12-17 RX ORDER — PROPOFOL 10 MG/ML
INJECTION, EMULSION INTRAVENOUS AS NEEDED
Status: DISCONTINUED | OUTPATIENT
Start: 2021-12-17 | End: 2021-12-17 | Stop reason: HOSPADM

## 2021-12-17 RX ORDER — DIPHENHYDRAMINE HCL 12.5MG/5ML
12.5 LIQUID (ML) ORAL
Status: DISCONTINUED | OUTPATIENT
Start: 2021-12-17 | End: 2021-12-20 | Stop reason: HOSPADM

## 2021-12-17 RX ORDER — SODIUM CHLORIDE 0.9 % (FLUSH) 0.9 %
5-40 SYRINGE (ML) INJECTION EVERY 8 HOURS
Status: DISCONTINUED | OUTPATIENT
Start: 2021-12-17 | End: 2021-12-20 | Stop reason: HOSPADM

## 2021-12-17 RX ORDER — CELECOXIB 200 MG/1
400 CAPSULE ORAL ONCE
Status: COMPLETED | OUTPATIENT
Start: 2021-12-17 | End: 2021-12-17

## 2021-12-17 RX ORDER — FACIAL-BODY WIPES
10 EACH TOPICAL DAILY PRN
Status: DISCONTINUED | OUTPATIENT
Start: 2021-12-19 | End: 2021-12-20 | Stop reason: HOSPADM

## 2021-12-17 RX ORDER — AMLODIPINE BESYLATE 5 MG/1
5 TABLET ORAL DAILY
Status: DISCONTINUED | OUTPATIENT
Start: 2021-12-18 | End: 2021-12-20 | Stop reason: HOSPADM

## 2021-12-17 RX ADMIN — SUCCINYLCHOLINE CHLORIDE 140 MG: 20 INJECTION, SOLUTION INTRAMUSCULAR; INTRAVENOUS at 11:29

## 2021-12-17 RX ADMIN — FENTANYL CITRATE 50 MCG: 50 INJECTION, SOLUTION INTRAMUSCULAR; INTRAVENOUS at 11:38

## 2021-12-17 RX ADMIN — VANCOMYCIN HYDROCHLORIDE 1500 MG: 10 INJECTION, POWDER, LYOPHILIZED, FOR SOLUTION INTRAVENOUS at 11:01

## 2021-12-17 RX ADMIN — FENTANYL CITRATE 50 MCG: 50 INJECTION, SOLUTION INTRAMUSCULAR; INTRAVENOUS at 11:45

## 2021-12-17 RX ADMIN — FAMOTIDINE 20 MG: 20 TABLET ORAL at 17:21

## 2021-12-17 RX ADMIN — ONDANSETRON HYDROCHLORIDE 4 MG: 2 INJECTION, SOLUTION INTRAMUSCULAR; INTRAVENOUS at 11:52

## 2021-12-17 RX ADMIN — DULOXETINE HYDROCHLORIDE 20 MG: 20 CAPSULE, DELAYED RELEASE ORAL at 22:55

## 2021-12-17 RX ADMIN — ACETAMINOPHEN 650 MG: 325 TABLET ORAL at 23:00

## 2021-12-17 RX ADMIN — HYDROMORPHONE HYDROCHLORIDE 0.2 MG: 1 INJECTION, SOLUTION INTRAMUSCULAR; INTRAVENOUS; SUBCUTANEOUS at 14:00

## 2021-12-17 RX ADMIN — ROCURONIUM BROMIDE 5 MG: 10 INJECTION INTRAVENOUS at 11:28

## 2021-12-17 RX ADMIN — FENTANYL CITRATE 25 MCG: 50 INJECTION, SOLUTION INTRAMUSCULAR; INTRAVENOUS at 13:40

## 2021-12-17 RX ADMIN — ROPIVACAINE HYDROCHLORIDE 20 ML: 5 INJECTION, SOLUTION EPIDURAL; INFILTRATION; PERINEURAL at 11:15

## 2021-12-17 RX ADMIN — DEXAMETHASONE SODIUM PHOSPHATE 4 MG: 4 INJECTION, SOLUTION INTRAMUSCULAR; INTRAVENOUS at 11:33

## 2021-12-17 RX ADMIN — WATER 2 G: 1 INJECTION INTRAMUSCULAR; INTRAVENOUS; SUBCUTANEOUS at 18:02

## 2021-12-17 RX ADMIN — ACETAMINOPHEN 650 MG: 325 TABLET ORAL at 17:20

## 2021-12-17 RX ADMIN — SODIUM CHLORIDE, POTASSIUM CHLORIDE, SODIUM LACTATE AND CALCIUM CHLORIDE: 600; 310; 30; 20 INJECTION, SOLUTION INTRAVENOUS at 10:57

## 2021-12-17 RX ADMIN — LIDOCAINE HYDROCHLORIDE 40 MG: 20 INJECTION, SOLUTION EPIDURAL; INFILTRATION; INTRACAUDAL; PERINEURAL at 11:28

## 2021-12-17 RX ADMIN — Medication 1000 MG: at 10:59

## 2021-12-17 RX ADMIN — FENTANYL CITRATE 25 MCG: 50 INJECTION, SOLUTION INTRAMUSCULAR; INTRAVENOUS at 13:50

## 2021-12-17 RX ADMIN — Medication 10 ML: at 17:21

## 2021-12-17 RX ADMIN — FENTANYL CITRATE 25 MCG: 50 INJECTION, SOLUTION INTRAMUSCULAR; INTRAVENOUS at 14:00

## 2021-12-17 RX ADMIN — HYDROMORPHONE HYDROCHLORIDE 0.2 MG: 1 INJECTION, SOLUTION INTRAMUSCULAR; INTRAVENOUS; SUBCUTANEOUS at 13:15

## 2021-12-17 RX ADMIN — FENTANYL CITRATE 25 MCG: 50 INJECTION, SOLUTION INTRAMUSCULAR; INTRAVENOUS at 13:30

## 2021-12-17 RX ADMIN — PROPOFOL 80 MG: 10 INJECTION, EMULSION INTRAVENOUS at 11:28

## 2021-12-17 RX ADMIN — HYDROMORPHONE HYDROCHLORIDE 0.2 MG: 1 INJECTION, SOLUTION INTRAMUSCULAR; INTRAVENOUS; SUBCUTANEOUS at 13:30

## 2021-12-17 RX ADMIN — Medication 2 G: at 11:33

## 2021-12-17 RX ADMIN — DOCUSATE SODIUM 50MG AND SENNOSIDES 8.6MG 1 TABLET: 8.6; 5 TABLET, FILM COATED ORAL at 17:19

## 2021-12-17 RX ADMIN — Medication 3 AMPULE: at 10:59

## 2021-12-17 RX ADMIN — SODIUM CHLORIDE 125 ML/HR: 0.9 INJECTION, SOLUTION INTRAVENOUS at 13:21

## 2021-12-17 RX ADMIN — CELECOXIB 400 MG: 200 CAPSULE ORAL at 10:59

## 2021-12-17 RX ADMIN — Medication 10 ML: at 22:55

## 2021-12-17 RX ADMIN — HYDROMORPHONE HYDROCHLORIDE 0.2 MG: 1 INJECTION, SOLUTION INTRAMUSCULAR; INTRAVENOUS; SUBCUTANEOUS at 13:45

## 2021-12-17 RX ADMIN — HYDROMORPHONE HYDROCHLORIDE 0.2 MG: 1 INJECTION, SOLUTION INTRAMUSCULAR; INTRAVENOUS; SUBCUTANEOUS at 15:00

## 2021-12-17 RX ADMIN — ASPIRIN 81 MG: 81 TABLET, COATED ORAL at 17:20

## 2021-12-17 RX ADMIN — MIDAZOLAM HYDROCHLORIDE 1 MG: 1 INJECTION, SOLUTION INTRAMUSCULAR; INTRAVENOUS at 11:15

## 2021-12-17 NOTE — H&P
Date of Surgery Update:  Cyn Best was seen and examined on the day of surgery prior to the procedure. There were no significant clinical changes since the completion of the History and Physical.    Exam today prior to surgery showed no acute cardiac findings, no respiratory difficulty, and no abdominal complaints or pain. This patient is a candidate for TXA. The patient was counseled at length about the risks of suni Covid-19 during their perioperative period and any recovery window from their procedure. The patient was made aware that suni Covid-19  may worsen their prognosis for recovering from their procedure and lend to a higher morbidity and/or mortality risk. All material risks, benefits, and reasonable alternatives including postponing the procedure were discussed. The patient does wish to proceed with the procedure at this time. Documentation of Medical Necessity:    Symptoms: pain with activity and at rest, antalgia, interferes with ADLs    Conservative Treatment: activity modification, multiple medications, injection    Physical Findings: varus, pain at joint line, antalgia on ambulation, crepitation    Imaging: significant OA, sclerosis and osteophytes, varus    Indications:   Failure of conservative treatments with daily pain and functional limitations. Appropriate imaging demonstrating significant disease. Appropriate physical findings consistent with significant degenerative joint disease. All pertinent risks, benefits, and alternatives to operative management including continued conservative care were explained at length. The patient has elected to proceed with appropriately indicated and medically necessary total joint arthroplasty. They understand no guarantees can be given about the outcome.         Signed By: CLAUDE Leigh     December 17, 2021 7:30 AM

## 2021-12-17 NOTE — ANESTHESIA PREPROCEDURE EVALUATION
Anesthetic History          Comments: Prolonged emergence         Review of Systems / Medical History  Patient summary reviewed, nursing notes reviewed and pertinent labs reviewed    Pulmonary              Pertinent negatives: No COPD and asthma     Neuro/Psych       CVA: no residual symptoms  TIA     Cardiovascular    Hypertension: well controlled            Pertinent negatives: No past MI and CHF  Exercise tolerance: >4 METS  Comments: TTE (2020)  · LV: Estimated LVEF is 55 - 60%. Mild (grade 1) left ventricular diastolic dysfunction. .  Mild aortic valve stenosis is present. Mild aortic valve regurgitation is present.   Mild mitral valve regurgitation  Mild tricuspid valve regurgitation  Mild pulmonary hypertension    EKG (11/2021): LVH, LAFB       GI/Hepatic/Renal     GERD: well controlled          Comments: Diverticulosis Endo/Other      Hypothyroidism  Obesity and arthritis     Other Findings   Comments: Chronic pain  DJD   DDD          Physical Exam    Airway  Mallampati: II  TM Distance: 4 - 6 cm  Neck ROM: normal range of motion   Mouth opening: Normal     Cardiovascular  Regular rate and rhythm,  S1 and S2 normal,  no murmur, click, rub, or gallop  Rhythm: regular  Rate: normal         Dental  No notable dental hx       Pulmonary  Breath sounds clear to auscultation               Abdominal  GI exam deferred       Other Findings            Anesthetic Plan    ASA: 3  Anesthesia type: general      Post-op pain plan if not by surgeon: peripheral nerve block single    Induction: Intravenous  Anesthetic plan and risks discussed with: Patient

## 2021-12-17 NOTE — PROGRESS NOTES
Problem: Pressure Injury - Risk of  Goal: *Prevention of pressure injury  Description: Document Noe Scale and appropriate interventions in the flowsheet. Outcome: Progressing Towards Goal  Note: Pressure Injury Interventions:  Sensory Interventions: Assess changes in LOC    Moisture Interventions: Minimize layers    Activity Interventions: Increase time out of bed    Mobility Interventions: Float heels    Nutrition Interventions: Document food/fluid/supplement intake    Friction and Shear Interventions: Minimize layers                Problem: Patient Education: Go to Patient Education Activity  Goal: Patient/Family Education  Outcome: Progressing Towards Goal     Problem: Falls - Risk of  Goal: *Absence of Falls  Description: Document Purvi Fall Risk and appropriate interventions in the flowsheet.   Outcome: Progressing Towards Goal  Note: Fall Risk Interventions:  Mobility Interventions: Communicate number of staff needed for ambulation/transfer         Medication Interventions: Patient to call before getting OOB,Teach patient to arise slowly    Elimination Interventions: Call light in reach    History of Falls Interventions: Consult care management for discharge planning         Problem: Patient Education: Go to Patient Education Activity  Goal: Patient/Family Education  Outcome: Progressing Towards Goal     Problem: Infection - Risk of, Surgical Site Infection  Goal: *Absence of surgical site infection signs and symptoms  Outcome: Progressing Towards Goal     Problem: Patient Education: Go to Patient Education Activity  Goal: Patient/Family Education  Outcome: Progressing Towards Goal

## 2021-12-17 NOTE — DISCHARGE INSTRUCTIONS
Discharge Instructions: How to [facility]  Surgery: Total Knee Replacement  Surgeon:   Annmarie Ovalle MD  Surgery Date:  12/17/2021     To relieve pain:   Use ice/gel packs.  Put the ice pack directly over the wound, or anywhere you are hurting or swollen.  To control pain and swelling, keep ice on regularly, especially after physical activity.  The packs should stay cold for 3-4 hours. When it is not cold anymore, rotate with the packs in the freezer.  Elevate your leg. This will also keep swelling down.  Rest for at least 20 minutes between activity or exercises.  To keep track of your pain medications, write down what you take and when you take it.  The last dose of pain medication you got in the hospital was:       Medication    Dose    Date & Time           Choose your medications based on the pain scale below:     To keep your pain under control, take Tylenol every 6 hours for 14 days - even if you feel like you dont need it.  For mild to moderate pain (4-6 on pain scale), take one pain pill every 3-4 hours as needed.  For severe pain (7-10 on pain scale), take two pain pills every 3-4 hours as needed.  To prevent nausea, take your pain medications with food. Pain Scale                 As your pain lessens:     Slowly start taking less pain medication. You may do this by waiting longer between doses or by taking smaller doses.  Stop using the pain medications as soon as you no longer need it, usually in 2-3 weeks.  Aspirin   To prevent blood clots, you will need to take Aspirin 81 mg twice a day for 30 days.  To prevent stomach upset or bleeding:   Do not take non-steroidal anti-inflammatory medications (Ibuprofen, Advil, Motrin, Naproxen, etc.)    Take Pepcid 20 mg twice a day, or a similar home medication, while you are taking a blood thinner.  You will have some swelling, warmth, and bruising around the knee and up and down the leg after surgery. This will may get worse in the first few days you are home and will slowly get better over the next few weeks. PRIMASEAL DRESSING INSTRUCTIONS           Leave this covering alone. Do not peel it off.  Do not apply oils or lotions over it.  You may shower with this dressing but do not soak it. Gently pat your wound dry when you get out of the shower. DO NOTs:   Do not rub your surgical wound   Do not put lotion or oils on your wound.  Do not take a tub bath or go swimming until your doctor says it is ok.  You may shower with this dressing over your wound.  After showering pat the dressing dry.  If you have staples a home health nurse will remove them in about 10 days.  To increase and promote healing:   Stop Smoking (or at least cut back on       Smoking).  Eat a well-balanced diet (high in protein       and vitamin C).  If you have a poor appetite, drink Ensure, Glucerna, or         [place] Instant Breakfast for the next 30 days.  If you are diabetic, you should control your blood         sugars to prevent infection and help your wound         to heal.                     f you have a poor appetite, drink Ensure, Glucerna, or [place] Instant Breakfast for the next 30 days.  If you are diabetic, you should control your blood                                                sugars to prevent infection and help your wound                                                to heal.     Prevent Infection    1. Wash your hands.  This is the most important thing you or your caregiver can do.  Wash your hands with soap and water (or use the hand  we gave you) before you touch any wounds. 2. Shower.  Use the antibacterial soap we gave you when you take a shower.       Shower with this soap until your wounds are healed. 3.  Use clean sheets.  Put freshly cleaned sheets on your bed after surgery.  To keep the surgery site clean, do not allow pets to sleep with you while your wound is still healing.  To prevent constipation, stay active and drink plenty of fluid.  While using pain medications, you should also take stool softeners and laxatives, such as Pericolace       and Miralax.  If you are having too many bowel       Movements, then you may need       to stop taking the laxatives.  You should have a bowel movement 3-4 days        after surgery and then at least every other day while       on pain medication.  To improve your recovery, you must be active!  Use your walker and take short walks (in your home)         about every 2 hours during the day.  Try to increase how far you walk each day.  You can put as much weight on your leg as you can tolerate while walking.  To avoid getting a stiff knee, work on getting your knee bent and straight as soon as possible.  Home health physical therapy will come to your       home the day after you leave the hospital.  The       therapist will visit about 4 times over the next        couple of weeks to teach you how to get out of        bed, to safely walk in your home, and to do your        exercises.  NO DRIVING until your surgeon tells you it is ok.  You can return to work when cleared by a physician.  Please call your physician immediately if you have:     Constant bleeding from your wound.  Increasing redness or swelling around your wound (Some warmth, bruising and swelling is normal).  Change in wound drainage (increase in amount, color, or bad smell).      Change in mental status (unusual behavior   Temperature over 101.5 degrees Fahrenheit      Pain or redness in the calf (back of your lower leg - see picture)     Increased swelling of the thigh, ankle, calf, or foot.  Emergency: CALL 911 if you have:     Shortness of breath     Chest pain when you cough or taking a deep breath     Please call your surgeons office at [phone]  for a follow up appointment. _   You should call as soon as you get home or the next day after discharge. Ask to make an appointment in 2 weeks.  If you have questions or concerns during normal business hours, you may reach Dr. Deandra Smith' Team at [phone].

## 2021-12-17 NOTE — PROGRESS NOTES
TRANSFER - IN REPORT:    Verbal report received from Alyx Almaraz RN(name) on Rachel Ho  being received from [facility]) for routine post - op      Report consisted of patients Situation, Background, Assessment and   Recommendations(SBAR). Information from the following report(s) SBAR, Kardex, Intake/Output, MAR and Recent Results was reviewed with the receiving nurse. Opportunity for questions and clarification was provided. Assessment completed upon patients arrival to unit and care assumed. End of Shift Note    Bedside shift change report given to Argelia Villalta LPN (oncoming nurse) by Vashti Ulrich RN (offgoing nurse). Report included the following information SBAR, Kardex, Intake/Output, MAR and Recent Results    Shift worked:  Day     Shift summary and any significant changes:    Patient has had higher SBPs in the 150s, patient has been voiding and tolerating diet/medication. Concerns for physician to address:  n/a     Zone phone for oncoming shift:  [phone]     Activity:  Activity Level: Up with Assistance  Number times ambulated in hallways past shift: 0  Number of times OOB to chair past shift: 0    Cardiac:   Cardiac Monitoring: No      Cardiac Rhythm: Sinus Rhythm    Access:   Current line(s): PIV     Genitourinary:   Urinary status: voiding and external catheter    Respiratory:   O2 Device: Nasal cannula  Chronic home O2 use?: NO  Incentive spirometer at bedside: YES     GI:     Current diet:  ADULT DIET Regular  Passing flatus: YES  Tolerating current diet: YES       Pain Management:   Patient states pain is manageable on current regimen: YES    Skin:  Noe Score: 17  Interventions: float heels and increase time out of bed    Patient Safety:  Fall Score:  Total Score: 3  Interventions: gripper socks and pt to call before getting OOB  High Fall Risk: Yes    Length of Stay:  Expected LOS: - - -  Actual LOS: 0      Vashti Ulrich RN

## 2021-12-17 NOTE — PERIOP NOTES
TRANSFER - IN REPORT:    Verbal report received from FRANTZ Gibson RN & RAMAN Mejia CRNA(name) on Aura Margo  being received from OR(unit) for routine post - op      Report consisted of patients Situation, Background, Assessment and   Recommendations(SBAR). Information from the following report(s) OR Summary was reviewed with the receiving nurse. Opportunity for questions and clarification was provided. Assessment completed upon patients arrival to unit and care assumed.

## 2021-12-17 NOTE — PERIOP NOTES
TRANSFER - OUT REPORT:    Verbal report given to Naheed Lee RN(name) on [facility]  being transferred to [facility](unit) for routine progression of care       Report consisted of patients Situation, Background, Assessment and   Recommendations(SBAR). Information from the following report(s) OR Summary, Intake/Output and MAR was reviewed with the receiving nurse. Opportunity for questions and clarification was provided.       Patient transported with:   O2 @ 2 liters

## 2021-12-17 NOTE — ANESTHESIA PROCEDURE NOTES
Peripheral Block    Start time: 12/17/2021 11:10 AM  End time: 12/17/2021 11:15 AM  Performed by: Christal Padilla MD  Authorized by: Christal Padilla MD       Pre-procedure: Indications: at surgeon's request and post-op pain management    Preanesthetic Checklist: patient identified, risks and benefits discussed, site marked, timeout performed, anesthesia consent given and patient being monitored    Timeout Time: 11:10 EST          Block Type:   Block Type:   Adductor canal block  Laterality:  Right  Monitoring:  Standard ASA monitoring, continuous pulse ox, frequent vital sign checks, heart rate, responsive to questions and oxygen  Injection Technique:  Single shot  Procedures: ultrasound guided    Patient Position: supine  Prep: chlorhexidine    Location:  Lower thigh  Needle Type:  Stimuplex  Needle Gauge:  22 G  Needle Localization:  Ultrasound guidance  Medication Injected:  Midazolam (VERSED) injection, 1 mg  ropivacaine (PF) (NAROPIN)(0.5%) 5 mg/mL injection, 20 mL  Med Admin Time: 12/17/2021 11:15 AM    Assessment:  Number of attempts:  1  Injection Assessment:  Incremental injection every 5 mL, local visualized surrounding nerve on ultrasound, negative aspiration for blood, no intravascular symptoms, no paresthesia and ultrasound image on chart  Patient tolerance:  Patient tolerated the procedure well with no immediate complications

## 2021-12-17 NOTE — ANESTHESIA POSTPROCEDURE EVALUATION
Procedure(s):  RIGHT TOTAL KNEE ARTHROPLASTY WITH NAVIGATION. general    Anesthesia Post Evaluation      Multimodal analgesia: multimodal analgesia used between 6 hours prior to anesthesia start to PACU discharge  Patient location during evaluation: PACU  Patient participation: complete - patient participated  Level of consciousness: sleepy but conscious  Pain management: adequate  Airway patency: patent  Anesthetic complications: no  Cardiovascular status: acceptable, hemodynamically stable and blood pressure returned to baseline  Respiratory status: acceptable and nasal cannula  Hydration status: euvolemic  Post anesthesia nausea and vomiting:  none  Final Post Anesthesia Temperature Assessment:  Normothermia (36.0-37.5 degrees C)      INITIAL Post-op Vital signs:   Vitals Value Taken Time   /63 12/17/21 1415   Temp 36.7 °C (98 °F) 12/17/21 1301   Pulse 79 12/17/21 1419   Resp 19 12/17/21 1419   SpO2 95 % 12/17/21 1419   Vitals shown include unvalidated device data.

## 2021-12-17 NOTE — PERIOP NOTES
covid test negative   Took covid vaccine    x3    Wears mask in public. No s/s covid virus nor has she been around anyone with the covid virus that she knows of.   sally completed. mepilex sacrum border preventatively applied  Skin intact. Pt   Provided with disposable underwear and pad As she has frequency voiding. Voided in holding area. Pt noted to have scabs to both arms and hands. Legs greatly edematous   Dr. Nino Solorzano aware he will  Consult her   Kidney md.   Surgery to proceed. Pt has scabbed area to posterior of right lower leg states banged leg on car door. Pt blocked in holding area. See anesthesia sheet. nsasl 2 liters in place and pt on  Monitor with sr on the scope. Versed 1cc given by dr. [place] Alas he has remainder of medication.

## 2021-12-17 NOTE — OP NOTES
Eladia Schmidt MD - Adult Reconstruction and Total Joint Replacement    Riya Morejon - MRN [id number] - : 1945 (78 y.o.)    Date: 2021    PREOPERATIVE DIAGNOSIS:  Right knee degenerative joint disease    POSTOPERATIVE DIAGNOSIS:  Same    PROCEDURE: Total knee replacement with imageless computer navigation    Implants Used:   Implant Name Type Inv.  Item Serial No.  Lot No. LRB No. Used Action   CEMENT BNE SIMPLEX W/O GENT -- PK/10 ONLY - SNA  CEMENT BNE SIMPLEX W/O GENT -- PK/10 ONLY NA [employer] [id number] Right 1 Implanted   COMPONENT FEM SZ 2.5 RT CRUC RET GAIL TRULIANT - [id number]  COMPONENT FEM SZ 2.5 RT CRUC RET GAIL TRULIANT [id number] [employer] [id number] Right 1 Implanted   SCREW ATTCH TIB STEM EXACTECH - [id number]  SCREW ATTCH TIB STEM EXACTECH [id number] [employer] [id number] Right 1 Implanted   STEM EXTN 14MM ARIC 40MM SIMIN TIB STABILIZING CLEMENTINE Y KNEE FEM - [id number]  STEM EXTN 14MM ARIC 40MM SIMIN TIB STABILIZING CLEMENTINE Y KNEE FEM [id number] [employer] [id number] Right 1 Implanted   COMPONENT TIB GAIL FIT 2.5F 2.5T LOGIC - [id number]  COMPONENT TIB GAIL FIT 2.5F 2.5T LOGIC [id number] [employer] [id number] Right 1 Implanted   INSERT TIB CR 2.5 11 MM TRULIANT - [id number]  INSERT TIB CR 2.5 11 MM TRULIANT [id number] [employer] [id number] Right 1 Implanted       Anesthesia: General + block / local    Pre-operative antibiotic: Ancef    Surgeon: Eladia Schmidt     Assist: CLAUDE Olvera (Performing all or most of the following assistant-at-surgery services including but not limited to: proper patient positioning, sterile/prep and draping, placement of instruments/trackers, operative exposure, minor portions of bone / soft tissue excision, final irrigation and debridement, deep and superficial closure, application of final dressings)    EBL: minimal    Drains: none     Specimens: none     Complications: none     Condition: stable to PACU     INDICATIONS: Longstanding knee pain unresponsive to conservative measures. The risks, benefits, and alternatives to the procedure were explained to the patient and they wished to proceed. They understood no guarantees could be given about the outcome of the procedure. DESCRIPTION OF PROCEDURE:  The patient was brought in to the operating room and placed supine on a standard OR table. Anesthesia was provided by the anesthesia team without difficulty. A thigh tourniquet was applied to the operative limb which was then prepped and draped in the usual fashion. Pre-operative antibiotics were administered. An appropriate time-out was performed. The limb was exsanguinated with an Esmarch and tourniquet inflated. A standard medial parapatellar arthrotomy was used. The fat pad was excised. The patella was then subluxed laterally and attention turned to the femur. Navigation was used after the registration sequence to make the appropriate distal femoral cut, and drill for the lugs of the 4-in-1 guide. The femoral cut was confirmed with navigation. The cruciate ligaments were excised along with the anterior portions of the menisci. The  4-in-1 guide position was confirmed with navigation and pinned in parallel to the epicondylar axis and perpendicular to [place]'s line. The anterior, posterior and chamfer cuts were performed, protecting the collateral ligaments at all times. The posterior capsule was cleared and any osteophytes were removed. Attention was then turned to the tibia. The navigation system was used to perform the tibial cut perpendicular to the mechanical axis. The remainder of the menisci were excised and the tibia sized. The patella was noted to be in acceptable condition and was not resurfaced. Selective denervation was performed. Trial components were then placed and the knee taken through a range of motion and found to have excellent range of motion, stability, and ligamentous balance.  We drilled for the lugs of the femoral component. The rotation of the tibial tray was marked and the trials removed. The trial tibial tray was reinserted and the tibial prepared for the keel of the tray and a short stem. All cut surfaces were thoroughly lavaged and dried. The final implants were then cemented into place and excess cement removed with a curette. The tibial tray liner was inserted into the locking mechanism and the knee reduced. It was again taken through a range of motion and found to be stable in all planes with excellent tracking of the patella. The wound was thoroughly lavaged again. The extensor mechanism was repaired with heavy interrupted suture and a running stitch. Periarticular injection was performed. Skin closure was then performed in layers. Sterile compressive dressings were applied. The patient was awakened, moved to the stretcher and taken to the recovery room in stable condition. At the conclusion of the procedure, all counts were correct. There no immediate complications.

## 2021-12-18 LAB
ANION GAP SERPL CALC-SCNC: 7 MMOL/L (ref 5–15)
BUN SERPL-MCNC: 19 MG/DL (ref 6–20)
BUN/CREAT SERPL: 22 (ref 12–20)
CALCIUM SERPL-MCNC: 8.3 MG/DL (ref 8.5–10.1)
CHLORIDE SERPL-SCNC: 108 MMOL/L (ref 97–108)
CO2 SERPL-SCNC: 24 MMOL/L (ref 21–32)
CREAT SERPL-MCNC: 0.87 MG/DL (ref 0.55–1.02)
GLUCOSE SERPL-MCNC: 111 MG/DL (ref 65–100)
HGB BLD-MCNC: 11.5 G/DL (ref 11.5–16)
POTASSIUM SERPL-SCNC: 3.8 MMOL/L (ref 3.5–5.1)
SODIUM SERPL-SCNC: 139 MMOL/L (ref 136–145)

## 2021-12-18 PROCEDURE — 97161 PT EVAL LOW COMPLEX 20 MIN: CPT | Performed by: PHYSICAL THERAPIST

## 2021-12-18 PROCEDURE — 77010033678 HC OXYGEN DAILY

## 2021-12-18 PROCEDURE — 74011250636 HC RX REV CODE- 250/636: Performed by: PHYSICIAN ASSISTANT

## 2021-12-18 PROCEDURE — 36415 COLL VENOUS BLD VENIPUNCTURE: CPT

## 2021-12-18 PROCEDURE — 80048 BASIC METABOLIC PNL TOTAL CA: CPT

## 2021-12-18 PROCEDURE — 74011000250 HC RX REV CODE- 250: Performed by: PHYSICIAN ASSISTANT

## 2021-12-18 PROCEDURE — 85018 HEMOGLOBIN: CPT

## 2021-12-18 PROCEDURE — 97110 THERAPEUTIC EXERCISES: CPT | Performed by: PHYSICAL THERAPIST

## 2021-12-18 PROCEDURE — 74011250637 HC RX REV CODE- 250/637: Performed by: PHYSICIAN ASSISTANT

## 2021-12-18 PROCEDURE — 94760 N-INVAS EAR/PLS OXIMETRY 1: CPT

## 2021-12-18 PROCEDURE — 65270000029 HC RM PRIVATE

## 2021-12-18 PROCEDURE — 97116 GAIT TRAINING THERAPY: CPT | Performed by: PHYSICAL THERAPIST

## 2021-12-18 PROCEDURE — 97530 THERAPEUTIC ACTIVITIES: CPT | Performed by: PHYSICAL THERAPIST

## 2021-12-18 RX ADMIN — OXYCODONE 10 MG: 5 TABLET ORAL at 22:02

## 2021-12-18 RX ADMIN — ACETAMINOPHEN 650 MG: 325 TABLET ORAL at 17:41

## 2021-12-18 RX ADMIN — OXYCODONE 10 MG: 5 TABLET ORAL at 17:42

## 2021-12-18 RX ADMIN — ACETAMINOPHEN 650 MG: 325 TABLET ORAL at 11:33

## 2021-12-18 RX ADMIN — OXYCODONE 5 MG: 5 TABLET ORAL at 09:28

## 2021-12-18 RX ADMIN — DOCUSATE SODIUM 50MG AND SENNOSIDES 8.6MG 1 TABLET: 8.6; 5 TABLET, FILM COATED ORAL at 17:41

## 2021-12-18 RX ADMIN — Medication 10 ML: at 09:32

## 2021-12-18 RX ADMIN — FAMOTIDINE 20 MG: 20 TABLET ORAL at 17:42

## 2021-12-18 RX ADMIN — OXYCODONE 10 MG: 5 TABLET ORAL at 12:01

## 2021-12-18 RX ADMIN — AMLODIPINE BESYLATE 5 MG: 5 TABLET ORAL at 09:28

## 2021-12-18 RX ADMIN — FUROSEMIDE 20 MG: 20 TABLET ORAL at 09:28

## 2021-12-18 RX ADMIN — DEXAMETHASONE SODIUM PHOSPHATE 10 MG: 4 INJECTION, SOLUTION INTRAMUSCULAR; INTRAVENOUS at 09:29

## 2021-12-18 RX ADMIN — ASPIRIN 81 MG: 81 TABLET, COATED ORAL at 09:28

## 2021-12-18 RX ADMIN — METOPROLOL SUCCINATE 25 MG: 25 TABLET, EXTENDED RELEASE ORAL at 09:28

## 2021-12-18 RX ADMIN — FAMOTIDINE 20 MG: 20 TABLET ORAL at 09:28

## 2021-12-18 RX ADMIN — Medication 10 ML: at 05:44

## 2021-12-18 RX ADMIN — OXYCODONE 10 MG: 5 TABLET ORAL at 03:29

## 2021-12-18 RX ADMIN — ASPIRIN 81 MG: 81 TABLET, COATED ORAL at 17:41

## 2021-12-18 RX ADMIN — LEVOTHYROXINE SODIUM 25 MCG: 0.03 TABLET ORAL at 09:28

## 2021-12-18 RX ADMIN — POLYETHYLENE GLYCOL 3350 17 G: 17 POWDER, FOR SOLUTION ORAL at 09:29

## 2021-12-18 RX ADMIN — Medication 10 ML: at 22:03

## 2021-12-18 RX ADMIN — WATER 2 G: 1 INJECTION INTRAMUSCULAR; INTRAVENOUS; SUBCUTANEOUS at 02:29

## 2021-12-18 RX ADMIN — ACETAMINOPHEN 650 MG: 325 TABLET ORAL at 05:44

## 2021-12-18 RX ADMIN — DULOXETINE HYDROCHLORIDE 20 MG: 20 CAPSULE, DELAYED RELEASE ORAL at 22:03

## 2021-12-18 RX ADMIN — DOCUSATE SODIUM 50MG AND SENNOSIDES 8.6MG 1 TABLET: 8.6; 5 TABLET, FILM COATED ORAL at 09:28

## 2021-12-18 NOTE — PROGRESS NOTES
End of Shift Note    Bedside shift change report given to Elia Monge RN (oncoming nurse) by James Torrez LPN (offgoing nurse). Report included the following information SBAR, Kardex, Procedure Summary, Intake/Output, MAR and Recent Results    Shift worked:  7p-7:30a     Shift summary and any significant changes:          Concerns for physician to address:       Zone phone for oncoming shift:   [phone]       Activity:  Activity Level: Up with Assistance  Number times ambulated in hallways past shift: 0  Number of times OOB to chair past shift: 0    Cardiac:   Cardiac Monitoring: No      Cardiac Rhythm: Sinus Rhythm    Access:   Current line(s): PIV     Genitourinary:   Urinary status: voiding and external catheter    Respiratory:   O2 Device: Nasal cannula  Chronic home O2 use?: NO  Incentive spirometer at bedside: YES     GI:     Current diet:  ADULT DIET Regular  Passing flatus: YES  Tolerating current diet: YES       Pain Management:   Patient states pain is manageable on current regimen: YES    Skin:  Noe Score: 17  Interventions: float heels, increase time out of bed, internal/external urinary devices and nutritional support     Patient Safety:  Fall Score:  Total Score: 3  Interventions: assistive device (walker, cane, etc), gripper socks, pt to call before getting OOB and stay with me (per policy)  High Fall Risk: Yes    Length of Stay:  Expected LOS: - - -  Actual LOS: 0      James Torrez LPN

## 2021-12-18 NOTE — PROGRESS NOTES
Problem: Mobility Impaired (Adult and Pediatric)  Goal: *Acute Goals and Plan of Care (Insert Text)  Description: FUNCTIONAL STATUS PRIOR TO ADMISSION: Patient was independent and active without use of DME.    HOME SUPPORT PRIOR TO ADMISSION: The patient lived with  but did not require assist. 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 Physical Therapy Goals  Initiated 12/18/2021  1. Patient will move from supine to sit and sit to supine  in bed with independence within 7 day(s). 2.  Patient will transfer from bed to chair and chair to bed with modified independence using the least restrictive device within 7 day(s). 3.  Patient will perform sit to stand with modified independence within 7 day(s). 4.  Patient will ambulate with modified independence for 50 feet with the least restrictive device within 7 day(s). 5.  Patient will ascend/descend 4 stairs with left handrail(s) with supervision/set-up within 7 day(s). Outcome: Not Met   PHYSICAL THERAPY EVALUATION  Patient: Greta Kay (75 y.o. female)  Date: 12/18/2021  Primary Diagnosis: Status post total knee replacement [Z96.659]  Procedure(s) (LRB):  RIGHT TOTAL KNEE ARTHROPLASTY WITH NAVIGATION (Right) 1 Day Post-Op   Precautions: Right LE WBAT    ASSESSMENT  Based on the objective data described below, the patient presents with general decrease in strength/activity tolerance, post-surgical pain right LE, decreased AROM right knee, decreased standing balance/tolerance, and decreased functional mobility, including bed mobility, transfers,and gait. She needs minimal assist for right LE supine to sit, and she is contact guard/increased time sit to/from stand. PA present at start of evaluation, stating that patient will stay overnight to allow nephrology consult; will defer stair training for today due to this.  She is able to ambulate 20 feet and 15 feet with rolling walker and contact guard assist. Patient will benefit from HHPT/assist from  as needed at discharge. Current Level of Function Impacting Discharge (mobility/balance): minimal assist supine to sit, contact guard with dynamic standing balance with 0-1 UE support, contact guard with transfers/gait short distances with rolling walker    Functional Outcome Measure: The patient scored 8/20 on the Elder Mobility Scale outcome measure which is indicative of dependence with ADL/functional mobility. Other factors to consider for discharge:  can provide assist, has rolling walker     Patient will benefit from skilled therapy intervention to address the above noted impairments. PLAN :  Recommendations and Planned Interventions: bed mobility training, transfer training, gait training, therapeutic exercises, neuromuscular re-education, edema management/control, patient and family training/education, and therapeutic activities      Frequency/Duration: Patient will be followed by physical therapy:  twice daily to address goals. Recommendation for discharge: (in order for the patient to meet his/her long term goals)  Physical therapy at least 2 days/week in the home AND ensure assist and/or supervision for safety with functional mobility as needed    This discharge recommendation:  Has not yet been discussed the attending provider and/or case management    IF patient discharges home will need the following DME: shower chair         SUBJECTIVE:   Patient stated I swell up like this when I take certain medications.     OBJECTIVE DATA SUMMARY:   HISTORY:    Past Medical History:   Diagnosis Date    Adverse effect of anesthesia     hard to wake up after surgery in   1994  broken   leg and broken   ankle        Aortic stenosis     Arthritis     back,feet    Chronic pain     back,feet    GERD (gastroesophageal reflux disease)     Hyperlipidemia     Hypertension     Murmur     Obesity     Other ill-defined conditions(799.89)     heart murmur     Stroke ([facility] [address] 1994    TIA, had aphagia and has resolved     Thyroid disease     Unspecified adverse effect of anesthesia     slow to wake up    Valvular heart disease      Past Surgical History:   Procedure Laterality Date    COLONOSCOPY N/A 1/27/2020    COLONOSCOPY performed by Joey Rodriguez MD at [facility] ENDOSCOPY    HX ORTHOPAEDIC Left     left ankle surgery due to fx    HX TONSILLECTOMY      HX UROLOGICAL  2013    bladder biopsy    DC COLONOSCOPY W/BIOPSY SINGLE/MULTIPLE  2/28/2014         DC EGD BALLOON DILATION ESOPHAGUS <30 MM DIAM  11/5/2012         DC EGD TRANSORAL BIOPSY SINGLE/MULTIPLE  11/5/2012            Personal factors and/or comorbidities impacting plan of care: high BMI, edema    Home Situation  Home Environment: Private residence  # Steps to Enter: 4  Rails to Enter: Yes  Hand Rails : Left  One/Two Story Residence: One story  Living Alone: No  Support Systems: Spouse/Significant Other  Patient Expects to be Discharged to[de-identified] House  Current DME Used/Available at Home: Walker, rolling  Tub or Shower Type: Tub/Shower combination    EXAMINATION/PRESENTATION/DECISION MAKING:   Critical Behavior:  Neurologic State: Alert  Orientation Level: Oriented X4  Cognition: Follows commands,Appropriate for age attention/concentration  Safety/Judgement: Awareness of environment,Fall prevention,Insight into deficits  Hearing:   Auditory  Auditory Impairment: None  Skin:  incision at right knee covered with clean dressing, gel patched at low back, IV site left forearm  Edema: right LE > left LE  Range Of Motion:  AROM: Generally decreased, functional                       Strength:    Strength: Generally decreased, functional                    Tone & Sensation:   Tone: Normal              Sensation: Impaired (hypersensitive to touch right LE)               Coordination:  Coordination: Generally decreased, functional    Functional Mobility:  Bed Mobility:     Supine to Sit: Additional time;Minimum assistance (for right LE)        Transfers:  Sit to Stand: Contact guard assistance;Assist x1  Stand to Sit: Contact guard assistance;Assist x1        Bed to Chair: Adaptive equipment;Assist x1;Contact guard assistance              Balance:   Sitting: Intact  Standing: Impaired; Without support  Standing - Static: Constant support;Good  Standing - Dynamic : Constant support; Fair  Ambulation/Gait Training:  Distance (ft): 35 Feet (ft) (20 feet, seated rest, then 15 feet)  Assistive Device: Walker, rolling;Gait belt  Ambulation - Level of Assistance: Contact guard assistance;Assist x1     Gait Description (WDL): Exceptions to WDL  Gait Abnormalities: Decreased step clearance; Ataxic  Right Side Weight Bearing: As tolerated  Left Side Weight Bearing: Full  Base of Support: Widened;Shift to left      Therapeutic Exercises:   Gentle A/AAROM in available range right LE prior to mobility    Functional Measure:    Elder Mobility Scale    8/20         Scores under 10 - generally these patients are dependent in mobility maneuvers; require help with  basic ADL, such as transfers, toileting and dressing. Scores between 10 - 13 - generally these patients are borderline in terms of safe mobility and  independence in ADL i.e. they require some help with some mobility maneuvers. Scores over 14 - Generally these patients are able to perform mobility maneuvers alone and safely  and are independent in basic ADL.              Physical Therapy Evaluation Charge Determination   History Examination Presentation Decision-Making   MEDIUM  Complexity : 1-2 comorbidities / personal factors will impact the outcome/ POC  LOW Complexity : 1-2 Standardized tests and measures addressing body structure, function, activity limitation and / or participation in recreation  LOW Complexity : Stable, uncomplicated  LOW Complexity : FOTO score of       Based on the above components, the patient evaluation is determined to be of the following complexity level: LOW     Pain Ratin/10 prior to mobility, 10/10 post-mobility, nursing aware and providing medication    Activity Tolerance:   Fair and SpO2 stable on RA    After treatment patient left in no apparent distress:   Sitting in chair, Call bell within reach, and Caregiver / family present    COMMUNICATION/EDUCATION:   The patients plan of care was discussed with: Registered nurse and PA . Fall prevention education was provided and the patient/caregiver indicated understanding., Patient/family have participated as able in goal setting and plan of care. , and Patient/family agree to work toward stated goals and plan of care.     Thank you for this referral.  Babak Carrillo, PT   Time Calculation: 44 mins

## 2021-12-18 NOTE — PROGRESS NOTES
ORTHO POST OP PROGRESS NOTE    2021  Admit Date: 2021  Admit Diagnosis: Status post total knee replacement [Z96.659]  Procedure: Procedure(s):  RIGHT TOTAL KNEE ARTHROPLASTY WITH NAVIGATION  Post Op day: 1 Day Post-Op    Subjective:     Indira Salas is a patient who has complaints of R knee pain s/p R TKA. POD#1. denies n/v. has purwick. nephrology to consult today. Review of Systems: Pertinent items are noted in HPI. Objective:     PT/OT:   Distance Ambulated:           Time Ambulated (min):        Ambulation Response: Activity Response: Fairly tolerated  Assistive Device:              Assistive Device: Fall prevention device    Vital Signs:    Blood pressure (!) (P) 178/74, pulse (P) 75, temperature 98.5 °F (36.9 °C), resp. rate 18, height 5' 3\" (1.6 m), weight 87.3 kg (192 lb 7.4 oz), SpO2 97 %. Temp (24hrs), Av.4 °F (36.9 °C), Min:98 °F (36.7 °C), Max:98.9 °F (37.2 °C)      No intake/output data recorded.  1901 -  0700  In: 620 [I.V.:620]  Out: 650 [Urine:650]    LAB:    Recent Labs     21  0335   HGB 11.5       Wound/Drain Assessment:  Drain:      Dressing:     Physical Exam:  Incision clean, dry, and intact  NVI  Diffuse tenderness  Assessment:      Patient Active Problem List   Diagnosis Code    Essential hypertension, benign I10    Pure hypercholesterolemia E78.00    Overweight E66.3    Hypothyroidism E03.9    DJD (degenerative joint disease) M19.90    Overactive bladder N32.81    Diverticulosis K57.90    Obesity E66.9    DDD (degenerative disc disease), lumbar M51.36    Heart murmur R01.1    Advanced care planning/counseling discussion Z71.89    Nonrheumatic aortic valve stenosis I35.0    Status post total knee replacement Z96.659       Plan:     Continue PT/OT/Rehab  Discontinue:  Consult: PT  and Recreational Therapy    Discharge To: home v rehab pending progress   PT WBAT  Asa 81mg PO BID for dvt  Nephrology to consult for fluid mgmt. Appreciate assistance.    Likely though the weekend

## 2021-12-18 NOTE — CONSULTS
Assessment:  Volume overload/Edema: mild-> acute on chronic issue for patient. IVF may have exacerbated the issue for patient albeit edema is mild. Managed by my partner Dr Joyce Johnson in outpatient setting. On PRN Lasix 20mg daily at home. No overt proteinuria noted on last UA from 12/8/21. Prior echo shows Nl EF/Mild AS    HTN: Fair. Volume overload contributing    R TKR: 12/17/21    Plan/Recommendations:  Would leave Lasix on 20mg daily for days then PRN thereafter  Some diuresis should help BP some  Ct Amlodipine and Toprol XL  Stable for discharge tomorrow from renal standpoint  Can follow up with my partner Dr. Joyce Johnson in the office in 3-4wks        Discussed with patient    Thanks for the consultation. Renal service will follow patient with you. Please contact me with any questions or concerns. Initial Consult note         Patient name: Marybel Ayoub  MR no: [id number]  Date of admission: 12/17/2021  Date of consultation: 12/18/2021  Requested by: Dr. Christiano Shearer  Reason for consult: Edema/Volume overload    Patient seen and examined. History obtained from patient and chart review. Relevant labs, data and notes reviewed. HPI: Marybel Ayoub is a 78 y.o. female with PMH significant for HTN, AS, Hypothyroidism s/p elective R TKR yesterday. Pt requested Nephrology evaluation for LE edema. Patient sees my partner Dr. Joyce Johnson for Edema management as an outpatient. Renal indices stable. No SOB. Patient states both legs feel heavy. Was on Lasix PRN at home PTA.     PMH:  Past Medical History:   Diagnosis Date    Adverse effect of anesthesia     hard to wake up after surgery in   1994  broken   leg and broken   ankle        Aortic stenosis     Arthritis     back,feet    Chronic pain     back,feet    GERD (gastroesophageal reflux disease)     Hyperlipidemia     Hypertension     Murmur     Obesity     Other ill-defined conditions(799.89)     heart murmur     Stroke ([facility] [address] 1994    TIA, had aphagia and has resolved     Thyroid disease     Unspecified adverse effect of anesthesia     slow to wake up    Valvular heart disease      PSH:  Past Surgical History:   Procedure Laterality Date    COLONOSCOPY N/A 1/27/2020    COLONOSCOPY performed by Carmita Caldwell MD at [facility]"" ENDOSCOPY    HX ORTHOPAEDIC Left     left ankle surgery due to fx    HX TONSILLECTOMY      HX UROLOGICAL  2013    bladder biopsy    NV COLONOSCOPY W/BIOPSY SINGLE/MULTIPLE  2/28/2014         NV EGD BALLOON DILATION ESOPHAGUS <30 MM DIAM  11/5/2012         NV EGD TRANSORAL BIOPSY SINGLE/MULTIPLE  11/5/2012            Social history:   Social History     Tobacco Use    Smoking status: Never Smoker    Smokeless tobacco: Never Used   Vaping Use    Vaping Use: Never used   Substance Use Topics    Alcohol use: No    Drug use: Never     Types: Prescription, OTC       Family history:  No history of CKD or ESRD in the family.     Allergies   Allergen Reactions    Other Food Nausea and Vomiting     Black eye peas    Statins-Hmg-Coa Reductase Inhibitors Other (comments)     Severe pain and weakness    Amoxicillin Hives    Colestid [Colestipol] Myalgia    Gabapentin Other (comments)     Edema, GI problem    Pcn [Penicillins] Hives    Pepper (Genus Capsicum) Nausea Only     Upset stomach    Pravastatin Myalgia     Severe pain and weakness with all statins       Current Facility-Administered Medications   Medication Dose Route Frequency Last Admin    metoprolol succinate (TOPROL-XL) XL tablet 25 mg  25 mg Oral DAILY 25 mg at 12/18/21 0928    levothyroxine (SYNTHROID) tablet 25 mcg  25 mcg Oral ACB 25 mcg at 12/18/21 0928    furosemide (LASIX) tablet 20 mg  20 mg Oral DAILY 20 mg at 12/18/21 0928    DULoxetine (CYMBALTA) capsule 20 mg  20 mg Oral QHS 20 mg at 12/17/21 3232    amLODIPine (NORVASC) tablet 5 mg  5 mg Oral DAILY 5 mg at 12/18/21 4772    0.9% sodium chloride infusion  125 mL/hr IntraVENous CONTINUOUS 125 mL/hr at 12/17/21 1321    sodium chloride 0.9 % bolus infusion 500 mL  500 mL IntraVENous ONCE PRN      sodium chloride (NS) flush 5-40 mL  5-40 mL IntraVENous Q8H 10 mL at 12/18/21 0932    sodium chloride (NS) flush 5-40 mL  5-40 mL IntraVENous PRN      acetaminophen (TYLENOL) tablet 650 mg  650 mg Oral Q6H 650 mg at 12/18/21 0544    oxyCODONE IR (ROXICODONE) tablet 5 mg  5 mg Oral Q3H PRN 5 mg at 12/18/21 0928    oxyCODONE IR (ROXICODONE) tablet 10 mg  10 mg Oral Q3H PRN 10 mg at 12/18/21 0329    naloxone (NARCAN) injection 0.4 mg  0.4 mg IntraVENous PRN      ondansetron (ZOFRAN) injection 4 mg  4 mg IntraVENous Q4H PRN      [START ON 12/19/2021] ondansetron (ZOFRAN ODT) tablet 4 mg  4 mg Oral Q6H PRN      diphenhydrAMINE (BENADRYL) 12.5 mg/5 mL oral liquid 12.5 mg  12.5 mg Oral Q6H PRN      senna-docusate (PERICOLACE) 8.6-50 mg per tablet 1 Tablet  1 Tablet Oral BID 1 Tablet at 12/18/21 0928    polyethylene glycol (MIRALAX) packet 17 g  17 g Oral DAILY 17 g at 12/18/21 0929    [START ON 12/19/2021] bisacodyL (DULCOLAX) suppository 10 mg  10 mg Rectal DAILY PRN      morphine injection 2 mg  2 mg IntraVENous Q3H PRN      famotidine (PEPCID) tablet 20 mg  20 mg Oral BID 20 mg at 12/18/21 0702    aspirin delayed-release tablet 81 mg  81 mg Oral BID 81 mg at 12/18/21 0928       ROS (besides HPI):    General: No fever. No weight changes  ENT: No hearing loss or visual changes  Cardiovascular: No Chest pain. +Edema  Pulmonary: No SOB  GI: No abdominal pain. No Nausea/Vomiting/Diarrhea. No blood in stool  : No blood in urine.  No foamy or cloudy urine  Musculoskeletal: +joint swelling/ stiffness  Endocrine: no cold or heat intolerance  Psych: denies anxiety or depression  Neuro: No light headedness or dizziness    Objective   Visit Vitals  BP (!) 160/52   Pulse 64   Temp 97.7 °F (36.5 °C)   Resp 18   Ht 5' 3\" (1.6 m)   Wt 87.3 kg (192 lb 7.4 oz)   SpO2 96%   BMI 34.09 kg/m²       Physical Exam:    Gen: NAD/obese    HEENT: AT/NC, EOMI, moist mucous membrane, no scleral icterus    Neck: no JVD, no cervical lymphadenopathy, no carotid bruit    Lungs/Chest wall: Breath sounds normal. Symmetrical chest wall expansion. No accessory muscle use. Clear to auscultation    Cardiovascular: Normal S1/S2, normal rate, regular rhythm. +YASIR    Abdomen: soft, NT, ND, BS+, no HSM    Ext: Right knee wrapped. Trace LE edema    Skin: warm and dry. No rashes    : PureWick    CNS: alert awake. Answers appropriately.      Labs/Data:    Lab Results   Component Value Date/Time    Sodium 139 12/18/2021 03:35 AM    Potassium 3.8 12/18/2021 03:35 AM    Chloride 108 12/18/2021 03:35 AM    CO2 24 12/18/2021 03:35 AM    Anion gap 7 12/18/2021 03:35 AM    Glucose 111 (H) 12/18/2021 03:35 AM    BUN 19 12/18/2021 03:35 AM    Creatinine 0.87 12/18/2021 03:35 AM    BUN/Creatinine ratio 22 (H) 12/18/2021 03:35 AM    GFR est AA >60 12/18/2021 03:35 AM    GFR est non-AA >60 12/18/2021 03:35 AM    Calcium 8.3 (L) 12/18/2021 03:35 AM       Lab Results   Component Value Date/Time    WBC 6.3 12/08/2021 11:05 AM    HGB 11.5 12/18/2021 03:35 AM    HCT 42.5 12/08/2021 11:05 AM    PLATELET 381 23/00/8521 11:05 AM    MCV 93.2 12/08/2021 11:05 AM       Urine analysis:   Results for orders placed or performed in visit on 10/06/14   AMB POC URINALYSIS DIP STICK AUTO W/O MICRO     Status: None   Result Value Ref Range Status    Color (UA POC) Yellow  Final    Clarity (UA POC) Cloudy  Final    Glucose (UA POC) Negative Negative Final    Bilirubin (UA POC) Negative Negative Final    Ketones (UA POC) Negative Negative Final    Specific gravity (UA POC) 1.030 1.001 - 1.035 Final    Blood (UA POC) 4+ Negative Final    pH (UA POC) 5.5 4.6 - 8.0 Final    Protein (UA POC) 3+ Negative mg/dL Final    Urobilinogen (UA POC) 0.2 mg/dL 0.2 - 1 Final    Nitrites (UA POC) Positive Negative Final    Leukocyte esterase (UA POC) 1+ Negative Final           No components found for: SPEP, UPEP  No results found for: PUQ, PROTU2, PROTU1, BJP1, CPE1, IMEL1, MET2  No results found for: MCACR, MCA1, MCA2, MCA3, MCAU, MCAU2, MCALPOCT      Intake/Output Summary (Last 24 hours) at 12/18/2021 1129  Last data filed at 12/17/2021 1758  Gross per 24 hour   Intake 620 ml   Output 650 ml   Net -30 ml       Wt Readings from Last 3 Encounters:   12/17/21 87.3 kg (192 lb 7.4 oz)   12/08/21 88 kg (194 lb 0.1 oz)   12/03/21 87.1 kg (192 lb)       Signed by:  Graeme Monroy MD  Nephrology and Hypertension  [facility]

## 2021-12-19 LAB
ANION GAP SERPL CALC-SCNC: 7 MMOL/L (ref 5–15)
BUN SERPL-MCNC: 20 MG/DL (ref 6–20)
BUN/CREAT SERPL: 27 (ref 12–20)
CALCIUM SERPL-MCNC: 8.8 MG/DL (ref 8.5–10.1)
CHLORIDE SERPL-SCNC: 106 MMOL/L (ref 97–108)
CO2 SERPL-SCNC: 25 MMOL/L (ref 21–32)
CREAT SERPL-MCNC: 0.74 MG/DL (ref 0.55–1.02)
GLUCOSE SERPL-MCNC: 114 MG/DL (ref 65–100)
HGB BLD-MCNC: 11.4 G/DL (ref 11.5–16)
POTASSIUM SERPL-SCNC: 3.7 MMOL/L (ref 3.5–5.1)
SODIUM SERPL-SCNC: 138 MMOL/L (ref 136–145)

## 2021-12-19 PROCEDURE — 97116 GAIT TRAINING THERAPY: CPT

## 2021-12-19 PROCEDURE — 74011250637 HC RX REV CODE- 250/637: Performed by: PHYSICIAN ASSISTANT

## 2021-12-19 PROCEDURE — 65270000029 HC RM PRIVATE

## 2021-12-19 PROCEDURE — 80048 BASIC METABOLIC PNL TOTAL CA: CPT

## 2021-12-19 PROCEDURE — 97530 THERAPEUTIC ACTIVITIES: CPT

## 2021-12-19 PROCEDURE — 97110 THERAPEUTIC EXERCISES: CPT

## 2021-12-19 PROCEDURE — 85018 HEMOGLOBIN: CPT

## 2021-12-19 PROCEDURE — 74011250637 HC RX REV CODE- 250/637: Performed by: INTERNAL MEDICINE

## 2021-12-19 PROCEDURE — 36415 COLL VENOUS BLD VENIPUNCTURE: CPT

## 2021-12-19 RX ORDER — POTASSIUM CHLORIDE 750 MG/1
40 TABLET, FILM COATED, EXTENDED RELEASE ORAL
Status: COMPLETED | OUTPATIENT
Start: 2021-12-19 | End: 2021-12-19

## 2021-12-19 RX ADMIN — LEVOTHYROXINE SODIUM 25 MCG: 0.03 TABLET ORAL at 06:59

## 2021-12-19 RX ADMIN — Medication 10 ML: at 07:01

## 2021-12-19 RX ADMIN — OXYCODONE 10 MG: 5 TABLET ORAL at 11:16

## 2021-12-19 RX ADMIN — DOCUSATE SODIUM 50MG AND SENNOSIDES 8.6MG 1 TABLET: 8.6; 5 TABLET, FILM COATED ORAL at 17:22

## 2021-12-19 RX ADMIN — ACETAMINOPHEN 650 MG: 325 TABLET ORAL at 17:22

## 2021-12-19 RX ADMIN — DOCUSATE SODIUM 50MG AND SENNOSIDES 8.6MG 1 TABLET: 8.6; 5 TABLET, FILM COATED ORAL at 08:00

## 2021-12-19 RX ADMIN — DULOXETINE HYDROCHLORIDE 20 MG: 20 CAPSULE, DELAYED RELEASE ORAL at 21:57

## 2021-12-19 RX ADMIN — OXYCODONE 10 MG: 5 TABLET ORAL at 21:57

## 2021-12-19 RX ADMIN — ACETAMINOPHEN 650 MG: 325 TABLET ORAL at 06:59

## 2021-12-19 RX ADMIN — ACETAMINOPHEN 650 MG: 325 TABLET ORAL at 11:12

## 2021-12-19 RX ADMIN — OXYCODONE 10 MG: 5 TABLET ORAL at 04:55

## 2021-12-19 RX ADMIN — POLYETHYLENE GLYCOL 3350 17 G: 17 POWDER, FOR SOLUTION ORAL at 08:00

## 2021-12-19 RX ADMIN — POTASSIUM CHLORIDE 40 MEQ: 750 TABLET, FILM COATED, EXTENDED RELEASE ORAL at 14:37

## 2021-12-19 RX ADMIN — FAMOTIDINE 20 MG: 20 TABLET ORAL at 08:00

## 2021-12-19 RX ADMIN — FAMOTIDINE 20 MG: 20 TABLET ORAL at 17:22

## 2021-12-19 RX ADMIN — ASPIRIN 81 MG: 81 TABLET, COATED ORAL at 17:22

## 2021-12-19 RX ADMIN — ACETAMINOPHEN 650 MG: 325 TABLET ORAL at 00:32

## 2021-12-19 RX ADMIN — OXYCODONE 10 MG: 5 TABLET ORAL at 14:37

## 2021-12-19 RX ADMIN — METOPROLOL SUCCINATE 25 MG: 25 TABLET, EXTENDED RELEASE ORAL at 08:00

## 2021-12-19 RX ADMIN — OXYCODONE 10 MG: 5 TABLET ORAL at 17:22

## 2021-12-19 RX ADMIN — ASPIRIN 81 MG: 81 TABLET, COATED ORAL at 08:00

## 2021-12-19 RX ADMIN — FUROSEMIDE 20 MG: 20 TABLET ORAL at 08:00

## 2021-12-19 RX ADMIN — AMLODIPINE BESYLATE 5 MG: 5 TABLET ORAL at 08:00

## 2021-12-19 RX ADMIN — Medication 10 ML: at 11:13

## 2021-12-19 RX ADMIN — OXYCODONE 10 MG: 5 TABLET ORAL at 08:01

## 2021-12-19 NOTE — PROGRESS NOTES
End of Shift Note    Bedside shift change report given to CITLALY Galindo (oncoming nurse) by Jose Roberto Garrett (offgoing nurse). Report included the following information SBAR, Kardex, Procedure Summary, Intake/Output, MAR and Recent Results    Shift worked:  7p-11p     Shift summary and any significant changes:          Concerns for physician to address:       Zone phone for oncoming shift:   [phone]       Activity:  Activity Level: Up with Assistance  Number times ambulated in hallways past shift: 0  Number of times OOB to chair past shift: 0    Cardiac:   Cardiac Monitoring: No      Cardiac Rhythm: Sinus Rhythm    Access:   Current line(s): PIV     Genitourinary:   Urinary status: voiding and external catheter    Respiratory:   O2 Device: None (Room air)  Chronic home O2 use?: NO  Incentive spirometer at bedside: YES     GI:     Current diet:  ADULT DIET Regular  Passing flatus: YES  Tolerating current diet: YES       Pain Management:   Patient states pain is manageable on current regimen: YES    Skin:  Noe Score: 18  Interventions: float heels, increase time out of bed, internal/external urinary devices and nutritional support     Patient Safety:  Fall Score:  Total Score: 3  Interventions: assistive device (walker, cane, etc), gripper socks, pt to call before getting OOB and stay with me (per policy)  High Fall Risk: Yes    Length of Stay:  Expected LOS: - - -  Actual LOS: [facility]

## 2021-12-19 NOTE — PROGRESS NOTES
Problem: Pressure Injury - Risk of  Goal: *Prevention of pressure injury  Description: Document Noe Scale and appropriate interventions in the flowsheet. Outcome: Progressing Towards Goal  Note: Pressure Injury Interventions:  Sensory Interventions: Assess changes in LOC,Avoid rigorous massage over bony prominences,Discuss PT/OT consult with provider,Float heels,Keep linens dry and wrinkle-free,Maintain/enhance activity level,Turn and reposition approx. every two hours (pillows and wedges if needed)    Moisture Interventions: Absorbent underpads,Internal/External urinary devices    Activity Interventions: Increase time out of bed    Mobility Interventions: Float heels,HOB 30 degrees or less,Pressure redistribution bed/mattress (bed type)    Nutrition Interventions: Document food/fluid/supplement intake    Friction and Shear Interventions: Apply protective barrier, creams and emollients                Problem: Patient Education: Go to Patient Education Activity  Goal: Patient/Family Education  Outcome: Progressing Towards Goal     Problem: Falls - Risk of  Goal: *Absence of Falls  Description: Document Purvi Fall Risk and appropriate interventions in the flowsheet.   Outcome: Progressing Towards Goal  Note: Fall Risk Interventions:  Mobility Interventions: Bed/chair exit alarm         Medication Interventions: Patient to call before getting OOB    Elimination Interventions: Bed/chair exit alarm    History of Falls Interventions: Consult care management for discharge planning         Problem: Patient Education: Go to Patient Education Activity  Goal: Patient/Family Education  Outcome: Progressing Towards Goal     Problem: Infection - Risk of, Surgical Site Infection  Goal: *Absence of surgical site infection signs and symptoms  Outcome: Progressing Towards Goal     Problem: Patient Education: Go to Patient Education Activity  Goal: Patient/Family Education  Outcome: Progressing Towards Goal     Problem: Pain  Goal: *Control of Pain  Outcome: Progressing Towards Goal     Problem: Patient Education: Go to Patient Education Activity  Goal: Patient/Family Education  Outcome: Progressing Towards Goal     Problem: Knee Replacement: Post-Op Day 1  Goal: Activity/Safety  Outcome: Progressing Towards Goal  Goal: Diagnostic Test/Procedures  Outcome: Progressing Towards Goal  Goal: Nutrition/Diet  Outcome: Progressing Towards Goal  Goal: Medications  Outcome: Progressing Towards Goal  Goal: Respiratory  Outcome: Progressing Towards Goal  Goal: Treatments/Interventions/Procedures  Outcome: Progressing Towards Goal  Goal: Psychosocial  Outcome: Progressing Towards Goal  Goal: Discharge Planning  Outcome: Progressing Towards Goal  Goal: *Demonstrates progressive activity  Outcome: Progressing Towards Goal  Goal: *Optimal pain control at patient's stated goal  Outcome: Progressing Towards Goal  Goal: *Discharge plan identified  Outcome: Progressing Towards Goal

## 2021-12-19 NOTE — PROGRESS NOTES
Bedside, Verbal and Written shift change report given to Paulina (oncoming nurse) by Josie (offgoing nurse). Report included the following information SBAR, Kardex, OR Summary, Procedure Summary, Intake/Output, MAR, Accordion and Recent Results.

## 2021-12-19 NOTE — PROGRESS NOTES
Problem: Pressure Injury - Risk of  Goal: *Prevention of pressure injury  Description: Document Noe Scale and appropriate interventions in the flowsheet. Outcome: Progressing Towards Goal  Note: Pressure Injury Interventions:  Sensory Interventions: Assess changes in LOC,Assess need for specialty bed,Avoid rigorous massage over bony prominences,Chair cushion,Check visual cues for pain,Float heels,Discuss PT/OT consult with provider,Keep linens dry and wrinkle-free,Maintain/enhance activity level,Minimize linen layers,Monitor skin under medical devices,Pressure redistribution bed/mattress (bed type),Pad between skin to skin    Moisture Interventions: Absorbent underpads,Apply protective barrier, creams and emollients,Assess need for specialty bed,Check for incontinence Q2 hours and as needed,Contain wound drainage,Limit adult briefs,Maintain skin hydration (lotion/cream),Minimize layers,Moisture barrier,Offer toileting Q_hr    Activity Interventions: Assess need for specialty bed,Chair cushion,Increase time out of bed,Pressure redistribution bed/mattress(bed type),PT/OT evaluation    Mobility Interventions: Assess need for specialty bed,Chair cushion,Float heels,HOB 30 degrees or less,PT/OT evaluation,Pressure redistribution bed/mattress (bed type),Turn and reposition approx.  every two hours(pillow and wedges)    Nutrition Interventions: Document food/fluid/supplement intake,Offer support with meals,snacks and hydration,Discuss nutritional consult with provider    Friction and Shear Interventions: Apply protective barrier, creams and emollients,Feet elevated on foot rest,Foam dressings/transparent film/skin sealants,Lift sheet,HOB 30 degrees or less,Lift team/patient mobility team,Minimize layers                Problem: Patient Education: Go to Patient Education Activity  Goal: Patient/Family Education  Outcome: Progressing Towards Goal     Problem: Falls - Risk of  Goal: *Absence of Falls  Description: Document Nakita Rojas Fall Risk and appropriate interventions in the flowsheet.   Outcome: Progressing Towards Goal  Note: Fall Risk Interventions:  Mobility Interventions: Assess mobility with egress test,Communicate number of staff needed for ambulation/transfer,OT consult for ADLs,Patient to call before getting OOB,PT Consult for mobility concerns,PT Consult for assist device competence,Strengthening exercises (ROM-active/passive),Utilize walker, cane, or other assistive device,Utilize gait belt for transfers/ambulation         Medication Interventions: Assess postural VS orthostatic hypotension,Evaluate medications/consider consulting pharmacy,Patient to call before getting OOB,Teach patient to arise slowly,Utilize gait belt for transfers/ambulation    Elimination Interventions: Call light in reach,Elevated toilet seat,Patient to call for help with toileting needs,Toilet paper/wipes in reach,Toileting schedule/hourly rounds,Stay With Me (per policy)    History of Falls Interventions: Consult care management for discharge planning,Door open when patient unattended,Evaluate medications/consider consulting pharmacy,Room close to nurse's station,Utilize gait belt for transfer/ambulation,Assess for delayed presentation/identification of injury for 48 hrs (comment for end date),Vital signs minimum Q4HRs X 24 hrs (comment for end date)         Problem: Patient Education: Go to Patient Education Activity  Goal: Patient/Family Education  Outcome: Progressing Towards Goal     Problem: Infection - Risk of, Surgical Site Infection  Goal: *Absence of surgical site infection signs and symptoms  Outcome: Progressing Towards Goal     Problem: Patient Education: Go to Patient Education Activity  Goal: Patient/Family Education  Outcome: Progressing Towards Goal     Problem: Pain  Goal: *Control of Pain  Outcome: Progressing Towards Goal     Problem: Patient Education: Go to Patient Education Activity  Goal: Patient/Family Education  Outcome: Progressing Towards Goal     Problem: Patient Education: Go to Patient Education Activity  Goal: Patient/Family Education  Outcome: Progressing Towards Goal     Problem: Knee Replacement: Post-Op Day 1  Goal: Off Pathway (Use only if patient is Off Pathway)  Outcome: Progressing Towards Goal  Goal: Activity/Safety  Outcome: Progressing Towards Goal  Goal: Diagnostic Test/Procedures  Outcome: Progressing Towards Goal  Goal: Nutrition/Diet  Outcome: Progressing Towards Goal  Goal: Medications  Outcome: Progressing Towards Goal  Goal: Respiratory  Outcome: Progressing Towards Goal  Goal: Treatments/Interventions/Procedures  Outcome: Progressing Towards Goal  Goal: Psychosocial  Outcome: Progressing Towards Goal  Goal: Discharge Planning  Outcome: Progressing Towards Goal  Goal: *Demonstrates progressive activity  Outcome: Progressing Towards Goal  Goal: *Optimal pain control at patient's stated goal  Outcome: Progressing Towards Goal  Goal: *Hemodynamically stable  Outcome: Progressing Towards Goal  Goal: *Discharge plan identified  Outcome: Progressing Towards Goal     Problem: Knee Replacement: Post-Op Day 2  Goal: Off Pathway (Use only if patient is Off Pathway)  Outcome: Progressing Towards Goal  Goal: Activity/Safety  Outcome: Progressing Towards Goal  Goal: Diagnostic Test/Procedures  Outcome: Progressing Towards Goal  Goal: Medications  Outcome: Progressing Towards Goal  Goal: Respiratory  Outcome: Progressing Towards Goal  Goal: Treatments/Interventions/Procedures  Outcome: Progressing Towards Goal  Goal: Psychosocial  Outcome: Progressing Towards Goal  Goal: *Met physical therapy criteria for discharge to the next level of care  Outcome: Progressing Towards Goal  Goal: *Optimal pain control with oral analgesia  Outcome: Progressing Towards Goal  Goal: *Hemodynamically stable  Outcome: Progressing Towards Goal  Goal: *Tolerating diet  Outcome: Progressing Towards Goal  Goal: *Patient verbalizes understanding of discharge instructions  Outcome: Progressing Towards Goal     Problem: Patient Education: Go to Patient Education Activity  Goal: Patient/Family Education  Outcome: Progressing Towards Goal

## 2021-12-19 NOTE — PROGRESS NOTES
End of Shift Note    Bedside shift change report given to Jaqueline Huff RN (oncoming nurse) by Stacey Cline RN (offgoing nurse). Report included the following information SBAR, Kardex, Intake/Output, MAR and Recent Results    Shift worked:  day     Shift summary and any significant changes:          Concerns for physician to address:       Zone phone for oncoming shift:   [phone]       Activity:  Activity Level: Up with Assistance  Number times ambulated in hallways past shift: 0  Number of times OOB to chair past shift: 2    Cardiac:   Cardiac Monitoring: No      Cardiac Rhythm: Sinus Rhythm    Access:   Current line(s): PIV     Genitourinary:   Urinary status: voiding    Respiratory:   O2 Device: None (Room air)  Chronic home O2 use?: NO  Incentive spirometer at bedside: YES     GI:     Current diet:  ADULT DIET Regular  Passing flatus: YES  Tolerating current diet: YES       Pain Management:   Patient states pain is manageable on current regimen: YES    Skin:  Noe Score: 18  Interventions: float heels, increase time out of bed, PT/OT consult, limit briefs and internal/external urinary devices    Patient Safety:  Fall Score:  Total Score: 3  Interventions: assistive device (walker, cane, etc), gripper socks, pt to call before getting OOB and stay with me (per policy)  High Fall Risk: Yes    Length of Stay:  Expected LOS: - - -  Actual LOS: 2      Paulina Florian RN

## 2021-12-19 NOTE — PROGRESS NOTES
Problem: Mobility Impaired (Adult and Pediatric)  Goal: *Acute Goals and Plan of Care (Insert Text)  Description: FUNCTIONAL STATUS PRIOR TO ADMISSION: Patient was independent and active without use of DME.    HOME SUPPORT PRIOR TO ADMISSION: The patient lived with  but did not require assist. 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 Physical Therapy Goals  Initiated 12/18/2021  1. Patient will move from supine to sit and sit to supine  in bed with independence within 7 day(s). 2.  Patient will transfer from bed to chair and chair to bed with modified independence using the least restrictive device within 7 day(s). 3.  Patient will perform sit to stand with modified independence within 7 day(s). 4.  Patient will ambulate with modified independence for 50 feet with the least restrictive device within 7 day(s). 5.  Patient will ascend/descend 4 stairs with left handrail(s) with supervision/set-up within 7 day(s). Outcome: Progressing Towards Goal     PHYSICAL THERAPY TREATMENT  Patient: Bethena Leyden (58 y.o. female)  Date: 12/19/2021  Diagnosis: Status post total knee replacement [Z96.659] <principal problem not specified>  Procedure(s) (LRB):  RIGHT TOTAL KNEE ARTHROPLASTY WITH NAVIGATION (Right) 2 Days Post-Op  Precautions: WBAT  Chart, physical therapy assessment, plan of care and goals were reviewed. ASSESSMENT  Patient continues with skilled PT services and is progressing towards goals. Mrs. Arcelia Lynch tolerated today's session well. Increased ambulatory distance to 45ft with RW at [facility] level. Requires min A for bed mobility and CGA for transfers. Remains limited with R LE edema restricting ROM and pain with functional mobility. She continues to demonstrate gait dysfunction and balance impairment. She will benefit from continued skilled PT services to address deficits and facilitate safe return home. Will attempt stairs next visit if able.       Current Level of Function Impacting Discharge (mobility/balance): CGA    Other factors to consider for discharge: ANGI home,  is elderly         PLAN :  Patient continues to benefit from skilled intervention to address the above impairments. Continue treatment per established plan of care. to address goals. Recommendation for discharge: (in order for the patient to meet his/her long term goals)  Physical therapy at least 2 days/week in the home AND ensure assist and/or supervision for safety with all mobility tasks at least initially    This discharge recommendation:  Has been made in collaboration with the attending provider and/or case management    IF patient discharges home will need the following DME: rolling walker       SUBJECTIVE:   Patient stated the swelling is definitely better.     OBJECTIVE DATA SUMMARY:   Critical Behavior:  Neurologic State: Alert,Eyes open spontaneously  Orientation Level: Oriented X4  Cognition: Follows commands  Safety/Judgement: Awareness of environment,Fall prevention,Insight into deficits    Range of Motion:            R knee measuring 10-60 actively and 8-80 passively                 Functional Mobility Training:  Bed Mobility:     Supine to Sit: Minimum assistance; Additional time  Assist for R LE management     Scooting: Minimum assistance; Additional time  Assist for R LE management  Level of Assistance: Assist X1     Transfers:  Sit to Stand: Contact guard assistance  From bed and commode with cues for UE placement to prevent pulling on walker. Stand to Sit: Contact guard assistance  To commode and chair with cues for UE placement and slow controlled lower                       Interventions: Safety awareness training;Verbal cues  Level of Assistance: Assist x1  Balance:  Sitting: Intact  Standing: Impaired; Without support  Standing - Static: Constant support;Good  Standing - Dynamic : Constant support; Fair  Ambulation/Gait Training:  Distance (ft): 45 Feet (ft)  Assistive Device: Jessenia Samayoa rolling;Gait belt  Ambulation - Level of Assistance: Contact guard assistance        Gait Abnormalities: Antalgic;Decreased step clearance  Right Side Weight Bearing: As tolerated  Left Side Weight Bearing: Full  Base of Support: Widened;Shift to left  Stance: Right decreased  Speed/Kadi: Slow  Step Length: Left shortened  Swing Pattern: Right asymmetrical     Interventions: Safety awareness training;Verbal cues         Patient ambulated 45ft with RW at [facility] level. Cues for sequencing of steps and for walker positioning/management. Increased time to complete. Mostly step to pattern with occasionally step through. No overt LOB or balance checks occurred.        Therapeutic Exercises:     EXERCISE   Sets   Reps   Active Active Assist   Passive Self ROM   Comments   Ankle Pumps   [x]                                        []                                        []                                        []                                           Quad Sets   []                                        []                                        []                                        []                                           Hamstring Sets   []                                        []                                        []                                        []                                           Short Arc Quads   []                                        []                                        []                                        []                                           Knee Extension Stretch     [x]                                          []                                          []                                          []                                           Heel Slides   []                                        []                                        []                                        []                                           Long Arc Quads   [x] []                                        []                                        []                                           Knee Flexion Stretch   [x]                                        []                                        []                                        []                                           Straight Leg Raises   []                                        []                                        []                                        []                                               Pain Ratin/10 in R knee    Activity Tolerance:   Good and requires rest breaks    After treatment patient left in no apparent distress:   Sitting in chair, Heels elevated for pressure relief, and Call bell within reach    COMMUNICATION/COLLABORATION:   The patients plan of care was discussed with: Registered nurse and Rehabilitation technician.      SANTO CottoT   Time Calculation: 38 mins

## 2021-12-19 NOTE — PROGRESS NOTES
ORTHO POST OP PROGRESS NOTE    2021  Admit Date: 2021  Admit Diagnosis: Status post total knee replacement [Z96.659]  Procedure: Procedure(s):  RIGHT TOTAL KNEE ARTHROPLASTY WITH NAVIGATION  Post Op day: 2 Days Post-Op    Subjective:     Washington Nicholson is a patient who has complaints of knee pain s/p R TKR POD #2. slow progress with PT. chronic swelling, sees nephrology as out pt, inpt eval by Dr. Kallie Frankel yesterday. Feeling somewhat better today. Pain controlled with oxycodone  Voiding with purwick  Review of Systems: Pertinent items are noted in HPI. Objective:     PT/OT:   Distance Ambulated:           Time Ambulated (min):        Ambulation Response: Activity Response: Fairly tolerated  Assistive Device:              Assistive Device: Fall prevention device,Walker (comment)    Vital Signs:    Blood pressure (!) 162/63, pulse 60, temperature 98.1 °F (36.7 °C), resp. rate 16, height 5' 3\" (1.6 m), weight 87.3 kg (192 lb 7.4 oz), SpO2 97 %. Temp (24hrs), Av.3 °F (36.8 °C), Min:97.7 °F (36.5 °C), Max:98.8 °F (37.1 °C)      No intake/output data recorded.    1901 -  0700  In: -   Out: 2100 [Urine:2100]    LAB:    Recent Labs     21  0457   HGB 11.4*       Wound/Drain Assessment:  Drain:      Dressing:     Physical Exam:  Incision clean, dry, and intact  NVI    Assessment:      Patient Active Problem List   Diagnosis Code    Essential hypertension, benign I10    Pure hypercholesterolemia E78.00    Overweight E66.3    Hypothyroidism E03.9    DJD (degenerative joint disease) M19.90    Overactive bladder N32.81    Diverticulosis K57.90    Obesity E66.9    DDD (degenerative disc disease), lumbar M51.36    Heart murmur R01.1    Advanced care planning/counseling discussion Z71.89    Nonrheumatic aortic valve stenosis I35.0    Status post total knee replacement Z96.659       Plan:     Continue PT/OT/Rehab  Discontinue:   Consult: PT  and OT    Discharge To: home with family assistance   Acute on chronic volume overload/edema- cont lasix 20mg daily x 5 and f/u with Dr. Dior Hinojosa in 3-4 weeks. Stable.  Appreciate recs  PT WBAT  Asa 81mg PO BID for dvt  Could go home this afternoon if clears PT  Addend: no clear per PT. hangingout today

## 2021-12-20 VITALS
BODY MASS INDEX: 34.1 KG/M2 | SYSTOLIC BLOOD PRESSURE: 145 MMHG | WEIGHT: 192.46 LBS | TEMPERATURE: 98.4 F | HEART RATE: 71 BPM | OXYGEN SATURATION: 91 % | RESPIRATION RATE: 16 BRPM | DIASTOLIC BLOOD PRESSURE: 69 MMHG | HEIGHT: 63 IN

## 2021-12-20 LAB
ANION GAP SERPL CALC-SCNC: 4 MMOL/L (ref 5–15)
BUN SERPL-MCNC: 17 MG/DL (ref 6–20)
BUN/CREAT SERPL: 20 (ref 12–20)
CALCIUM SERPL-MCNC: 9 MG/DL (ref 8.5–10.1)
CHLORIDE SERPL-SCNC: 105 MMOL/L (ref 97–108)
CO2 SERPL-SCNC: 30 MMOL/L (ref 21–32)
CREAT SERPL-MCNC: 0.85 MG/DL (ref 0.55–1.02)
GLUCOSE SERPL-MCNC: 99 MG/DL (ref 65–100)
HGB BLD-MCNC: 11.8 G/DL (ref 11.5–16)
POTASSIUM SERPL-SCNC: 3.9 MMOL/L (ref 3.5–5.1)
SODIUM SERPL-SCNC: 139 MMOL/L (ref 136–145)

## 2021-12-20 PROCEDURE — 36415 COLL VENOUS BLD VENIPUNCTURE: CPT

## 2021-12-20 PROCEDURE — 74011250637 HC RX REV CODE- 250/637: Performed by: PHYSICIAN ASSISTANT

## 2021-12-20 PROCEDURE — 97110 THERAPEUTIC EXERCISES: CPT

## 2021-12-20 PROCEDURE — 97116 GAIT TRAINING THERAPY: CPT

## 2021-12-20 PROCEDURE — 97530 THERAPEUTIC ACTIVITIES: CPT

## 2021-12-20 PROCEDURE — 80048 BASIC METABOLIC PNL TOTAL CA: CPT

## 2021-12-20 PROCEDURE — 85018 HEMOGLOBIN: CPT

## 2021-12-20 RX ORDER — OXYCODONE HYDROCHLORIDE 5 MG/1
5-10 TABLET ORAL
Qty: 40 TABLET | Refills: 0 | Status: SHIPPED | OUTPATIENT
Start: 2021-12-20 | End: 2021-12-27

## 2021-12-20 RX ORDER — FUROSEMIDE 20 MG/1
20 TABLET ORAL DAILY
Qty: 90 TABLET | Refills: 3 | Status: SHIPPED
Start: 2021-12-20 | End: 2021-12-20 | Stop reason: SDUPTHER

## 2021-12-20 RX ORDER — ASPIRIN 81 MG/1
81 TABLET ORAL 2 TIMES DAILY
Qty: 60 TABLET | Refills: 0 | Status: SHIPPED
Start: 2021-12-20 | End: 2022-01-19

## 2021-12-20 RX ORDER — AMOXICILLIN 250 MG
1 CAPSULE ORAL 2 TIMES DAILY
Qty: 14 TABLET | Refills: 0 | Status: SHIPPED | OUTPATIENT
Start: 2021-12-20 | End: 2021-12-27

## 2021-12-20 RX ORDER — POLYETHYLENE GLYCOL 3350 17 G/17G
17 POWDER, FOR SOLUTION ORAL DAILY
Qty: 7 PACKET | Refills: 0 | Status: SHIPPED | OUTPATIENT
Start: 2021-12-21 | End: 2021-12-28

## 2021-12-20 RX ORDER — FUROSEMIDE 20 MG/1
20 TABLET ORAL
Qty: 90 TABLET | Refills: 3 | Status: SHIPPED
Start: 2021-12-20

## 2021-12-20 RX ADMIN — ACETAMINOPHEN 650 MG: 325 TABLET ORAL at 05:56

## 2021-12-20 RX ADMIN — ACETAMINOPHEN 650 MG: 325 TABLET ORAL at 01:24

## 2021-12-20 RX ADMIN — AMLODIPINE BESYLATE 5 MG: 5 TABLET ORAL at 08:15

## 2021-12-20 RX ADMIN — OXYCODONE 10 MG: 5 TABLET ORAL at 11:55

## 2021-12-20 RX ADMIN — OXYCODONE 10 MG: 5 TABLET ORAL at 08:15

## 2021-12-20 RX ADMIN — Medication 10 ML: at 05:56

## 2021-12-20 RX ADMIN — LEVOTHYROXINE SODIUM 25 MCG: 0.03 TABLET ORAL at 05:56

## 2021-12-20 RX ADMIN — ASPIRIN 81 MG: 81 TABLET, COATED ORAL at 08:14

## 2021-12-20 RX ADMIN — ACETAMINOPHEN 650 MG: 325 TABLET ORAL at 11:55

## 2021-12-20 RX ADMIN — FUROSEMIDE 20 MG: 20 TABLET ORAL at 08:14

## 2021-12-20 RX ADMIN — OXYCODONE 10 MG: 5 TABLET ORAL at 01:25

## 2021-12-20 RX ADMIN — METOPROLOL SUCCINATE 25 MG: 25 TABLET, EXTENDED RELEASE ORAL at 08:14

## 2021-12-20 RX ADMIN — OXYCODONE 10 MG: 5 TABLET ORAL at 04:55

## 2021-12-20 RX ADMIN — POLYETHYLENE GLYCOL 3350 17 G: 17 POWDER, FOR SOLUTION ORAL at 08:14

## 2021-12-20 RX ADMIN — FAMOTIDINE 20 MG: 20 TABLET ORAL at 08:14

## 2021-12-20 RX ADMIN — DOCUSATE SODIUM 50MG AND SENNOSIDES 8.6MG 1 TABLET: 8.6; 5 TABLET, FILM COATED ORAL at 08:14

## 2021-12-20 NOTE — PROGRESS NOTES
DISCHARGE NOTE FROM [facility]    Patient determined to be stable for discharge by attending provider. I have reviewed the discharge instructions with the patient. They verbalized understanding and all questions were answered to their satisfaction. No complaints or further questions were expressed. Medications sent to pharmacy. Appropriate educational materials and medication side effect teaching were provided. PIV were removed prior to discharge. Patient did not discharge with any line, hyde, or drain. Personal items and valuables accounted for at discharge by patient and/or family: YES    Post-op patient: Yes- Patient given post-op discharge kit and instructed on use.        Eliodoro Sicard

## 2021-12-20 NOTE — PROGRESS NOTES
Ortho / Neurosurgery NP Note    POD# 3  s/p RIGHT TOTAL KNEE ARTHROPLASTY WITH NAVIGATION   Pt seen with no visitor present. Pt resting in bed. Awake and alert, in NAD. Reports expected post-op pain and \"heaviness\" in RLE  Pain well controlled with prn oxycodone  Tolerating regular diet. No nausea. No BM since admission, pt reports hx constipation PTA     VSS Afebrile. Room air. Visit Vitals  BP (!) 128/54   Pulse 71   Temp 98.3 °F (36.8 °C)   Resp 16   Ht 5' 3\" (1.6 m)   Wt 87.3 kg (192 lb 7.4 oz)   SpO2 94%   BMI 34.09 kg/m²       Voiding status: voiding - purwick   Output (mL)  Urine Voided: 1200 ml (12/20/21 8167)      Labs    Lab Results   Component Value Date/Time    HGB 11.8 12/20/2021 03:48 AM      Lab Results   Component Value Date/Time    INR 1.0 12/08/2021 11:05 AM      Lab Results   Component Value Date/Time    Sodium 139 12/20/2021 03:48 AM    Potassium 3.9 12/20/2021 03:48 AM    Chloride 105 12/20/2021 03:48 AM    CO2 30 12/20/2021 03:48 AM    Glucose 99 12/20/2021 03:48 AM    BUN 17 12/20/2021 03:48 AM    Creatinine 0.85 12/20/2021 03:48 AM    Calcium 9.0 12/20/2021 03:48 AM     Recent Glucose Results:   Lab Results   Component Value Date/Time    GLU 99 12/20/2021 03:48 AM           Body mass index is 34.09 kg/m². : A BMI > 30 is classified as obesity and > 40 is classified as morbid obesity. Dressing c.d.i  Cryotherapy in place over incision  Calves soft and supple; No pain with passive stretch  Sensation and motor intact   SCDs for mechanical DVT proph while in bed  LLE 1+ edema   RLE 2+ pitting edema      PLAN:  1) PT BID - WBAT  2) Aspirin 81 mg PO BID for DVT Prophylaxis   3) GI Prophylaxis - Pepcid  4) BLE edema - Nephrology consulted. Can resume lasix prn at discharge and f/u with Dr Darryl Smith in 3-4 weeks.    5) Readniess for discharge:     [x] Vital Signs stable    [x] Hgb stable    [x] + Voiding    [x] Wound intact, drainage minimal    [x] Tolerating PO intake     [] Cleared by PT (OT if applicable)     [] Stair training completed (if applicable)    [] Independent / Contact Guard Assist (household distance)     [] Bed mobility     [] Car transfers     [] ADLs    [x] Adequate pain control on oral medication alone     Discharge home today pending therapy clearance. Plans to return home with support of daughter and .      Tena Miller NP

## 2021-12-20 NOTE — PROGRESS NOTES
End of Shift Note    Bedside shift change report given to [facility] RN (oncoming nurse) by Ritchie Campos (offgoing nurse). Report included the following information SBAR, Kardex, Intake/Output, MAR, Accordion, Recent Results and Med Rec Status    Shift worked:  night     Shift summary and any significant changes:     no changes overnight     Concerns for physician to address:  none     Zone phone for oncoming shift:   [phone]       Activity:  Activity Level: Up with Assistance  Number times ambulated in hallways past shift: 0  Number of times OOB to chair past shift: 0    Cardiac:   Cardiac Monitoring: Yes      Cardiac Rhythm: Sinus Rhythm    Access:   Current line(s): PIV     Genitourinary:   Urinary status: voiding and external catheter    Respiratory:   O2 Device: None (Room air)  Chronic home O2 use?: NO  Incentive spirometer at bedside: YES  Actual Volume (ml): 1000 ml  GI:     Current diet:  ADULT DIET Regular  Passing flatus: YES  Tolerating current diet: YES       Pain Management:   Patient states pain is manageable on current regimen: YES    Skin:  Noe Score: 18  Interventions: float heels and increase time out of bed    Patient Safety:  Fall Score:  Total Score: 3  Interventions: assistive device (walker, cane, etc), gripper socks and pt to call before getting OOB  High Fall Risk: Yes    Length of Stay:  Expected LOS: - - -  Actual LOS: [address] [facility]

## 2021-12-20 NOTE — PROGRESS NOTES
Problem: Mobility Impaired (Adult and Pediatric)  Goal: *Acute Goals and Plan of Care (Insert Text)  Description: FUNCTIONAL STATUS PRIOR TO ADMISSION: Patient was independent and active without use of DME.    HOME SUPPORT PRIOR TO ADMISSION: The patient lived with  but did not require assist. 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 Physical Therapy Goals  Initiated 12/18/2021  1. Patient will move from supine to sit and sit to supine  in bed with independence within 7 day(s). 2.  Patient will transfer from bed to chair and chair to bed with modified independence using the least restrictive device within 7 day(s). 3.  Patient will perform sit to stand with modified independence within 7 day(s). 4.  Patient will ambulate with modified independence for 50 feet with the least restrictive device within 7 day(s). 5.  Patient will ascend/descend 4 stairs with left handrail(s) with supervision/set-up within 7 day(s). Outcome: Resolved/Met   PHYSICAL THERAPY TREATMENT  Patient: Rachel Ho (37 y.o. female)  Date: 12/20/2021  Diagnosis: Status post total knee replacement [Z96.659] <principal problem not specified>  Procedure(s) (LRB):  RIGHT TOTAL KNEE ARTHROPLASTY WITH NAVIGATION (Right) 3 Days Post-Op  Precautions: WBAT  Chart, physical therapy assessment, plan of care and goals were reviewed. ASSESSMENT  Patient continues with skilled PT services and is progressing towards goals. Pt presents with decreased strength and ROM. Pt performed supine exercises with assistance. Pt performed supine to sit at min  A for RLE. Pt able to stand at [facility]/SBA with cueing for hand placement. Pt ambulated 120ft and 70ft with RW at [facility]/SBA. Pt requiring cueing to improve step length on LLE  to improve step through gait. Pt with slow alfredo and unable to improve. Pt ascended/descended 4 steps with the left railing and SPC on right. Pt requiring cueing for sequencing.  Pt performed a car transfer at [place] [address] with cueing for sequencing. Pt moving well and improved with time. From physical therapy stand point pt cleared for discharge. Current Level of Function Impacting Discharge (mobility/balance): bed mobility at min A for RLE, sit to stand transfer at [facility]/SBA, ambulation at [facility]/SBA     Other factors to consider for discharge: pain, decreased strength and endurance. PLAN :  Patient continues to benefit from skilled intervention to address the above impairments. Continue treatment per established plan of care. to address goals. Recommendation for discharge: (in order for the patient to meet his/her long term goals)  Physical therapy at least 2 days/week in the home     This discharge recommendation:  Has been made in collaboration with the attending provider and/or case management    IF patient discharges home will need the following DME: rolling walker       SUBJECTIVE:   Patient stated  I want to go home.     OBJECTIVE DATA SUMMARY:   Critical Behavior:  Neurologic State: Alert,Appropriate for age  Orientation Level: Oriented X4  Cognition: Follows commands,Appropriate safety awareness,Appropriate for age attention/concentration  Safety/Judgement: Awareness of environment,Fall prevention,Insight into deficits    Range of Motion:         AROM: Generally decreased, Functional                    Functional Mobility Training:  Bed Mobility:     Supine to Sit: Minimum assistance; Additional time (for RLE )     Scooting: Contact guard assistance;Minimum assistance; Additional time        Transfers:  Sit to Stand: Contact guard assistance;Stand-by assistance  Stand to Sit: Stand-by assistance                             Balance:  Sitting: Intact  Standing: Intact; With support  Standing - Static: Good;Constant support  Standing - Dynamic : Fair;Constant support  Ambulation/Gait Training:  Distance (ft): 190 Feet (ft) (120ft and 70ft)  Assistive Device: Gait belt;Walker, rolling  Ambulation - Level of Assistance: Contact guard assistance;Stand-by assistance        Gait Abnormalities: Decreased step clearance        Base of Support: Widened     Speed/Kadi: Slow  Step Length: Right shortened;Left shortened           Stairs:  Number of Stairs Trained: 4  Stairs - Level of Assistance: Contact guard assistance   Rail Use: Left  (SPC on right)    Therapeutic Exercises:     EXERCISE   Sets   Reps   Active Active Assist   Passive Self ROM   Comments   Ankle Pumps 1 10 [x]                                        []                                        []                                        []                                           Quad Sets 1 5 [x]                                        []                                        []                                        []                                           Hip abduction  1 10 [x]                                        []                                        []                                        []                                           Short Arc Quads   []                                        []                                        []                                        []                                           Knee Extension Stretch     []                                          []                                          []                                          []                                           Heel Slides 1 10 [x]                                        []                                        []                                        []                                           Long Arc Quads   []                                        []                                        []                                        []                                           Knee Flexion Stretch   []                                        []                                        []                                        [] Straight Leg Raises 1 10 [x]                                        []                                        []                                        []                                               Pain Rating:  Pt with minimal complaints of pain     Activity Tolerance:   WNL, Good, and requires rest breaks    After treatment patient left in no apparent distress:   Sitting in chair and Call bell within reach    COMMUNICATION/COLLABORATION:   The patients plan of care was discussed with: Registered nurse.      Ruby Irby PTA   Time Calculation: 54 mins

## 2021-12-20 NOTE — PROGRESS NOTES
Edema better. On lasix    OK for DC. I told her she should resume her lasix PRN as an outpatient. I asked her to get a follow up with Dr. Bruno Loud in the next month or so. I will sign off. Please call if any questions.

## 2021-12-20 NOTE — PROGRESS NOTES
Problem: Pressure Injury - Risk of  Goal: *Prevention of pressure injury  Description: Document Noe Scale and appropriate interventions in the flowsheet. Outcome: Progressing Towards Goal  Note: Pressure Injury Interventions:  Sensory Interventions: Assess changes in LOC,Assess need for specialty bed,Avoid rigorous massage over bony prominences,Chair cushion,Check visual cues for pain,Float heels,Discuss PT/OT consult with provider,Keep linens dry and wrinkle-free,Maintain/enhance activity level,Minimize linen layers,Monitor skin under medical devices,Pressure redistribution bed/mattress (bed type),Pad between skin to skin    Moisture Interventions: Absorbent underpads    Activity Interventions: Increase time out of bed    Mobility Interventions: Assess need for specialty bed    Nutrition Interventions: Document food/fluid/supplement intake    Friction and Shear Interventions: Lift sheet                Problem: Falls - Risk of  Goal: *Absence of Falls  Description: Document Danne Lobe Fall Risk and appropriate interventions in the flowsheet.   Outcome: Progressing Towards Goal  Note: Fall Risk Interventions:  Mobility Interventions: Patient to call before getting OOB         Medication Interventions: Patient to call before getting OOB    Elimination Interventions: Call light in reach    History of Falls Interventions: Room close to nurse's station

## 2021-12-20 NOTE — PROGRESS NOTES
Transition of Care Plan:  RUR: 6%  Disposition: Home with [address]   Follow up appointments: ortho  DME needed: Pt has her own crutches, walker, and cane   Transportation at Discharge: daughter   [address] or means to access home:  Has keys      IM Medicare Letter: provided   Is patient a BCPI-A Bundle:  No                  If yes, was Bundle Letter given?:    Is patient a [place] and connected with the [address] [place] [address]  If yes, was [employer] transfer form completed and [place] notified? Caregiver Contact:  Mala Hill [phone]     acknowledged d/c order. Pt to d/c home with [facility]. Medicare pt has received, reviewed, and signed 2nd  letter informing them of their right to appeal the discharge. Signed copy has been placed on pt bedside chart.  Per patient her daughter will be here to transport pt home around 12:30pm.     Jw Dee, [address], [address]

## 2021-12-20 NOTE — DISCHARGE SUMMARY
Ortho Discharge Summary    Patient ID:  Donell Patel  [id number]  female  78 y.o.  1945    Admit date: 12/17/2021    Discharge date: 12/20/2021    Admitting Physician: Michael Temple MD     Consulting Physician(s):   Treatment Team: Attending Provider: Ashley Low MD; Consulting Provider: Poornima Campbell MD; Utilization Review: Graeme Lei RN; Care Manager: Jamar Germain; Staff Nurse: Yury See    Date of Surgery:   12/17/2021     Preoperative Diagnosis:  RIGHT KNEE OSTEOARTHRITIS    Postoperative Diagnosis:   RIGHT KNEE OSTEOARTHRITIS    Procedure(s):   RIGHT TOTAL KNEE ARTHROPLASTY WITH NAVIGATION     Anesthesia Type:   General     Surgeon: Ashley Low MD                            HPI:  Pt is a 78 y.o. female who has a history of RIGHT KNEE OSTEOARTHRITIS  with pain and limitations of activities of daily living who presents at this time for a RIGHT TOTAL [address] following the failure of conservative management. PMH:   Past Medical History:   Diagnosis Date    Adverse effect of anesthesia     hard to wake up after surgery in   1994  broken   leg and broken   ankle        Aortic stenosis     Arthritis     back,feet    Chronic pain     back,feet    GERD (gastroesophageal reflux disease)     Hyperlipidemia     Hypertension     Murmur     Obesity     Other ill-defined conditions(799.89)     heart murmur     Stroke ([facility] [address] 1994    TIA, had aphagia and has resolved     Thyroid disease     Unspecified adverse effect of anesthesia     slow to wake up    Valvular heart disease        Body mass index is 34.09 kg/m². : A BMI > 30 is classified as obesity and > 40 is classified as morbid obesity. Medications upon admission :   Prior to Admission Medications   Prescriptions Last Dose Informant Patient Reported? Taking? B.infantis-B.ani-B.long-B.bifi (Probiotic 4X) 10-15 mg TbEC 12/11/2021  Yes No   Sig: Take 1 Capsule by mouth daily.    DULoxetine (CYMBALTA) 20 mg capsule 12/16/2021 at 2300  Yes Yes   Sig: Take 20 mg by mouth nightly. FOLIC ACID/MULTIVITS-MIN (ONE DAILY GUMMY VITES PO) 12/11/2021  Yes No   Sig: Take 2 Tabs by mouth two (2) times a day. Phenylephrine-DM-Acetaminophen (Daytime Cold-Flu) 5- mg/15 mL liqd 12/8/2021  Yes No   Sig: Take  by mouth as needed. acetaminophen (TYLENOL) 500 mg tablet 12/16/2021 at 2300  Yes No   Sig: Take 1,000 mg by mouth three (3) times daily. amLODIPine (NORVASC) 5 mg tablet 12/17/2021 at 0830  No Yes   Sig: TAKE 1 TABLET BY MOUTH EVERY DAY   Patient taking differently: Take 5 mg by mouth daily. ascorbic acid-vitamin E-biotin (HAIR,SKIN & NAILS WITH BIOTIN) 7.5-7.5-1,250 mg-unit-mcg chew 12/11/2021  Yes No   Sig: Take  by mouth two (2) times a day. aspirin delayed-release 81 mg tablet 12/11/2021  Yes No   Sig: Take 81 mg by mouth daily. b complex vitamins tablet 12/8/2021  Yes No   Sig: Take 1 Tablet by mouth daily. biotin 10,000 mcg cap 12/11/2021  Yes No   Sig: Take 2 Tablets by mouth every evening. diclofenac (VOLTAREN) 1 % gel 12/11/2021  Yes No   Sig: Apply  to affected area four (4) times daily. famotidine (Pepcid) 20 mg tablet Not Taking at Unknown time  Yes No   Sig: Take 20 mg by mouth daily as needed for Heartburn. Patient not taking: Reported on 12/17/2021   ferrous fumarate/vit Bcomp,C (SUPER B COMPLEX PO) 12/11/2021  Yes No   Sig: Take 1 Tablet by mouth every other day. Takes every other night   fexofenadine (ALLEGRA) 180 mg tablet 12/7/2021  Yes No   Sig: Take 180 mg by mouth every thirty (30) days. When getting allergy injections   fluticasone propion-salmeteroL (Advair HFA) 230-21 mcg/actuation inhaler Not Taking at Unknown time  Yes No   Sig: Take 2 Puffs by inhalation two (2) times daily as needed.    Patient not taking: Reported on 12/17/2021   furosemide (LASIX) 20 mg tablet 12/15/2021  No No   Sig: TAKE 1 TABLET BY MOUTH EVERY DAY   Patient taking differently: Take 20 mg by mouth daily as needed. levothyroxine (SYNTHROID) 25 mcg tablet 12/17/2021 at 0830  No Yes   Sig: TAKE 1 TABLET BY MOUTH EVERY DAY BEFORE BREAKFAST   Patient taking differently: Take 25 mcg by mouth Daily (before breakfast). magnesium hydroxide (Dulcolax, magnesium hydroxide,) 1,200 mg chew Unknown at Unknown time  Yes No   Sig: Take  by mouth as needed. metoprolol succinate (TOPROL-XL) 25 mg XL tablet 12/16/2021 at 2330  No Yes   Sig: TAKE 1 TABLET BY MOUTH EVERY DAY   Patient taking differently: Take 25 mg by mouth daily. multivit-minerals/folic acid (WOMEN'S MULTIVITAMIN GUMMIES PO) 12/11/2021  Yes No   Sig: Take 2 Tablets by mouth two (2) times a day. omega 3-DHA-EPA-fish oil 1,000 mg (120 mg-180 mg) capsule 12/11/2021  Yes No   Sig: Take 1 Capsule by mouth daily. Omega3  bar red   oxybutynin chloride XL (DITROPAN XL) 15 mg CR tablet 12/16/2021 at 1830  Yes No   Sig: Take 15 mg by mouth daily. vit A/vit C/vit E/zinc/copper (PRESERVISION AREDS PO) 12/11/2021  Yes No   Sig: Take 2 Tablets by mouth daily. vitamin E (AQUA GEMS) 400 unit capsule 12/11/2021  Yes No   Sig: Take 800 Units by mouth daily. vitamin c-vitamin e (CRANBERRY CONCENTRATE) Cap 12/11/2021  Yes No   Sig: Take 450 mg by mouth daily. Facility-Administered Medications: None        Allergies: Allergies   Allergen Reactions    Other Food Nausea and Vomiting     Black eye peas    Statins-Hmg-Coa Reductase Inhibitors Other (comments)     Severe pain and weakness    Amoxicillin Hives    Colestid [Colestipol] Myalgia    Gabapentin Other (comments)     Edema, GI problem    Pcn [Penicillins] Hives    Pepper (Genus Capsicum) Nausea Only     Upset stomach    Pravastatin Myalgia     Severe pain and weakness with all statins        Hospital Course: The patient underwent surgery. Complications:  None; patient tolerated the procedure well. Was taken to the PACU in stable condition and then transferred to the ortho floor. Perioperative Antibiotics:  Ancef     Postoperative Pain Management:  Oxycodone & Tylenol     DVT Prophylaxis: Aspirin 81 BID    Postoperative transfusions:    Number of units banked PRBCs =   none     Post Op complications: none    Hemoglobin at discharge:    Lab Results   Component Value Date/Time    HGB 11.8 12/20/2021 03:48 AM    INR 1.0 12/08/2021 11:05 AM       Dressing remained clean, dry and intact. No significant erythema or swelling. Wound appears to be healing without any evidence of infection. Neurovascular exam found to be within normal limits. Physical Therapy started following surgery and participated in bed mobility, transfers and ambulation. Gait:  Gait  Base of Support: Widened,Shift to left  Speed/Kadi: Slow  Step Length: Left shortened  Swing Pattern: Right asymmetrical  Stance: Right decreased  Gait Abnormalities: Antalgic,Decreased step clearance  Ambulation - Level of Assistance: Contact guard assistance  Distance (ft): 45 Feet (ft)  Assistive Device: Walker, rolling,Gait belt  Interventions: Safety awareness training,Verbal cues            Interventions: Safety awareness training,Verbal cues      Discharged to: Home with . Condition on Discharge:   stable    Discharge instructions:  - Anticoagulate with Aspirin 81 BID  - Take pain medications as prescribed  - Resume pre hospital diet      - Discharge activity: activity as tolerated  - Ambulate with assistive device as needed. - Weight bearing status - WBAT  - Wound Care Keep wound clean and dry. See discharge instruction sheet. -DISCHARGE MEDICATION LIST     Current Discharge Medication List      START taking these medications    Details   oxyCODONE IR (ROXICODONE) 5 mg immediate release tablet Take 1-2 Tablets by mouth every four (4) hours as needed for Pain for up to 7 days. Max Daily Amount: 60 mg.  One tab for mild to moderate pain level 1-6, or 2 tabs for severe pain level 7-10  Qty: 40 Tablet, Refills: 0  Start date: 12/20/2021, End date: 12/27/2021    Associated Diagnoses: Status post total right knee replacement      senna-docusate (PERICOLACE) 8.6-50 mg per tablet Take 1 Tablet by mouth two (2) times a day for 7 days. Qty: 14 Tablet, Refills: 0  Start date: 12/20/2021, End date: 12/27/2021      polyethylene glycol (MIRALAX) 17 gram packet Take 1 Packet by mouth daily for 7 days. Qty: 7 Packet, Refills: 0  Start date: 12/21/2021, End date: 12/28/2021         CONTINUE these medications which have CHANGED    Details   aspirin delayed-release 81 mg tablet Take 1 Tablet by mouth two (2) times a day for 30 days. Qty: 60 Tablet, Refills: 0  Start date: 12/20/2021, End date: 1/19/2022      furosemide (LASIX) 20 mg tablet Take 1 Tablet by mouth daily as needed for PRN Reason (Other) (Edema). Indications: visible water retention  Qty: 90 Tablet, Refills: 3  Start date: 12/20/2021         CONTINUE these medications which have NOT CHANGED    Details   DULoxetine (CYMBALTA) 20 mg capsule Take 20 mg by mouth nightly. amLODIPine (NORVASC) 5 mg tablet TAKE 1 TABLET BY MOUTH EVERY DAY  Qty: 90 Tablet, Refills: 3      metoprolol succinate (TOPROL-XL) 25 mg XL tablet TAKE 1 TABLET BY MOUTH EVERY DAY  Qty: 90 Tablet, Refills: 3      levothyroxine (SYNTHROID) 25 mcg tablet TAKE 1 TABLET BY MOUTH EVERY DAY BEFORE BREAKFAST  Qty: 90 Tablet, Refills: 1      fluticasone propion-salmeteroL (Advair HFA) 230-21 mcg/actuation inhaler Take 2 Puffs by inhalation two (2) times daily as needed. diclofenac (VOLTAREN) 1 % gel Apply  to affected area four (4) times daily. !! multivit-minerals/folic acid (WOMEN'S MULTIVITAMIN GUMMIES PO) Take 2 Tablets by mouth two (2) times a day. Phenylephrine-DM-Acetaminophen (Daytime Cold-Flu) 5- mg/15 mL liqd Take  by mouth as needed. !! ferrous fumarate/vit Bcomp,C (SUPER B COMPLEX PO) Take 1 Tablet by mouth every other day.  Takes every other night      biotin 10,000 mcg cap Take 2 Tablets by mouth every evening. vitamin E (AQUA GEMS) 400 unit capsule Take 800 Units by mouth daily. magnesium hydroxide (Dulcolax, magnesium hydroxide,) 1,200 mg chew Take  by mouth as needed. omega 3-DHA-EPA-fish oil 1,000 mg (120 mg-180 mg) capsule Take 1 Capsule by mouth daily. Omega3  bar red      !! b complex vitamins tablet Take 1 Tablet by mouth daily. famotidine (Pepcid) 20 mg tablet Take 20 mg by mouth daily as needed for Heartburn. oxybutynin chloride XL (DITROPAN XL) 15 mg CR tablet Take 15 mg by mouth daily. vit A/vit C/vit E/zinc/copper (PRESERVISION AREDS PO) Take 2 Tablets by mouth daily. acetaminophen (TYLENOL) 500 mg tablet Take 1,000 mg by mouth three (3) times daily. B.infantis-B.ani-B.long-B.bifi (Probiotic 4X) 10-15 mg TbEC Take 1 Capsule by mouth daily. ascorbic acid-vitamin E-biotin (HAIR,SKIN & NAILS WITH BIOTIN) 7.5-7.5-1,250 mg-unit-mcg chew Take  by mouth two (2) times a day. !! FOLIC ACID/MULTIVITS-MIN (ONE DAILY GUMMY VITES PO) Take 2 Tabs by mouth two (2) times a day. vitamin c-vitamin e (CRANBERRY CONCENTRATE) Cap Take 450 mg by mouth daily. fexofenadine (ALLEGRA) 180 mg tablet Take 180 mg by mouth every thirty (30) days. When getting allergy injections       ! ! - Potential duplicate medications found. Please discuss with provider.        per medical continuation form      -Follow up in office in 2 weeks      Signed:  Blanquita Manzanares NP  Orthopaedic Nurse Practitioner    12/20/2021  10:45 AM

## 2022-01-05 ENCOUNTER — TELEPHONE (OUTPATIENT)
Dept: INTERNAL MEDICINE CLINIC | Age: 77
End: 2022-01-05

## 2022-01-05 NOTE — TELEPHONE ENCOUNTER
----- Message from Virgil Kearney sent at 1/5/2022  2:21 PM EST -----  Subject: Message to Provider    QUESTIONS  Information for Provider? Pt had surg was December 17th and pt needs a   post op appt. Please give pt a call.   ---------------------------------------------------------------------------  --------------  CALL BACK INFO  What is the best way for the office to contact you? OK to leave message on   voicemail  Preferred Call Back Phone Number? [phone]  ---------------------------------------------------------------------------  --------------  SCRIPT ANSWERS  Relationship to Patient?  Self

## 2022-01-05 NOTE — TELEPHONE ENCOUNTER
Per last office note, patient is also due for annual CPE. Any particular date/time for availability?

## 2022-01-19 ENCOUNTER — OFFICE VISIT (OUTPATIENT)
Dept: INTERNAL MEDICINE CLINIC | Age: 77
End: 2022-01-19
Payer: MEDICARE

## 2022-01-19 VITALS
OXYGEN SATURATION: 96 % | BODY MASS INDEX: 33.35 KG/M2 | TEMPERATURE: 96.8 F | SYSTOLIC BLOOD PRESSURE: 135 MMHG | DIASTOLIC BLOOD PRESSURE: 69 MMHG | RESPIRATION RATE: 16 BRPM | HEIGHT: 63 IN | HEART RATE: 67 BPM | WEIGHT: 188.2 LBS

## 2022-01-19 DIAGNOSIS — I10 HYPERTENSION, UNSPECIFIED TYPE: Primary | ICD-10-CM

## 2022-01-19 DIAGNOSIS — E03.9 HYPOTHYROIDISM, UNSPECIFIED TYPE: ICD-10-CM

## 2022-01-19 DIAGNOSIS — Z00.00 MEDICARE ANNUAL WELLNESS VISIT, SUBSEQUENT: ICD-10-CM

## 2022-01-19 DIAGNOSIS — E78.00 PURE HYPERCHOLESTEROLEMIA: ICD-10-CM

## 2022-01-19 DIAGNOSIS — N18.30 STAGE 3 CHRONIC KIDNEY DISEASE, UNSPECIFIED WHETHER STAGE 3A OR 3B CKD (HCC): ICD-10-CM

## 2022-01-19 PROCEDURE — 1101F PT FALLS ASSESS-DOCD LE1/YR: CPT | Performed by: INTERNAL MEDICINE

## 2022-01-19 PROCEDURE — G8754 DIAS BP LESS 90: HCPCS | Performed by: INTERNAL MEDICINE

## 2022-01-19 PROCEDURE — G0463 HOSPITAL OUTPT CLINIC VISIT: HCPCS | Performed by: INTERNAL MEDICINE

## 2022-01-19 PROCEDURE — G8752 SYS BP LESS 140: HCPCS | Performed by: INTERNAL MEDICINE

## 2022-01-19 PROCEDURE — G8399 PT W/DXA RESULTS DOCUMENT: HCPCS | Performed by: INTERNAL MEDICINE

## 2022-01-19 PROCEDURE — G8417 CALC BMI ABV UP PARAM F/U: HCPCS | Performed by: INTERNAL MEDICINE

## 2022-01-19 PROCEDURE — 1111F DSCHRG MED/CURRENT MED MERGE: CPT | Performed by: INTERNAL MEDICINE

## 2022-01-19 PROCEDURE — 1090F PRES/ABSN URINE INCON ASSESS: CPT | Performed by: INTERNAL MEDICINE

## 2022-01-19 PROCEDURE — 99213 OFFICE O/P EST LOW 20 MIN: CPT | Performed by: INTERNAL MEDICINE

## 2022-01-19 PROCEDURE — G8510 SCR DEP NEG, NO PLAN REQD: HCPCS | Performed by: INTERNAL MEDICINE

## 2022-01-19 PROCEDURE — G8427 DOCREV CUR MEDS BY ELIG CLIN: HCPCS | Performed by: INTERNAL MEDICINE

## 2022-01-19 PROCEDURE — G0439 PPPS, SUBSEQ VISIT: HCPCS | Performed by: INTERNAL MEDICINE

## 2022-01-19 PROCEDURE — 3017F COLORECTAL CA SCREEN DOC REV: CPT | Performed by: INTERNAL MEDICINE

## 2022-01-19 PROCEDURE — G8536 NO DOC ELDER MAL SCRN: HCPCS | Performed by: INTERNAL MEDICINE

## 2022-01-19 RX ORDER — DULOXETIN HYDROCHLORIDE 30 MG/1
CAPSULE, DELAYED RELEASE ORAL
COMMUNITY
Start: 2022-01-01

## 2022-01-19 NOTE — PROGRESS NOTES
HISTORY OF PRESENT ILLNESS  Daria Bejarano is a 78 y.o. female. HPI    f/u HTN HLD ( statin intolerant)  hypothyroidism, chronic pain r/t oa of hands , back and feet, obesity (BMI 32) and medicare wellness----  S/p right TKA last month-getting [facility] PT  Not in pain now  Right lower leg is edematous but improving  Had large amount of b/l leg edema prior to surgery--started on lasix 20 mg every day which has helped per pt  On cymbalta for back pain and takes tylenol  Last creatine  down to 0.85-sees renal MD yearly  Sees card MD for hs mild AS      Last OV       Saw Dr Charan Sanchez for mild AS--stable on echo--ef wnl. Neg stress test. toprol added for HTN and palpitations--home bp around 130-135/70     Saw Dr Joellyn Holstein abnormal renal function. Per pt kidneys were ok after med adjustments--stopped mobic and advil     Takes lasix once per week for control leg edema     On lyrica per Dr Juan M Arriaza stopped due to leg edema   Stopped mobic and all nsaids  Takes tramadol prn in back and knees--last refill 2/21--request to restart med since not atble to take nsaids and lyrica   Mostly back and right knee pain    Patient Active Problem List    Diagnosis Date Noted    Status post total knee replacement 12/17/2021    Nonrheumatic aortic valve stenosis 06/08/2017    Heart murmur 05/24/2016    Advanced care planning/counseling discussion 05/24/2016    DDD (degenerative disc disease), lumbar 10/04/2013    Obesity 04/05/2013    Essential hypertension, benign 03/31/2010    Pure hypercholesterolemia 03/31/2010    Overweight 03/31/2010    Hypothyroidism 03/31/2010    DJD (degenerative joint disease) 03/31/2010    Overactive bladder 03/31/2010    Diverticulosis 03/31/2010     Current Outpatient Medications   Medication Sig Dispense Refill    DULoxetine (CYMBALTA) 30 mg capsule       aspirin delayed-release 81 mg tablet Take 1 Tablet by mouth two (2) times a day for 30 days.  60 Tablet 0    furosemide (LASIX) 20 mg tablet Take 1 Tablet by mouth daily as needed for PRN Reason (Other) (Edema). Indications: visible water retention 90 Tablet 3    fluticasone propion-salmeteroL (Advair HFA) 230-21 mcg/actuation inhaler Take 2 Puffs by inhalation two (2) times daily as needed.  diclofenac (VOLTAREN) 1 % gel Apply  to affected area four (4) times daily.  multivit-minerals/folic acid (WOMEN'S MULTIVITAMIN GUMMIES PO) Take 2 Tablets by mouth two (2) times a day.  Phenylephrine-DM-Acetaminophen (Daytime Cold-Flu) 5- mg/15 mL liqd Take  by mouth as needed.  biotin 10,000 mcg cap Take 2 Tablets by mouth every evening.  vitamin E (AQUA GEMS) 400 unit capsule Take 800 Units by mouth daily.  magnesium hydroxide (Dulcolax, magnesium hydroxide,) 1,200 mg chew Take  by mouth as needed.  omega 3-DHA-EPA-fish oil 1,000 mg (120 mg-180 mg) capsule Take 1 Capsule by mouth daily. Omega3  bar red      b complex vitamins tablet Take 1 Tablet by mouth every other day.  famotidine (Pepcid) 20 mg tablet Take 20 mg by mouth daily as needed for Heartburn.  amLODIPine (NORVASC) 5 mg tablet TAKE 1 TABLET BY MOUTH EVERY DAY (Patient taking differently: Take 5 mg by mouth daily.) 90 Tablet 3    metoprolol succinate (TOPROL-XL) 25 mg XL tablet TAKE 1 TABLET BY MOUTH EVERY DAY (Patient taking differently: Take 25 mg by mouth daily.) 90 Tablet 3    levothyroxine (SYNTHROID) 25 mcg tablet TAKE 1 TABLET BY MOUTH EVERY DAY BEFORE BREAKFAST (Patient taking differently: Take 25 mcg by mouth Daily (before breakfast). ) 90 Tablet 1    oxybutynin chloride XL (DITROPAN XL) 15 mg CR tablet Take 15 mg by mouth daily.  vit A/vit C/vit E/zinc/copper (PRESERVISION AREDS PO) Take 2 Tablets by mouth daily.  acetaminophen (TYLENOL) 500 mg tablet Take 1,000 mg by mouth three (3) times daily.  B.infantis-B.ani-B.long-B.bifi (Probiotic 4X) 10-15 mg TbEC Take 1 Capsule by mouth daily.       ascorbic acid-vitamin E-biotin (HAIR,SKIN & NAILS WITH BIOTIN) 7.5-7.5-1,250 mg-unit-mcg chew Take  by mouth two (2) times a day.  FOLIC ACID/MULTIVITS-MIN (ONE DAILY GUMMY VITES PO) Take 2 Tabs by mouth two (2) times a day.  vitamin c-vitamin e (CRANBERRY CONCENTRATE) Cap Take 450 mg by mouth daily.  ferrous fumarate/vit Bcomp,C (SUPER B COMPLEX PO) Take 1 Tablet by mouth every other day. Takes every other night (Patient not taking: Reported on 1/19/2022)      DULoxetine (CYMBALTA) 20 mg capsule Take 20 mg by mouth nightly. (Patient not taking: Reported on 1/19/2022)      fexofenadine (ALLEGRA) 180 mg tablet Take 180 mg by mouth every thirty (30) days.  When getting allergy injections (Patient not taking: Reported on 1/19/2022)       Allergies   Allergen Reactions    Other Food Nausea and Vomiting     Black eye peas    Statins-Hmg-Coa Reductase Inhibitors Other (comments)     Severe pain and weakness    Amoxicillin Hives    Colestid [Colestipol] Myalgia    Gabapentin Other (comments)     Edema, GI problem    Pcn [Penicillins] Hives    Pepper (Genus Capsicum) Nausea Only     Upset stomach    Pravastatin Myalgia     Severe pain and weakness with all statins      Lab Results   Component Value Date/Time    WBC 6.3 12/08/2021 11:05 AM    HGB 11.8 12/20/2021 03:48 AM    HCT 42.5 12/08/2021 11:05 AM    PLATELET 161 28/52/2301 11:05 AM    MCV 93.2 12/08/2021 11:05 AM     Lab Results   Component Value Date/Time    Cholesterol, total 236 (H) 09/02/2020 10:15 AM    Cholesterol (POC) 204 04/03/2012 10:59 AM    HDL Cholesterol 58 09/02/2020 10:15 AM    LDL, calculated 159 (H) 09/02/2020 10:15 AM    LDL, calculated 164 (H) 02/24/2020 12:33 PM    LDL Cholesterol (POC) 131 04/03/2012 10:59 AM    Triglyceride 109 09/02/2020 10:15 AM    Triglycerides (POC) 142 04/03/2012 10:59 AM     Lab Results   Component Value Date/Time    GFR est non-AA >60 12/20/2021 03:48 AM    GFR est AA >60 12/20/2021 03:48 AM    Creatinine 0.85 12/20/2021 03:48 AM BUN 17 12/20/2021 03:48 AM    Sodium 139 12/20/2021 03:48 AM    Potassium 3.9 12/20/2021 03:48 AM    Chloride 105 12/20/2021 03:48 AM    CO2 30 12/20/2021 03:48 AM     Lab Results   Component Value Date/Time    TSH 1.080 09/02/2020 10:15 AM      Lab Results   Component Value Date/Time    Glucose 99 12/20/2021 03:48 AM         ROS    Physical Exam  Vitals and nursing note reviewed. Constitutional:       Appearance: She is well-developed. She is obese. Comments: Appears stated age   HENT:      Head: Normocephalic. Right Ear: Tympanic membrane normal.      Left Ear: Tympanic membrane normal.   Cardiovascular:      Rate and Rhythm: Normal rate and regular rhythm. Heart sounds: Murmur heard. No friction rub. No gallop. Comments: 2/6 manjeet RUSB  Pulmonary:      Effort: Pulmonary effort is normal. No respiratory distress. Breath sounds: Normal breath sounds. No wheezing. Abdominal:      General: Bowel sounds are normal.      Palpations: Abdomen is soft. Musculoskeletal:      Right lower leg: Edema present. Comments: 2 plus tight RLE edema , mild erythema   Neurological:      General: No focal deficit present. Mental Status: She is alert. Psychiatric:         Mood and Affect: Mood normal.         Behavior: Behavior normal.         Thought Content: Thought content normal.         Judgment: Judgment normal.         ASSESSMENT and PLAN  Diagnoses and all orders for this visit:    1. Hypertension, unspecified type    2. Hypothyroidism, unspecified type  -     TSH 3RD GENERATION; Future    3. Stage 3 chronic kidney disease, unspecified whether stage 3a or 3b CKD ([facility] [address]    4. Pure hypercholesterolemia  -     TSH 3RD GENERATION; Future  -     LIPID PANEL; Future      Follow-up and Dispositions    · Return in about 6 months (around 7/19/2022) for htn hld hypothyroid .         This is the Subsequent Medicare Annual Wellness Exam, performed 12 months or more after the Initial AWV or the last Subsequent AWV    I have reviewed the patient's medical history in detail and updated the computerized patient record. Assessment/Plan   Education and counseling provided:  Are appropriate based on today's review and evaluation  recommended shingrix and covid booster   Declines mammogram     1. Hypertension, unspecified type-reasonable control  2. Hypothyroidism, unspecified type -tsh  3. Stage 3 chronic kidney disease, unspecified whether stage 3a or 3b CKD (HCC) --normal GFR on recent labs  4. Pure hypercholesterolemia-statin intolerant but check lipids  5. Leg edema --elevate, lasix every day  6. S/p right TKA--fu ortho MD  7. Aortic stenosis--fu at [facility]    Depression Risk Factor Screening     3 most recent PHQ Screens 1/19/2022   Little interest or pleasure in doing things Several days   Feeling down, depressed, irritable, or hopeless Several days   Total Score PHQ 2 2       Alcohol & Drug Abuse Risk Screen    Do you average more than 1 drink per night or more than 7 drinks a week:  No    On any one occasion in the past three months have you have had more than 3 drinks containing alcohol:  No          Functional Ability and Level of Safety    Hearing: Hearing is good. Activities of Daily Living: The home contains: no safety equipment. Patient needs help with:  transportation      Ambulation: with mild difficulty     Fall Risk:  Fall Risk Assessment, last 12 mths 1/19/2022   Able to walk? Yes   Fall in past 12 months? 0   Do you feel unsteady?  1   Are you worried about falling 1      Abuse Screen:  Patient is not abused       Cognitive Screening    Has your family/caregiver stated any concerns about your memory: no     Cognitive Screening: Normal - serial 3    Health Maintenance Due     Health Maintenance Due   Topic Date Due    Shingrix Vaccine Age 49> (1 of 2) Never done    Breast Cancer Screen Mammogram  11/27/2019    Medicare Yearly Exam  02/24/2021    COVID-19 Vaccine (3 - Booster for Pfizer series) 09/24/2021       Patient Care Team   Patient Care Team:  Emily Moore MD as PCP - General  Emily Moore MD as PCP [facility]  Alejandra Isbell MD (Gastroenterology)  Gilberto Ingram MD (Ophthalmology)  Jordyn Mandel MD (Optometry)  Duarte Nagy NP (Obstetrics & Gynecology)  Lev Manning MD as Physician (Cardiology)  Tisha Whittington NP as Nurse Practitioner (Nurse Practitioner)    History     Patient Active Problem List   Diagnosis Code    Essential hypertension, benign I10    Pure hypercholesterolemia E78.00    Overweight E66.3    Hypothyroidism E03.9    DJD (degenerative joint disease) M19.90    Overactive bladder N32.81    Diverticulosis K57.90    Obesity E66.9    DDD (degenerative disc disease), lumbar M51.36    Heart murmur R01.1    Advanced care planning/counseling discussion Z71.89    Nonrheumatic aortic valve stenosis I35.0    Status post total knee replacement Z96.659     Past Medical History:   Diagnosis Date    Adverse effect of anesthesia     hard to wake up after surgery in   1994  broken   leg and broken   ankle        Aortic stenosis     Arthritis     back,feet    Chronic pain     back,feet    GERD (gastroesophageal reflux disease)     Hyperlipidemia     Hypertension     Murmur     Obesity     Other ill-defined conditions(799.89)     heart murmur     Stroke ([facility] [address] 1994    TIA, had aphagia and has resolved     Thyroid disease     Unspecified adverse effect of anesthesia     slow to wake up    Valvular heart disease       Past Surgical History:   Procedure Laterality Date    COLONOSCOPY N/A 1/27/2020    COLONOSCOPY performed by Carmen Ivan [facility]    HX KNEE REPLACEMENT Right 12/17/2121    HX ORTHOPAEDIC Left     left ankle surgery due to fx    HX TONSILLECTOMY      HX UROLOGICAL  2013    bladder biopsy    TN COLONOSCOPY W/BIOPSY SINGLE/MULTIPLE  2/28/2014         TN EGD BALLOON DILATION ESOPHAGUS <30 MM DIAM  11/5/2012         [place] EGD TRANSORAL BIOPSY SINGLE/MULTIPLE  11/5/2012          Current Outpatient Medications   Medication Sig Dispense Refill    DULoxetine (CYMBALTA) 30 mg capsule       aspirin delayed-release 81 mg tablet Take 1 Tablet by mouth two (2) times a day for 30 days. 60 Tablet 0    furosemide (LASIX) 20 mg tablet Take 1 Tablet by mouth daily as needed for PRN Reason (Other) (Edema). Indications: visible water retention 90 Tablet 3    fluticasone propion-salmeteroL (Advair HFA) 230-21 mcg/actuation inhaler Take 2 Puffs by inhalation two (2) times daily as needed.  diclofenac (VOLTAREN) 1 % gel Apply  to affected area four (4) times daily.  multivit-minerals/folic acid (WOMEN'S MULTIVITAMIN GUMMIES PO) Take 2 Tablets by mouth two (2) times a day.  Phenylephrine-DM-Acetaminophen (Daytime Cold-Flu) 5- mg/15 mL liqd Take  by mouth as needed.  biotin 10,000 mcg cap Take 2 Tablets by mouth every evening.  vitamin E (AQUA GEMS) 400 unit capsule Take 800 Units by mouth daily.  magnesium hydroxide (Dulcolax, magnesium hydroxide,) 1,200 mg chew Take  by mouth as needed.  omega 3-DHA-EPA-fish oil 1,000 mg (120 mg-180 mg) capsule Take 1 Capsule by mouth daily. Omega3  bar red      b complex vitamins tablet Take 1 Tablet by mouth every other day.  famotidine (Pepcid) 20 mg tablet Take 20 mg by mouth daily as needed for Heartburn.  amLODIPine (NORVASC) 5 mg tablet TAKE 1 TABLET BY MOUTH EVERY DAY (Patient taking differently: Take 5 mg by mouth daily.) 90 Tablet 3    metoprolol succinate (TOPROL-XL) 25 mg XL tablet TAKE 1 TABLET BY MOUTH EVERY DAY (Patient taking differently: Take 25 mg by mouth daily.) 90 Tablet 3    levothyroxine (SYNTHROID) 25 mcg tablet TAKE 1 TABLET BY MOUTH EVERY DAY BEFORE BREAKFAST (Patient taking differently: Take 25 mcg by mouth Daily (before breakfast). ) 90 Tablet 1    oxybutynin chloride XL (DITROPAN XL) 15 mg CR tablet Take 15 mg by mouth daily.  vit A/vit C/vit E/zinc/copper (PRESERVISION AREDS PO) Take 2 Tablets by mouth daily.  acetaminophen (TYLENOL) 500 mg tablet Take 1,000 mg by mouth three (3) times daily.  B.infantis-B.ani-B.long-B.bifi (Probiotic 4X) 10-15 mg TbEC Take 1 Capsule by mouth daily.  ascorbic acid-vitamin E-biotin (HAIR,SKIN & NAILS WITH BIOTIN) 7.5-7.5-1,250 mg-unit-mcg chew Take  by mouth two (2) times a day.  FOLIC ACID/MULTIVITS-MIN (ONE DAILY GUMMY VITES PO) Take 2 Tabs by mouth two (2) times a day.  vitamin c-vitamin e (CRANBERRY CONCENTRATE) Cap Take 450 mg by mouth daily.  ferrous fumarate/vit Bcomp,C (SUPER B COMPLEX PO) Take 1 Tablet by mouth every other day. Takes every other night (Patient not taking: Reported on 1/19/2022)      DULoxetine (CYMBALTA) 20 mg capsule Take 20 mg by mouth nightly. (Patient not taking: Reported on 1/19/2022)      fexofenadine (ALLEGRA) 180 mg tablet Take 180 mg by mouth every thirty (30) days.  When getting allergy injections (Patient not taking: Reported on 1/19/2022)       Allergies   Allergen Reactions    Other Food Nausea and Vomiting     Black eye peas    Statins-Hmg-Coa Reductase Inhibitors Other (comments)     Severe pain and weakness    Amoxicillin Hives    Colestid [Colestipol] Myalgia    Gabapentin Other (comments)     Edema, GI problem    Pcn [Penicillins] Hives    Pepper (Genus Capsicum) Nausea Only     Upset stomach    Pravastatin Myalgia     Severe pain and weakness with all statins       Family History   Problem Relation Age of Onset    Alcohol abuse Mother     OSTEOARTHRITIS Mother     Cancer Father         testicular     Social History     Tobacco Use    Smoking status: Never Smoker    Smokeless tobacco: Never Used   Substance Use Topics    Alcohol use: No         Ron Sher MD

## 2022-01-19 NOTE — PATIENT INSTRUCTIONS
Office Policies    Phone calls/patient messages:            Please allow up to 24 hours for someone in the office to contact you about your call or message. Be mindful your provider may be out of the office or your message may require further review. We encourage you to use [employer] for your messages as this is a faster, more efficient way to communicate with our office                         Medication Refills:            Prescription medications require 48-72 business hours to process. We encourage you to use [employer] for your refills. For controlled medications: Please allow 72 business hours to process. Certain medications may require you to  a written prescription at our office. NO narcotic/controlled medications will be prescribed after 4pm Monday through Friday or on weekends              Form/Paperwork Completion:            Please note a $25 fee may incur for all paperwork for completed by our providers. We ask that you allow 7-10 business days. Pre-payment is due prior to picking up/faxing the completed form. You may also download your forms to [employer] to have your doctor print off. Medicare Wellness Visit, Female     The best way to live healthy is to have a lifestyle where you eat a well-balanced diet, exercise regularly, limit alcohol use, and quit all forms of tobacco/nicotine, if applicable. Regular preventive services are another way to keep healthy. Preventive services (vaccines, screening tests, monitoring & exams) can help personalize your care plan, which helps you manage your own care. Screening tests can find health problems at the earliest stages, when they are easiest to treat. [facility] follows the current, evidence-based guidelines published by the [facility] and [employer] (USPSTF) when recommending preventive services for our patients.  Because we follow these guidelines, sometimes recommendations change over time as research supports it. (For example, mammograms used to be recommended annually. Even though Medicare will still pay for an annual mammogram, the newer guidelines recommend a mammogram every two years for women of average risk). Of course, you and your doctor may decide to screen more often for some diseases, based on your risk and your co-morbidities (chronic disease you are already diagnosed with). Preventive services for you include:  - Medicare offers their members a free annual wellness visit, which is time for you and your primary care provider to discuss and plan for your preventive service needs. Take advantage of this benefit every year!  -All adults over the age of 72 should receive the recommended pneumonia vaccines. Current USPSTF guidelines recommend a series of two vaccines for the best pneumonia protection.   -All adults should have a flu vaccine yearly and a tetanus vaccine every 10 years.   -All adults age 48 and older should receive the shingles vaccines (series of two vaccines). -All adults age 38-68 who are overweight should have a diabetes screening test once every three years.   -All adults born between [birth year] and 1963 should be screened once for Hepatitis C.  -Other screening tests and preventive services for persons with diabetes include: an eye exam to screen for diabetic retinopathy, a kidney function test, a foot exam, and stricter control over your cholesterol.   -Cardiovascular screening for adults with routine risk involves an electrocardiogram (ECG) at intervals determined by your doctor.   -Colorectal cancer screenings should be done for adults age 54-65 with no increased risk factors for colorectal cancer. There are a number of acceptable methods of screening for this type of cancer. Each test has its own benefits and drawbacks. Discuss with your doctor what is most appropriate for you during your annual wellness visit.  The different tests include: colonoscopy (considered the best screening method), a fecal occult blood test, a fecal DNA test, and sigmoidoscopy.    -A bone mass density test is recommended when a woman turns 65 to screen for osteoporosis. This test is only recommended one time, as a screening. Some providers will use this same test as a disease monitoring tool if you already have osteoporosis. -Breast cancer screenings are recommended every other year for women of normal risk, age 54-69.  -Cervical cancer screenings for women over age 72 are only recommended with certain risk factors.      Here is a list of your current Health Maintenance items (your personalized list of preventive services) with a due date:  Health Maintenance Due   Topic Date Due    Shingles Vaccine (1 of 2) Never done    Mammogram  11/27/2019    Annual Well Visit  02/24/2021    COVID-19 Vaccine (3 - Booster for Pfizer series) 09/24/2021

## 2022-01-28 ENCOUNTER — APPOINTMENT (OUTPATIENT)
Dept: INTERNAL MEDICINE CLINIC | Age: 77
End: 2022-01-28

## 2022-01-28 DIAGNOSIS — E78.00 PURE HYPERCHOLESTEROLEMIA: ICD-10-CM

## 2022-01-28 DIAGNOSIS — E03.9 HYPOTHYROIDISM, UNSPECIFIED TYPE: ICD-10-CM

## 2022-01-28 LAB
CHOLEST SERPL-MCNC: 252 MG/DL
HDLC SERPL-MCNC: 48 MG/DL
HDLC SERPL: 5.3 {RATIO} (ref 0–5)
LDLC SERPL CALC-MCNC: 172.6 MG/DL (ref 0–100)
TRIGL SERPL-MCNC: 157 MG/DL (ref ?–150)
TSH SERPL DL<=0.05 MIU/L-ACNC: 1.11 UIU/ML (ref 0.36–3.74)
VLDLC SERPL CALC-MCNC: 31.4 MG/DL

## 2022-02-25 RX ORDER — LEVOTHYROXINE SODIUM 25 UG/1
TABLET ORAL
Qty: 90 TABLET | Refills: 1 | Status: SHIPPED | OUTPATIENT
Start: 2022-02-25 | End: 2022-04-08 | Stop reason: SDUPTHER

## 2022-03-18 PROBLEM — I35.0 NONRHEUMATIC AORTIC VALVE STENOSIS: Status: ACTIVE | Noted: 2017-06-08

## 2022-03-18 PROBLEM — Z96.659 STATUS POST TOTAL KNEE REPLACEMENT: Status: ACTIVE | Noted: 2021-12-17

## 2022-04-08 RX ORDER — LEVOTHYROXINE SODIUM 25 UG/1
25 TABLET ORAL
Qty: 90 TABLET | Refills: 1 | Status: SHIPPED | OUTPATIENT
Start: 2022-04-08 | End: 2022-09-27

## 2022-09-27 RX ORDER — LEVOTHYROXINE SODIUM 25 UG/1
TABLET ORAL
Qty: 90 TABLET | Refills: 1 | Status: SHIPPED | OUTPATIENT
Start: 2022-09-27

## 2022-11-01 RX ORDER — AMLODIPINE BESYLATE 5 MG/1
5 TABLET ORAL DAILY
Qty: 90 TABLET | Refills: 3 | Status: SHIPPED | OUTPATIENT
Start: 2022-11-01

## 2022-11-01 NOTE — TELEPHONE ENCOUNTER
Future Appointments:  No future appointments.      Last Appointment With Me:  Visit date not found     Requested Prescriptions     Pending Prescriptions Disp Refills    amLODIPine (NORVASC) 5 mg tablet 90 Tablet 3     Sig: TAKE 1 TABLET BY MOUTH EVERY DAY

## 2022-11-23 ENCOUNTER — TELEPHONE (OUTPATIENT)
Dept: INTERNAL MEDICINE CLINIC | Age: 77
End: 2022-11-23

## 2022-11-23 RX ORDER — METOPROLOL SUCCINATE 25 MG/1
25 TABLET, EXTENDED RELEASE ORAL DAILY
Qty: 90 TABLET | Refills: 3 | Status: SHIPPED | OUTPATIENT
Start: 2022-11-23

## 2023-03-27 RX ORDER — LEVOTHYROXINE SODIUM 25 UG/1
25 TABLET ORAL
Qty: 90 TABLET | Refills: 1 | Status: SHIPPED | OUTPATIENT
Start: 2023-03-27

## 2023-03-27 NOTE — TELEPHONE ENCOUNTER
PCP: Omer Aleman MD    Last appt: 1/28/2022  No future appointments. Requested Prescriptions     Pending Prescriptions Disp Refills    levothyroxine (SYNTHROID) 25 mcg tablet 90 Tablet 1     Sig: Take 1 Tablet by mouth Daily (before breakfast).

## 2023-04-24 RX ORDER — FUROSEMIDE 20 MG/1
20 TABLET ORAL
Qty: 90 TABLET | Refills: 3 | Status: SHIPPED | OUTPATIENT
Start: 2023-04-24

## 2023-05-03 ENCOUNTER — TELEPHONE (OUTPATIENT)
Dept: INTERNAL MEDICINE CLINIC | Age: 78
End: 2023-05-03

## 2023-10-12 NOTE — PROCEDURES
NAME:  Alejos Mohs   :   1945   MRN:   [id number]     Date/Time:  2020 7:48 AM    Esophagogastroduodenoscopy (EGD) Procedure Note    Procedure: Esophagogastroduodenoscopy with biopsy, esophageal dilation    Indication:  Dysphagia/odynophagia  Pre-operative Diagnosis: see indication above  Post-operative Diagnosis: see findings below  :  Babak Christiansen MD  Referring Provider:   --Nilam Puckett MD    Exam:  Airway: clear, no airway problems anticipated  Heart: RRR, without gallops or rubs  Lungs: clear bilaterally without wheezes, crackles, or rhonchi  Abdomen: soft, nontender, nondistended, bowel sounds present  Mental Status: awake, alert and oriented to person, place and time     Anethesia/Sedation:  MAC anesthesia Propofol  Procedure Details   After informed consent was obtained for the procedure, with all risks and benefits of procedure explained the patient was taken to the endoscopy suite and placed in the left lateral decubitus position. Following sequential administration of sedation as per above, the [id number] gastroscope was inserted into the mouth and advanced under direct vision to second portion of the duodenum. A careful inspection was made as the gastroscope was withdrawn, including a retroflexed view of the proximal stomach; findings and interventions are described below. Findings:   1. Normal proximal and mid esophagus. Biopsies taken to evaluate for Eosinophilic esophagitis  2. Mild Schatzki's ring. Dilated with a 54 Fr Savary  3. Small, sliding hiatal hernia  4. Moderate, patchy, non-erosive gastropathy in body and antrum with associated moderate amount of heme. Biopsied  5. Stomach otherwise normal, including retroflexion  6. Normal duodenal bulb and 2nd portion of the duodenum    Therapies:  1. Biopsies 2. 54 Fr Savary dilation    Specimens: 1. Gastric 2. Mid esophagus    EBL:  None.          Complications:   None; patient tolerated the procedure well.           Impression:    1. Normal proximal and mid esophagus. Biopsies taken to evaluate for Eosinophilic esophagitis  2. Mild Schatzki's ring. Dilated with a 54 Fr Savary  3. Small, sliding hiatal hernia  4. Moderate, patchy, non-erosive gastropathy in body and antrum with associated moderate amount of heme. Biopsied  5. Stomach otherwise normal, including retroflexion  6. Normal duodenal bulb and 2nd portion of the duodenum    Recommendations:  1. Follow up pathology    Discharge disposition:  For Colonoscopy    Grier Goldberg, MD Anesthesia Type: 1% lidocaine with epinephrine

## 2023-11-16 RX ORDER — AMLODIPINE BESYLATE 5 MG/1
5 TABLET ORAL DAILY
Qty: 90 TABLET | Refills: 0 | Status: SHIPPED | OUTPATIENT
Start: 2023-11-16

## 2023-11-16 RX ORDER — METOPROLOL SUCCINATE 25 MG/1
25 TABLET, EXTENDED RELEASE ORAL DAILY
Qty: 90 TABLET | Refills: 0 | Status: SHIPPED | OUTPATIENT
Start: 2023-11-16

## 2023-11-16 RX ORDER — LEVOTHYROXINE SODIUM 0.03 MG/1
25 TABLET ORAL
Qty: 90 TABLET | Refills: 0 | Status: SHIPPED | OUTPATIENT
Start: 2023-11-16

## 2023-11-16 NOTE — TELEPHONE ENCOUNTER
PCP: Jennifer Diaz MD    Last appt: 1/19/2022   Future Appointments   Date Time Provider [address]  [address]   1/22/2024 11:30 AM Jennifer Diaz MD [facility] BS AMB       Requested Prescriptions     Pending Prescriptions Disp Refills    amLODIPine (NORVASC) 5 MG tablet 90 tablet 0     Sig: Take 1 tablet by mouth daily    levothyroxine (SYNTHROID) 25 MCG tablet 90 tablet 0     Sig: Take 1 tablet by mouth every morning (before breakfast)

## 2023-11-16 NOTE — TELEPHONE ENCOUNTER
PCP: Macey Medellin MD    Last appt: 1/19/2022   No future appointments.     Requested Prescriptions     Pending Prescriptions Disp Refills    metoprolol succinate (TOPROL XL) 25 MG extended release tablet 90 tablet 0     Sig: Take 1 tablet by mouth daily

## 2023-11-16 NOTE — TELEPHONE ENCOUNTER
amLODIPine (NORVASC) 5 MG ta levothyroxine (SYNTHROID) 25 MCG tabletblet  [address], [place]

## 2024-01-22 ENCOUNTER — OFFICE VISIT (OUTPATIENT)
Age: 79
End: 2024-01-22
Payer: MEDICARE

## 2024-01-22 VITALS
TEMPERATURE: 97.2 F | WEIGHT: 193.8 LBS | BODY MASS INDEX: 34.34 KG/M2 | RESPIRATION RATE: 16 BRPM | SYSTOLIC BLOOD PRESSURE: 136 MMHG | OXYGEN SATURATION: 97 % | DIASTOLIC BLOOD PRESSURE: 62 MMHG | HEART RATE: 76 BPM | HEIGHT: 63 IN

## 2024-01-22 DIAGNOSIS — I10 HYPERTENSION, UNSPECIFIED TYPE: ICD-10-CM

## 2024-01-22 DIAGNOSIS — Z87.448 HX OF ACUTE RENAL FAILURE: ICD-10-CM

## 2024-01-22 DIAGNOSIS — E78.5 HYPERLIPIDEMIA, UNSPECIFIED HYPERLIPIDEMIA TYPE: Primary | ICD-10-CM

## 2024-01-22 DIAGNOSIS — Z00.00 MEDICARE ANNUAL WELLNESS VISIT, SUBSEQUENT: ICD-10-CM

## 2024-01-22 DIAGNOSIS — I35.0 AORTIC VALVE STENOSIS, ETIOLOGY OF CARDIAC VALVE DISEASE UNSPECIFIED: ICD-10-CM

## 2024-01-22 DIAGNOSIS — R06.09 DOE (DYSPNEA ON EXERTION): ICD-10-CM

## 2024-01-22 DIAGNOSIS — E03.9 HYPOTHYROIDISM, UNSPECIFIED TYPE: ICD-10-CM

## 2024-01-22 DIAGNOSIS — R00.2 PALPITATIONS: ICD-10-CM

## 2024-01-22 DIAGNOSIS — M19.90 OSTEOARTHRITIS, UNSPECIFIED OSTEOARTHRITIS TYPE, UNSPECIFIED SITE: ICD-10-CM

## 2024-01-22 PROCEDURE — G8417 CALC BMI ABV UP PARAM F/U: HCPCS | Performed by: INTERNAL MEDICINE

## 2024-01-22 PROCEDURE — G0439 PPPS, SUBSEQ VISIT: HCPCS | Performed by: INTERNAL MEDICINE

## 2024-01-22 PROCEDURE — 4004F PT TOBACCO SCREEN RCVD TLK: CPT | Performed by: INTERNAL MEDICINE

## 2024-01-22 PROCEDURE — G8427 DOCREV CUR MEDS BY ELIG CLIN: HCPCS | Performed by: INTERNAL MEDICINE

## 2024-01-22 PROCEDURE — 3078F DIAST BP <80 MM HG: CPT | Performed by: INTERNAL MEDICINE

## 2024-01-22 PROCEDURE — 3075F SYST BP GE 130 - 139MM HG: CPT | Performed by: INTERNAL MEDICINE

## 2024-01-22 PROCEDURE — G8400 PT W/DXA NO RESULTS DOC: HCPCS | Performed by: INTERNAL MEDICINE

## 2024-01-22 PROCEDURE — G8484 FLU IMMUNIZE NO ADMIN: HCPCS | Performed by: INTERNAL MEDICINE

## 2024-01-22 PROCEDURE — 99214 OFFICE O/P EST MOD 30 MIN: CPT | Performed by: INTERNAL MEDICINE

## 2024-01-22 PROCEDURE — 1090F PRES/ABSN URINE INCON ASSESS: CPT | Performed by: INTERNAL MEDICINE

## 2024-01-22 PROCEDURE — 1123F ACP DISCUSS/DSCN MKR DOCD: CPT | Performed by: INTERNAL MEDICINE

## 2024-01-22 RX ORDER — CALCIUM CARBONATE 500 MG/1
1 TABLET, CHEWABLE ORAL DAILY
COMMUNITY

## 2024-01-22 RX ORDER — ASPIRIN 81 MG/1
TABLET ORAL DAILY
COMMUNITY

## 2024-01-22 RX ORDER — DOCUSATE SODIUM 100 MG/1
100 CAPSULE, LIQUID FILLED ORAL 2 TIMES DAILY
COMMUNITY

## 2024-01-22 RX ORDER — FLUTICASONE PROPIONATE 50 MCG
2 SPRAY, SUSPENSION (ML) NASAL PRN
COMMUNITY

## 2024-01-22 RX ORDER — CHOLECALCIFEROL (VITAMIN D3) 1250 MCG
CAPSULE ORAL
COMMUNITY

## 2024-01-22 RX ORDER — VITAMIN E 268 MG
CAPSULE ORAL DAILY
COMMUNITY

## 2024-01-22 SDOH — ECONOMIC STABILITY: FOOD INSECURITY: WITHIN THE PAST 12 MONTHS, YOU WORRIED THAT YOUR FOOD WOULD RUN OUT BEFORE YOU GOT MONEY TO BUY MORE.: NEVER TRUE

## 2024-01-22 SDOH — ECONOMIC STABILITY: HOUSING INSECURITY
IN THE LAST 12 MONTHS, WAS THERE A TIME WHEN YOU DID NOT HAVE A STEADY PLACE TO SLEEP OR SLEPT IN A SHELTER (INCLUDING NOW)?: NO

## 2024-01-22 SDOH — ECONOMIC STABILITY: FOOD INSECURITY: WITHIN THE PAST 12 MONTHS, THE FOOD YOU BOUGHT JUST DIDN'T LAST AND YOU DIDN'T HAVE MONEY TO GET MORE.: NEVER TRUE

## 2024-01-22 SDOH — ECONOMIC STABILITY: INCOME INSECURITY: HOW HARD IS IT FOR YOU TO PAY FOR THE VERY BASICS LIKE FOOD, HOUSING, MEDICAL CARE, AND HEATING?: NOT HARD AT ALL

## 2024-01-22 ASSESSMENT — LIFESTYLE VARIABLES
HOW OFTEN DO YOU HAVE A DRINK CONTAINING ALCOHOL: NEVER
HOW MANY STANDARD DRINKS CONTAINING ALCOHOL DO YOU HAVE ON A TYPICAL DAY: PATIENT DOES NOT DRINK

## 2024-01-22 ASSESSMENT — PATIENT HEALTH QUESTIONNAIRE - PHQ9
4. FEELING TIRED OR HAVING LITTLE ENERGY: 3
SUM OF ALL RESPONSES TO PHQ9 QUESTIONS 1 & 2: 3
SUM OF ALL RESPONSES TO PHQ QUESTIONS 1-9: 10
7. TROUBLE CONCENTRATING ON THINGS, SUCH AS READING THE NEWSPAPER OR WATCHING TELEVISION: 0
8. MOVING OR SPEAKING SO SLOWLY THAT OTHER PEOPLE COULD HAVE NOTICED. OR THE OPPOSITE, BEING SO FIGETY OR RESTLESS THAT YOU HAVE BEEN MOVING AROUND A LOT MORE THAN USUAL: 0
5. POOR APPETITE OR OVEREATING: 0
6. FEELING BAD ABOUT YOURSELF - OR THAT YOU ARE A FAILURE OR HAVE LET YOURSELF OR YOUR FAMILY DOWN: 3
1. LITTLE INTEREST OR PLEASURE IN DOING THINGS: 1
10. IF YOU CHECKED OFF ANY PROBLEMS, HOW DIFFICULT HAVE THESE PROBLEMS MADE IT FOR YOU TO DO YOUR WORK, TAKE CARE OF THINGS AT HOME, OR GET ALONG WITH OTHER PEOPLE: 2
9. THOUGHTS THAT YOU WOULD BE BETTER OFF DEAD, OR OF HURTING YOURSELF: 0
3. TROUBLE FALLING OR STAYING ASLEEP: 1
2. FEELING DOWN, DEPRESSED OR HOPELESS: 2
SUM OF ALL RESPONSES TO PHQ QUESTIONS 1-9: 10

## 2024-01-22 NOTE — PROGRESS NOTES
1. \"Have you been to the ER, urgent care clinic since your last visit?  Hospitalized since your last visit?\" No    2. \"Have you seen or consulted any other health care providers outside of the [facility] since your last visit?\"       Ophthalmologist     3. For patients aged 45-75: Has the patient had a colonoscopy / FIT/ Cologuard? 2020 gabrielle      If the patient is female:    4. For patients aged 40-74: Has the patient had a mammogram within the past 2 years?  No      5. For patients aged 21-65: Has the patient had a pap smear?  No    
Future    Hypertension, unspecified type    Hypothyroidism, unspecified type  -     TSH; Future    Osteoarthritis, unspecified osteoarthritis type, unspecified site    SIMMS (dyspnea on exertion)  -     Roosevelt Irby MD, Cardiology [place]    Palpitations  -     Roosevelt Irby MD, Cardiology, [place]    Aortic valve stenosis, etiology of cardiac valve disease unspecified  -     [facility]          Alexis Driscoll MD Medicare Annual Wellness Visit    Cristiane Centeno is here for Medicare AWV    Assessment & Plan   Hyperlipidemia, unspecified hyperlipidemia type  Hx of acute renal failure  Hypertension, unspecified type  Hypothyroidism, unspecified type  Osteoarthritis, unspecified osteoarthritis type, unspecified site    Recommendations for Preventive Services Due: see orders and patient instructions/AVS.  Recommended screening schedule for the next 5-10 years is provided to the patient in written form: see Patient Instructions/AVS.     Recommended RSV and covid booster  Pt declines mammogram-advised monthly BSE    RTC 1 year wellness  No follow-ups on file.     Subjective       Patient's complete Health Risk Assessment and screening values have been reviewed and are found in Flowsheets. The following problems were reviewed today and where indicated follow up appointments were made and/or referrals ordered.    Positive Risk Factor Screenings with Interventions:    Fall Risk:  Do you feel unsteady or are you worried about falling? : (!) yes  2 or more falls in past year?: no  Fall with injury in past year?: no       Interventions:    Reviewed medications, home hazards, visual acuity, and co-morbidities that can increase risk for falls  Patient declines any further evaluation or treatment     Depression:  PHQ-2 Score: 3  PHQ-9 Total Score: 10    Interpretation:  5-9 mild   10-14 moderate   15-19 moderately severe   20-27 severe     Interventions:  Patient declines

## 2024-01-23 ENCOUNTER — TELEPHONE (OUTPATIENT)
Age: 79
End: 2024-01-23

## 2024-01-23 DIAGNOSIS — D50.9 IRON DEFICIENCY ANEMIA, UNSPECIFIED IRON DEFICIENCY ANEMIA TYPE: Primary | ICD-10-CM

## 2024-01-23 LAB
ALBUMIN SERPL-MCNC: 3.7 G/DL (ref 3.5–5)
ALBUMIN/GLOB SERPL: 1.1 (ref 1.1–2.2)
ALP SERPL-CCNC: 120 U/L (ref 45–117)
ALT SERPL-CCNC: 21 U/L (ref 12–78)
ANION GAP SERPL CALC-SCNC: 9 MMOL/L (ref 5–15)
AST SERPL-CCNC: 16 U/L (ref 15–37)
BILIRUB SERPL-MCNC: 0.4 MG/DL (ref 0.2–1)
BUN SERPL-MCNC: 20 MG/DL (ref 6–20)
BUN/CREAT SERPL: 26 (ref 12–20)
CALCIUM SERPL-MCNC: 9.8 MG/DL (ref 8.5–10.1)
CHLORIDE SERPL-SCNC: 108 MMOL/L (ref 97–108)
CHOLEST SERPL-MCNC: 236 MG/DL
CO2 SERPL-SCNC: 25 MMOL/L (ref 21–32)
CREAT SERPL-MCNC: 0.78 MG/DL (ref 0.55–1.02)
ERYTHROCYTE [DISTWIDTH] IN BLOOD BY AUTOMATED COUNT: 17.3 % (ref 11.5–14.5)
FERRITIN SERPL-MCNC: 10 NG/ML (ref 8–252)
GLOBULIN SER CALC-MCNC: 3.4 G/DL (ref 2–4)
GLUCOSE SERPL-MCNC: 103 MG/DL (ref 65–100)
HCT VFR BLD AUTO: 30.7 % (ref 35–47)
HDLC SERPL-MCNC: 47 MG/DL
HDLC SERPL: 5 (ref 0–5)
HGB BLD-MCNC: 9 G/DL (ref 11.5–16)
IRON SATN MFR SERPL: 4 % (ref 20–50)
IRON SERPL-MCNC: 19 UG/DL (ref 35–150)
LDLC SERPL CALC-MCNC: 142.6 MG/DL (ref 0–100)
MCH RBC QN AUTO: 21.3 PG (ref 26–34)
MCHC RBC AUTO-ENTMCNC: 29.3 G/DL (ref 30–36.5)
MCV RBC AUTO: 72.6 FL (ref 80–99)
NRBC # BLD: 0 K/UL (ref 0–0.01)
NRBC BLD-RTO: 0 PER 100 WBC
PLATELET # BLD AUTO: 435 K/UL (ref 150–400)
PMV BLD AUTO: 9.9 FL (ref 8.9–12.9)
POTASSIUM SERPL-SCNC: 4.4 MMOL/L (ref 3.5–5.1)
PROT SERPL-MCNC: 7.1 G/DL (ref 6.4–8.2)
RBC # BLD AUTO: 4.23 M/UL (ref 3.8–5.2)
SODIUM SERPL-SCNC: 142 MMOL/L (ref 136–145)
TIBC SERPL-MCNC: 440 UG/DL (ref 250–450)
TRIGL SERPL-MCNC: 232 MG/DL
TSH SERPL DL<=0.05 MIU/L-ACNC: 0.9 UIU/ML (ref 0.36–3.74)
VLDLC SERPL CALC-MCNC: 46.4 MG/DL
WBC # BLD AUTO: 9.3 K/UL (ref 3.6–11)

## 2024-01-23 RX ORDER — FERROUS SULFATE 325(65) MG
325 TABLET ORAL 2 TIMES DAILY
Qty: 60 TABLET | Refills: 2 | Status: SHIPPED | OUTPATIENT
Start: 2024-01-23

## 2024-01-23 NOTE — TELEPHONE ENCOUNTER
Scheduled appointment for patient with Dr. Cool's office, Gastroenterology. Patient declined appointment at this time. Appointment has been cancelled.

## 2024-02-13 RX ORDER — AMLODIPINE BESYLATE 5 MG/1
5 TABLET ORAL DAILY
Qty: 90 TABLET | Refills: 3 | Status: SHIPPED | OUTPATIENT
Start: 2024-02-13

## 2024-02-13 RX ORDER — LEVOTHYROXINE SODIUM 0.03 MG/1
25 TABLET ORAL
Qty: 90 TABLET | Refills: 3 | Status: SHIPPED | OUTPATIENT
Start: 2024-02-13

## 2024-02-13 RX ORDER — METOPROLOL SUCCINATE 25 MG/1
25 TABLET, EXTENDED RELEASE ORAL DAILY
Qty: 90 TABLET | Refills: 3 | Status: SHIPPED | OUTPATIENT
Start: 2024-02-13

## 2024-02-13 NOTE — TELEPHONE ENCOUNTER
Received faxed refill request from pharmacy      PCP: Alexis Driscoll MD    Last appt: 1/22/2024  No future appointments.    Requested Prescriptions     Pending Prescriptions Disp Refills    amLODIPine (NORVASC) 5 MG tablet 90 tablet 3     Sig: Take 1 tablet by mouth daily    levothyroxine (SYNTHROID) 25 MCG tablet 90 tablet 3     Sig: Take 1 tablet by mouth every morning (before breakfast)    metoprolol succinate (TOPROL XL) 25 MG extended release tablet 90 tablet 3     Sig: Take 1 tablet by mouth daily

## 2024-04-10 DIAGNOSIS — D50.9 IRON DEFICIENCY ANEMIA, UNSPECIFIED IRON DEFICIENCY ANEMIA TYPE: ICD-10-CM

## 2024-04-10 LAB
ERYTHROCYTE [DISTWIDTH] IN BLOOD BY AUTOMATED COUNT: 23.2 % (ref 11.5–14.5)
HCT VFR BLD AUTO: 45 % (ref 35–47)
HGB BLD-MCNC: 14.5 G/DL (ref 11.5–16)
MCH RBC QN AUTO: 26.8 PG (ref 26–34)
MCHC RBC AUTO-ENTMCNC: 32.2 G/DL (ref 30–36.5)
MCV RBC AUTO: 83 FL (ref 80–99)
NRBC # BLD: 0 K/UL (ref 0–0.01)
NRBC BLD-RTO: 0 PER 100 WBC
PLATELET # BLD AUTO: 306 K/UL (ref 150–400)
PMV BLD AUTO: 10.4 FL (ref 8.9–12.9)
RBC # BLD AUTO: 5.42 M/UL (ref 3.8–5.2)
WBC # BLD AUTO: 8.7 K/UL (ref 3.6–11)

## 2024-04-11 NOTE — RESULT ENCOUNTER NOTE
Attempted to reach patient. Vm not set up. Unable to leave message.     Per Dr. Driscoll  Tell pt her Hb is normal now-can stop oral iron--ask pt isf she went to see GI MD for the anemia

## 2024-05-14 RX ORDER — FUROSEMIDE 20 MG/1
20 TABLET ORAL DAILY PRN
Qty: 90 TABLET | Refills: 1 | Status: SHIPPED | OUTPATIENT
Start: 2024-05-14

## 2024-05-14 NOTE — TELEPHONE ENCOUNTER
Received faxed refill request from pharmacy      PCP: Alexis Driscoll MD    Last appt: 1/22/2024  No future appointments.    Requested Prescriptions     Pending Prescriptions Disp Refills    furosemide (LASIX) 20 MG tablet 90 tablet 0     Sig: Take 1 tablet by mouth daily as needed (Take 1 Tablet by mouth daily as needed for PRN Reason (Other) (Edema). Indications: visible water retention)

## 2024-06-06 LAB
ERYTHROCYTE [DISTWIDTH] IN BLOOD BY AUTOMATED COUNT: 15.7 % (ref 11.5–14.5)
FERRITIN SERPL-MCNC: 29 NG/ML (ref 26–388)
HCT VFR BLD AUTO: 46.5 % (ref 35–47)
HGB BLD-MCNC: 14.9 G/DL (ref 11.5–16)
IRON SATN MFR SERPL: 18 % (ref 20–50)
IRON SERPL-MCNC: 64 UG/DL (ref 35–150)
MCH RBC QN AUTO: 28.5 PG (ref 26–34)
MCHC RBC AUTO-ENTMCNC: 32 G/DL (ref 30–36.5)
MCV RBC AUTO: 88.9 FL (ref 80–99)
NRBC # BLD: 0 K/UL (ref 0–0.01)
NRBC BLD-RTO: 0 PER 100 WBC
PLATELET # BLD AUTO: 263 K/UL (ref 150–400)
PMV BLD AUTO: 9.8 FL (ref 8.9–12.9)
RBC # BLD AUTO: 5.23 M/UL (ref 3.8–5.2)
TIBC SERPL-MCNC: 360 UG/DL (ref 250–450)
WBC # BLD AUTO: 7.9 K/UL (ref 3.6–11)

## 2024-10-22 ENCOUNTER — TELEPHONE (OUTPATIENT)
Age: 79
End: 2024-10-22

## 2024-10-22 NOTE — TELEPHONE ENCOUNTER
----- Message from Kamille RAMIREZ sent at 10/22/2024  2:03 PM EDT -----  Regarding: [facility] Appointment Request  [facility] Appointment Request    Patient needs appointment for [facility] Appointment Type: Annual Visit.    Patient Requested Dates(s): any day after January 22, 2025   Patient Requested Time: 10 am   Provider Name: Alexis Driscoll    Reason for Appointment Request: Established Patient - Available appointments did not meet patient need  --------------------------------------------------------------------------------------------------------------------------    Relationship to Patient: Self     Call Back Information: OK to leave message on voicemail  Preferred Call Back Number: Phone [phone]

## 2024-11-13 RX ORDER — FUROSEMIDE 20 MG/1
20 TABLET ORAL DAILY PRN
Qty: 90 TABLET | Refills: 1 | Status: SHIPPED | OUTPATIENT
Start: 2024-11-13

## 2024-11-13 NOTE — TELEPHONE ENCOUNTER
Received faxed refill request from pharmacy      PCP: Alexis Driscoll MD    Last appt: 1/22/2024  Future Appointments   Date Time Provider Department Center   1/22/2025 11:30 AM Alexis Driscoll MD [facility] DEP       Requested Prescriptions     Pending Prescriptions Disp Refills    furosemide (LASIX) 20 MG tablet 90 tablet 1     Sig: Take 1 tablet by mouth daily as needed (Take 1 Tablet by mouth daily as needed for PRN Reason (Other) (Edema). Indications: visible water retention)

## 2025-01-22 ENCOUNTER — OFFICE VISIT (OUTPATIENT)
Age: 80
End: 2025-01-22
Payer: MEDICARE

## 2025-01-22 VITALS
HEIGHT: 63 IN | BODY MASS INDEX: 34.2 KG/M2 | WEIGHT: 193 LBS | DIASTOLIC BLOOD PRESSURE: 70 MMHG | SYSTOLIC BLOOD PRESSURE: 160 MMHG | HEART RATE: 66 BPM | OXYGEN SATURATION: 96 % | RESPIRATION RATE: 16 BRPM | TEMPERATURE: 97.3 F

## 2025-01-22 DIAGNOSIS — E03.9 HYPOTHYROIDISM, UNSPECIFIED TYPE: ICD-10-CM

## 2025-01-22 DIAGNOSIS — M54.50 CHRONIC LOW BACK PAIN, UNSPECIFIED BACK PAIN LATERALITY, UNSPECIFIED WHETHER SCIATICA PRESENT: ICD-10-CM

## 2025-01-22 DIAGNOSIS — G89.29 CHRONIC LOW BACK PAIN, UNSPECIFIED BACK PAIN LATERALITY, UNSPECIFIED WHETHER SCIATICA PRESENT: ICD-10-CM

## 2025-01-22 DIAGNOSIS — E78.5 HYPERLIPIDEMIA, UNSPECIFIED HYPERLIPIDEMIA TYPE: ICD-10-CM

## 2025-01-22 DIAGNOSIS — Z00.00 MEDICARE ANNUAL WELLNESS VISIT, SUBSEQUENT: ICD-10-CM

## 2025-01-22 DIAGNOSIS — E78.00 PURE HYPERCHOLESTEROLEMIA: Primary | ICD-10-CM

## 2025-01-22 DIAGNOSIS — I10 HYPERTENSION, UNSPECIFIED TYPE: ICD-10-CM

## 2025-01-22 PROCEDURE — 99214 OFFICE O/P EST MOD 30 MIN: CPT | Performed by: INTERNAL MEDICINE

## 2025-01-22 SDOH — ECONOMIC STABILITY: FOOD INSECURITY: WITHIN THE PAST 12 MONTHS, THE FOOD YOU BOUGHT JUST DIDN'T LAST AND YOU DIDN'T HAVE MONEY TO GET MORE.: NEVER TRUE

## 2025-01-22 SDOH — ECONOMIC STABILITY: FOOD INSECURITY: WITHIN THE PAST 12 MONTHS, YOU WORRIED THAT YOUR FOOD WOULD RUN OUT BEFORE YOU GOT MONEY TO BUY MORE.: NEVER TRUE

## 2025-01-22 ASSESSMENT — PATIENT HEALTH QUESTIONNAIRE - PHQ9
SUM OF ALL RESPONSES TO PHQ QUESTIONS 1-9: 9
9. THOUGHTS THAT YOU WOULD BE BETTER OFF DEAD, OR OF HURTING YOURSELF: NOT AT ALL
5. POOR APPETITE OR OVEREATING: NOT AT ALL
SUM OF ALL RESPONSES TO PHQ QUESTIONS 1-9: 9
8. MOVING OR SPEAKING SO SLOWLY THAT OTHER PEOPLE COULD HAVE NOTICED. OR THE OPPOSITE, BEING SO FIGETY OR RESTLESS THAT YOU HAVE BEEN MOVING AROUND A LOT MORE THAN USUAL: NOT AT ALL
10. IF YOU CHECKED OFF ANY PROBLEMS, HOW DIFFICULT HAVE THESE PROBLEMS MADE IT FOR YOU TO DO YOUR WORK, TAKE CARE OF THINGS AT HOME, OR GET ALONG WITH OTHER PEOPLE: NOT DIFFICULT AT ALL
6. FEELING BAD ABOUT YOURSELF - OR THAT YOU ARE A FAILURE OR HAVE LET YOURSELF OR YOUR FAMILY DOWN: NEARLY EVERY DAY
SUM OF ALL RESPONSES TO PHQ QUESTIONS 1-9: 9
2. FEELING DOWN, DEPRESSED OR HOPELESS: MORE THAN HALF THE DAYS
SUM OF ALL RESPONSES TO PHQ QUESTIONS 1-9: 9
7. TROUBLE CONCENTRATING ON THINGS, SUCH AS READING THE NEWSPAPER OR WATCHING TELEVISION: NOT AT ALL
4. FEELING TIRED OR HAVING LITTLE ENERGY: NEARLY EVERY DAY
SUM OF ALL RESPONSES TO PHQ9 QUESTIONS 1 & 2: 3
3. TROUBLE FALLING OR STAYING ASLEEP: NOT AT ALL
1. LITTLE INTEREST OR PLEASURE IN DOING THINGS: SEVERAL DAYS

## 2025-01-22 ASSESSMENT — LIFESTYLE VARIABLES
HOW MANY STANDARD DRINKS CONTAINING ALCOHOL DO YOU HAVE ON A TYPICAL DAY: PATIENT DOES NOT DRINK
HOW OFTEN DO YOU HAVE A DRINK CONTAINING ALCOHOL: NEVER

## 2025-01-22 NOTE — PROGRESS NOTES
\"Have you been to the ER, urgent care clinic since your last visit?  Hospitalized since your last visit?\"    No    “Have you seen or consulted any other health care providers outside our system since your last visit?”    Card  Kidney   Ortho   Urology              
morning (before breakfast) 90 tablet 3    metoprolol succinate (TOPROL XL) 25 MG extended release tablet Take 1 tablet by mouth daily 90 tablet 3    ferrous sulfate (IRON 325) 325 (65 Fe) MG tablet Take 1 tablet by mouth 2 times daily 60 tablet 2    aspirin 81 MG EC tablet Take by mouth daily      docusate sodium (COLACE) 100 MG capsule Take 1 capsule by mouth 2 times daily      NONFORMULARY megred      Multiple Vitamins-Minerals (PRESERVISION AREDS 2 PO) Take by mouth      Cranberry-Vitamin C-Vitamin E 140-100-3 MG-MG-UNIT CAPS Take 500 mg by mouth daily      vitamin E 400 UNIT capsule Take by mouth daily      Multiple Vitamins-Minerals (MULTIVITAMIN GUMMIES ADULT PO) Take by mouth      Cyanocobalamin (VITAMIN B-12 PO) Take by mouth      Cholecalciferol (VITAMIN D3) 1.25 MG (86576 UT) CAPS Take by mouth      Cetirizine HCl (ALLERGY RELIEF CETIRIZINE PO) Take by mouth      NONFORMULARY Hair skin nails multivitamin gummies      calcium carbonate (TUMS) 500 MG chewable tablet Take 1 tablet by mouth daily      fluticasone (FLONASE) 50 MCG/ACT nasal spray 2 sprays by Nasal route as needed      acetaminophen (TYLENOL) 500 MG tablet Take by mouth 3 times daily      Biotin 10 MG CAPS Take 2 tablets by mouth every evening      DULoxetine (CYMBALTA) 30 MG extended release capsule ceived the following from [facility]: Outside name: DULoxetine (CYMBALTA) 30 mg capsule      famotidine (PEPCID) 20 MG tablet Take by mouth daily as needed (Patient not taking: Reported on 1/22/2024)      fexofenadine (ALLEGRA) 180 MG tablet Take by mouth every 30 days      fluticasone-salmeterol (ADVAIR HFA) 230-21 MCG/ACT inhaler Inhale 2 puffs into the lungs 2 times daily as needed      Magnesium Hydroxide 1200 MG CHEW Take by mouth as needed (Patient not taking: Reported on 1/22/2024)      oxybutynin (DITROPAN XL) 15 MG extended release tablet Take by mouth daily       No current facility-administered medications for this visit.

## 2025-01-27 DIAGNOSIS — I10 HYPERTENSION, UNSPECIFIED TYPE: ICD-10-CM

## 2025-01-27 DIAGNOSIS — E78.5 HYPERLIPIDEMIA, UNSPECIFIED HYPERLIPIDEMIA TYPE: ICD-10-CM

## 2025-01-27 DIAGNOSIS — E78.00 PURE HYPERCHOLESTEROLEMIA: ICD-10-CM

## 2025-01-27 DIAGNOSIS — E03.9 HYPOTHYROIDISM, UNSPECIFIED TYPE: ICD-10-CM

## 2025-01-28 LAB
ALBUMIN SERPL-MCNC: 3.6 G/DL (ref 3.5–5)
ALBUMIN/GLOB SERPL: 0.9 (ref 1.1–2.2)
ALP SERPL-CCNC: 124 U/L (ref 45–117)
ALT SERPL-CCNC: 21 U/L (ref 12–78)
ANION GAP SERPL CALC-SCNC: 7 MMOL/L (ref 2–12)
AST SERPL-CCNC: 21 U/L (ref 15–37)
BILIRUB SERPL-MCNC: 0.9 MG/DL (ref 0.2–1)
BUN SERPL-MCNC: 17 MG/DL (ref 6–20)
BUN/CREAT SERPL: 21 (ref 12–20)
CALCIUM SERPL-MCNC: 9.2 MG/DL (ref 8.5–10.1)
CHLORIDE SERPL-SCNC: 105 MMOL/L (ref 97–108)
CHOLEST SERPL-MCNC: 254 MG/DL
CO2 SERPL-SCNC: 25 MMOL/L (ref 21–32)
CREAT SERPL-MCNC: 0.8 MG/DL (ref 0.55–1.02)
ERYTHROCYTE [DISTWIDTH] IN BLOOD BY AUTOMATED COUNT: 13.5 % (ref 11.5–14.5)
GLOBULIN SER CALC-MCNC: 3.9 G/DL (ref 2–4)
GLUCOSE SERPL-MCNC: 97 MG/DL (ref 65–100)
HCT VFR BLD AUTO: 48 % (ref 35–47)
HDLC SERPL-MCNC: 52 MG/DL
HDLC SERPL: 4.9 (ref 0–5)
HGB BLD-MCNC: 15.4 G/DL (ref 11.5–16)
LDLC SERPL CALC-MCNC: 173.4 MG/DL (ref 0–100)
MCH RBC QN AUTO: 29.8 PG (ref 26–34)
MCHC RBC AUTO-ENTMCNC: 32.1 G/DL (ref 30–36.5)
MCV RBC AUTO: 93 FL (ref 80–99)
NRBC # BLD: 0 K/UL (ref 0–0.01)
NRBC BLD-RTO: 0 PER 100 WBC
PLATELET # BLD AUTO: 255 K/UL (ref 150–400)
PMV BLD AUTO: 10.7 FL (ref 8.9–12.9)
POTASSIUM SERPL-SCNC: 4.3 MMOL/L (ref 3.5–5.1)
PROT SERPL-MCNC: 7.5 G/DL (ref 6.4–8.2)
RBC # BLD AUTO: 5.16 M/UL (ref 3.8–5.2)
SODIUM SERPL-SCNC: 137 MMOL/L (ref 136–145)
TRIGL SERPL-MCNC: 143 MG/DL
TSH SERPL DL<=0.05 MIU/L-ACNC: 1.6 UIU/ML (ref 0.36–3.74)
VLDLC SERPL CALC-MCNC: 28.6 MG/DL
WBC # BLD AUTO: 7.4 K/UL (ref 3.6–11)

## 2025-01-28 RX ORDER — EZETIMIBE 10 MG/1
10 TABLET ORAL DAILY
Qty: 90 TABLET | Refills: 1 | Status: SHIPPED | OUTPATIENT
Start: 2025-01-28

## 2025-01-29 ENCOUNTER — CLINICAL DOCUMENTATION (OUTPATIENT)
Age: 80
End: 2025-01-29

## 2025-01-29 NOTE — PROGRESS NOTES
Patient was discussed in valve conference on 1/29/2025 with Dr. Riddle, Dr. Gutierrez, Dr. Duong, and Dr. Yañez. Plan for TAVR #23 RTF with Dr. Riddle and Dr. Duong on 2/12/25 Pending in-person cardiac surgery consultation and evaluation.

## 2025-02-06 ENCOUNTER — TELEPHONE (OUTPATIENT)
Age: 80
End: 2025-02-06

## 2025-02-06 NOTE — TELEPHONE ENCOUNTER
Patient called to reschedule upcoming appointment 2/12/25 due to weather concerns.    She has been rescheduled to 2/24/25 at 1:15pm

## 2025-02-12 RX ORDER — LEVOTHYROXINE SODIUM 25 UG/1
25 TABLET ORAL
Qty: 90 TABLET | Refills: 3 | Status: SHIPPED | OUTPATIENT
Start: 2025-02-12

## 2025-02-12 NOTE — TELEPHONE ENCOUNTER
Requested Prescriptions     Pending Prescriptions Disp Refills    levothyroxine (SYNTHROID) 25 MCG tablet 90 tablet 3     Sig: Take 1 tablet by mouth every morning (before breakfast)        LOV: 01/22/25    Next OV: 01/22/26

## 2025-02-17 RX ORDER — AMLODIPINE BESYLATE 5 MG/1
5 TABLET ORAL DAILY
Qty: 90 TABLET | Refills: 3 | Status: SHIPPED | OUTPATIENT
Start: 2025-02-17

## 2025-02-17 RX ORDER — METOPROLOL SUCCINATE 25 MG/1
25 TABLET, EXTENDED RELEASE ORAL DAILY
Qty: 90 TABLET | Refills: 3 | Status: SHIPPED | OUTPATIENT
Start: 2025-02-17

## 2025-02-17 NOTE — TELEPHONE ENCOUNTER
Received faxed refill request from pharmacy      PCP: Alexis Driscoll MD    Last appt: 1/22/2025  Future Appointments   Date Time Provider Department Center   2/24/2025  1:15 PM Scott Duong MD [facility] BS [facility]   1/22/2026 11:00 AM Alexis Driscoll MD [facility] DEP       Requested Prescriptions     Pending Prescriptions Disp Refills    amLODIPine (NORVASC) 5 MG tablet 90 tablet 3     Sig: Take 1 tablet by mouth daily    metoprolol succinate (TOPROL XL) 25 MG extended release tablet 90 tablet 3     Sig: Take 1 tablet by mouth daily

## 2025-02-17 NOTE — TELEPHONE ENCOUNTER
Received faxed refill request from pharmacy      PCP: Alexis Driscoll MD    Last appt: 1/22/2025  Future Appointments   Date Time Provider Department Center   2/24/2025  1:15 PM Scott Duong MD [facility] BS [facility]   1/22/2026 11:00 AM Alexis Driscoll MD [facility] DEP       Requested Prescriptions     Pending Prescriptions Disp Refills    amLODIPine (NORVASC) 5 MG tablet 90 tablet 3     Sig: Take 1 tablet by mouth daily

## 2025-02-24 ENCOUNTER — OFFICE VISIT (OUTPATIENT)
Age: 80
End: 2025-02-24
Payer: MEDICARE

## 2025-02-24 VITALS
TEMPERATURE: 98 F | DIASTOLIC BLOOD PRESSURE: 71 MMHG | HEART RATE: 64 BPM | BODY MASS INDEX: 33.41 KG/M2 | WEIGHT: 188.6 LBS | OXYGEN SATURATION: 98 % | SYSTOLIC BLOOD PRESSURE: 168 MMHG

## 2025-02-24 DIAGNOSIS — I35.0 AORTIC STENOSIS, SEVERE: Primary | ICD-10-CM

## 2025-02-24 PROCEDURE — 1090F PRES/ABSN URINE INCON ASSESS: CPT

## 2025-02-24 PROCEDURE — 3077F SYST BP >= 140 MM HG: CPT

## 2025-02-24 PROCEDURE — 1123F ACP DISCUSS/DSCN MKR DOCD: CPT

## 2025-02-24 PROCEDURE — G8428 CUR MEDS NOT DOCUMENT: HCPCS

## 2025-02-24 PROCEDURE — 3078F DIAST BP <80 MM HG: CPT

## 2025-02-24 PROCEDURE — 99205 OFFICE O/P NEW HI 60 MIN: CPT

## 2025-02-24 PROCEDURE — G8400 PT W/DXA NO RESULTS DOC: HCPCS

## 2025-02-24 PROCEDURE — 4004F PT TOBACCO SCREEN RCVD TLK: CPT

## 2025-02-24 PROCEDURE — G8417 CALC BMI ABV UP PARAM F/U: HCPCS

## 2025-02-24 RX ORDER — VIBEGRON 75 MG/1
75 TABLET, FILM COATED ORAL DAILY
COMMUNITY

## 2025-02-24 NOTE — PROGRESS NOTES
Patient: Cristiane Centeno   Age: 79 y.o.     Patient Care Team:  Alexis Driscoll MD as PCP - General  Alexis Driscoll MD as PCP - Empaneled Provider  Latoya Garrido MD as Physician  Yarelis Ayala APRN - ALISHA as Nurse Practitioner  Manuel Riddle MD as Consulting Physician (Cardiothoracic Surgery)  Scott Duong MD (Cardiothoracic Surgery)    PCP: Alexis Driscoll MD    Cardiologist: GIDEON    Diagnosis/Reason for Consultation: The encounter diagnosis was Aortic stenosis, severe.     HPI: 79 y.o. female with PMHx of HLD, CKD, hypothyroidism that is [facility] by Dr. Riddle for interventional evaluation of severe AS.    Endorses CP, SOB, dizziness, fatigue, and palpitations. Has been tired for years but feels that it's been building up recently. Has been having intermittent dizziness x 1 year; getting worse overtime- better with \"going slow\". Palpitations for a few years. Intermittent chest pressure over the past year or so without any particular aggravating or alleviating factors. Is SOB several times a day, worse with exertion and has been having issues with LE edema since at least 2020. Denies PND, orthopnea, syncope, and claudication.    Past medical/surgical history positive for hx of CVA/TIA (TIA in 1994), difficulty swallowing pills or food (hx strictures s/p dilation x 2), thyroid disease (hypo), dysrhythmias (a flutter), prior PNA (years ago), frequent urinary tract infections (most recent in February), and testosterone or other hormonal use (was previously on estrogen but not since she turned 72). PM/SH negative for ELEANOR , lung disease, diabetes, kidney disease, liver disease, PAD/PVD, vein stripping, issues with bleeding or blood clots, cancer within the past 5 years, hospitalizations for HF within the past year, previous valve replacements or PPM, and is not considered immunocompromised.    Denies tobacco use, alcohol use, and drug use. Family history positive

## 2025-02-24 NOTE — PROGRESS NOTES
Name: Cristiane Centeno   : 1945   Home Phone: [phone]     Reviewed record in preparation for visit and have obtained necessary documentation. Identified pt with two pt identifiers (name and ).     1. Have you been to the ER, urgent care clinic since your last visit?  Hospitalized since your last visit?No    2. Have you seen or consulted any other health care providers outside of the [facility] since your last visit?  Include any pap smears or colon screening. No      Cristiane Cneteno is being seen for TAVR consult .     Patient counseled on Office contact information and instructed to follow up  with primary cardiologist. Patient given opportunity to ask questions and receive clarification.     Current Medications-Reviewed with Patient    Allergies-Reviewed with Patient    Five meter walks  Walk 1: 8.23 Seconds  Walk 2: 8.57 Seconds  Walk 3: 8 Seconds  Total: 24.8 Seconds  Average: 8.27         Vitals  Wt 85.5 kg (188 lb 9.6 oz)   BMI 33.41 kg/m²

## 2025-02-25 ENCOUNTER — TELEPHONE (OUTPATIENT)
Age: 80
End: 2025-02-25

## 2025-02-25 NOTE — TELEPHONE ENCOUNTER
I called patient to go over PAT's/Surgery information.    She informed me that she needs to get shots in her back with Dr. [facility] at [facility].  I asked her if she discussed this with Ramona BRITO yesterday.  She states she did not.    Please return patient's call at [phone] to discuss if it's okay to get the shots before surgery.

## 2025-02-27 NOTE — TELEPHONE ENCOUNTER
PCP: Niurka Catherine MD    Last appt: 6/11/2018  Future Appointments   Date Time Provider Lois Cali   11/12/2018 10:00 AM Niurka Catherine MD [facility] 87       Requested Prescriptions     Pending Prescriptions Disp Refills    levothyroxine (SYNTHROID) 50 mcg tablet 90 Tab 3     Sig: Take 1 Tab by mouth Daily (before breakfast). Home

## 2025-02-28 ENCOUNTER — TELEPHONE (OUTPATIENT)
Age: 80
End: 2025-02-28

## 2025-02-28 NOTE — TELEPHONE ENCOUNTER
Patient calling to ask if she is okay to take prescribed Myrbetriq prior to TAVR procedure. Per NP Fide this should not have effect on patient. RN reminded patient that if she is concerned with HTN side effect of medication to continue to monitor BP and report to Dr. Riddle if she notices a persistent increase.

## 2025-03-06 ENCOUNTER — PREP FOR PROCEDURE (OUTPATIENT)
Age: 80
End: 2025-03-06

## 2025-03-06 RX ORDER — SODIUM CHLORIDE 0.9 % (FLUSH) 0.9 %
5-40 SYRINGE (ML) INJECTION PRN
Status: CANCELLED | OUTPATIENT
Start: 2025-03-06

## 2025-03-06 RX ORDER — SODIUM CHLORIDE 9 MG/ML
INJECTION, SOLUTION INTRAVENOUS PRN
Status: CANCELLED | OUTPATIENT
Start: 2025-03-06

## 2025-03-06 RX ORDER — SODIUM CHLORIDE 0.9 % (FLUSH) 0.9 %
5-40 SYRINGE (ML) INJECTION EVERY 12 HOURS SCHEDULED
Status: CANCELLED | OUTPATIENT
Start: 2025-03-06

## 2025-03-06 NOTE — CONSENT
Informed Consent for Blood Component Transfusion Note    I have discussed with the patient the rationale for blood component transfusion; its benefits in treating or preventing fatigue, organ damage, or death; and its risk which includes mild transfusion reactions, rare risk of blood borne infection, or more serious but rare reactions. I have discussed the alternatives to transfusion, including the risk and consequences of not receiving transfusion. The patient had an opportunity to ask questions and had agreed to proceed with transfusion of blood components.    Electronically signed by JOSSELIN Siddiqui NP on 3/19/25 at 2:21 PM EDT

## 2025-03-06 NOTE — H&P
*The following was copied and pasted from BIBI dated 2/24/25 with any changes noted below:     Patient: Cristiane Centeno   Age: 79 y.o.     Patient Care Team:  Alexis Driscoll MD as PCP - General  Alexis Driscoll MD as PCP - Empaneled Provider  Latoya Garrido MD as Physician  Yarelis Ayala APRN - NP as Nurse Practitioner  Manuel Riddle MD as Consulting Physician (Cardiothoracic Surgery)  Scott Duong MD (Cardiothoracic Surgery)    PCP: Alexis Driscoll MD    Cardiologist: GIDEON    Diagnosis/Reason for Consultation: The encounter diagnosis was Aortic stenosis.     HPI: 79 y.o. female with PMHx of HLD, CKD, hypothyroidism that is [facility] by Dr. Riddle for interventional evaluation of severe AS.    Endorses CP, SOB, dizziness, fatigue, and palpitations. Has been tired for years but feels that it's been building up recently. Has been having intermittent dizziness x 1 year; getting worse overtime- better with \"going slow\". Palpitations for a few years. Intermittent chest pressure over the past year or so without any particular aggravating or alleviating factors. Is SOB several times a day, worse with exertion and has been having issues with LE edema since at least 2020. Denies PND, orthopnea, syncope, and claudication.    Past medical/surgical history positive for hx of CVA/TIA (TIA in 1994), difficulty swallowing pills or food (hx strictures s/p dilation x 2), thyroid disease (hypo), dysrhythmias (a flutter), prior PNA (years ago), frequent urinary tract infections (most recent in February), and testosterone or other hormonal use (was previously on estrogen but not since she turned 72). PM/SH negative for ELEANOR , lung disease, diabetes, kidney disease, liver disease, PAD/PVD, vein stripping, issues with bleeding or blood clots, cancer within the past 5 years, hospitalizations for HF within the past year, previous valve replacements or PPM, and is not considered

## 2025-03-06 NOTE — PROGRESS NOTES
Please obtain the following for PAT:  CBC with diff  CMP  Mag  PT/INR  BNP  A1C  TSH  Type and cross: Prepare 1 and keep 1 units ahead  UA with reflex to culture *only if symptomatic*  12 lead EKG  PA and lateral CXR      Medication instructions:     Stop Last dose   Toprol  3/24/25 AM   Lasix 3/25/25 AM   Vitamins and supplements ASAP     Take only ASA the AM of procedure.     Thank you!

## 2025-03-19 ENCOUNTER — HOSPITAL ENCOUNTER (OUTPATIENT)
Facility: HOSPITAL | Age: 80
Discharge: HOME | End: 2025-03-22
Payer: MEDICARE

## 2025-03-19 VITALS
OXYGEN SATURATION: 98 % | HEIGHT: 61 IN | SYSTOLIC BLOOD PRESSURE: 151 MMHG | TEMPERATURE: 98.6 F | WEIGHT: 190.48 LBS | DIASTOLIC BLOOD PRESSURE: 46 MMHG | HEART RATE: 70 BPM | RESPIRATION RATE: 14 BRPM | BODY MASS INDEX: 35.96 KG/M2

## 2025-03-19 LAB
ABO + RH BLD: NORMAL
ALBUMIN SERPL-MCNC: 3.5 G/DL (ref 3.5–5)
ALBUMIN/GLOB SERPL: 0.9 (ref 1.1–2.2)
ALP SERPL-CCNC: 104 U/L (ref 45–117)
ALT SERPL-CCNC: 25 U/L (ref 12–78)
ANION GAP SERPL CALC-SCNC: 3 MMOL/L (ref 2–12)
APPEARANCE UR: CLEAR
AST SERPL-CCNC: 21 U/L (ref 15–37)
BACTERIA URNS QL MICRO: ABNORMAL /HPF
BASOPHILS # BLD: 0.07 K/UL (ref 0–0.1)
BASOPHILS NFR BLD: 0.9 % (ref 0–1)
BILIRUB SERPL-MCNC: 0.6 MG/DL (ref 0.2–1)
BILIRUB UR QL: NEGATIVE
BLOOD GROUP ANTIBODIES SERPL: NORMAL
BUN SERPL-MCNC: 20 MG/DL (ref 6–20)
BUN/CREAT SERPL: 26 (ref 12–20)
CALCIUM SERPL-MCNC: 9.2 MG/DL (ref 8.5–10.1)
CHLORIDE SERPL-SCNC: 107 MMOL/L (ref 97–108)
CO2 SERPL-SCNC: 28 MMOL/L (ref 21–32)
COLOR UR: ABNORMAL
CREAT SERPL-MCNC: 0.78 MG/DL (ref 0.55–1.02)
DIFFERENTIAL METHOD BLD: NORMAL
EKG ATRIAL RATE: 70 BPM
EKG DIAGNOSIS: NORMAL
EKG P AXIS: 57 DEGREES
EKG P-R INTERVAL: 152 MS
EKG Q-T INTERVAL: 438 MS
EKG QRS DURATION: 142 MS
EKG QTC CALCULATION (BAZETT): 473 MS
EKG R AXIS: -51 DEGREES
EKG T AXIS: 107 DEGREES
EKG VENTRICULAR RATE: 70 BPM
EOSINOPHIL # BLD: 0.31 K/UL (ref 0–0.4)
EOSINOPHIL NFR BLD: 3.9 % (ref 0–7)
EPITH CASTS URNS QL MICRO: ABNORMAL /LPF
ERYTHROCYTE [DISTWIDTH] IN BLOOD BY AUTOMATED COUNT: 13.8 % (ref 11.5–14.5)
EST. AVERAGE GLUCOSE BLD GHB EST-MCNC: 111 MG/DL
GLOBULIN SER CALC-MCNC: 4 G/DL (ref 2–4)
GLUCOSE SERPL-MCNC: 95 MG/DL (ref 65–100)
GLUCOSE UR STRIP.AUTO-MCNC: NEGATIVE MG/DL
HBA1C MFR BLD: 5.5 % (ref 4–5.6)
HCT VFR BLD AUTO: 46.6 % (ref 35–47)
HGB BLD-MCNC: 15.1 G/DL (ref 11.5–16)
HGB UR QL STRIP: ABNORMAL
HISTORY CHECK: NORMAL
HYALINE CASTS URNS QL MICRO: ABNORMAL /LPF (ref 0–2)
IMM GRANULOCYTES # BLD AUTO: 0.03 K/UL (ref 0–0.04)
IMM GRANULOCYTES NFR BLD AUTO: 0.4 % (ref 0–0.5)
INR PPP: 1 (ref 0.9–1.1)
KETONES UR QL STRIP.AUTO: NEGATIVE MG/DL
LEUKOCYTE ESTERASE UR QL STRIP.AUTO: ABNORMAL
LYMPHOCYTES # BLD: 1.55 K/UL (ref 0.8–3.5)
LYMPHOCYTES NFR BLD: 19.7 % (ref 12–49)
MAGNESIUM SERPL-MCNC: 2.2 MG/DL (ref 1.6–2.4)
MCH RBC QN AUTO: 30.2 PG (ref 26–34)
MCHC RBC AUTO-ENTMCNC: 32.4 G/DL (ref 30–36.5)
MCV RBC AUTO: 93.2 FL (ref 80–99)
MONOCYTES # BLD: 0.76 K/UL (ref 0–1)
MONOCYTES NFR BLD: 9.6 % (ref 5–13)
NEUTS SEG # BLD: 5.16 K/UL (ref 1.8–8)
NEUTS SEG NFR BLD: 65.5 % (ref 32–75)
NITRITE UR QL STRIP.AUTO: NEGATIVE
NRBC # BLD: 0 K/UL (ref 0–0.01)
NRBC BLD-RTO: 0 PER 100 WBC
NT PRO BNP: 502 PG/ML
PH UR STRIP: 6.5 (ref 5–8)
PLATELET # BLD AUTO: 265 K/UL (ref 150–400)
PMV BLD AUTO: 9.9 FL (ref 8.9–12.9)
POTASSIUM SERPL-SCNC: 3.8 MMOL/L (ref 3.5–5.1)
PROT SERPL-MCNC: 7.5 G/DL (ref 6.4–8.2)
PROT UR STRIP-MCNC: NEGATIVE MG/DL
PROTHROMBIN TIME: 10.4 SEC (ref 9.2–11.2)
RBC # BLD AUTO: 5 M/UL (ref 3.8–5.2)
RBC #/AREA URNS HPF: ABNORMAL /HPF (ref 0–5)
SARS-COV-2 RNA RESP QL NAA+PROBE: NOT DETECTED
SODIUM SERPL-SCNC: 138 MMOL/L (ref 136–145)
SOURCE: NORMAL
SP GR UR REFRACTOMETRY: 1.01
SPECIMEN EXP DATE BLD: NORMAL
TSH SERPL DL<=0.05 MIU/L-ACNC: 0.98 UIU/ML (ref 0.36–3.74)
URINE CULTURE IF INDICATED: ABNORMAL
UROBILINOGEN UR QL STRIP.AUTO: 0.2 EU/DL (ref 0.2–1)
WBC # BLD AUTO: 7.9 K/UL (ref 3.6–11)
WBC URNS QL MICRO: ABNORMAL /HPF (ref 0–4)

## 2025-03-19 PROCEDURE — 81001 URINALYSIS AUTO W/SCOPE: CPT

## 2025-03-19 PROCEDURE — 80053 COMPREHEN METABOLIC PANEL: CPT

## 2025-03-19 PROCEDURE — 85610 PROTHROMBIN TIME: CPT

## 2025-03-19 PROCEDURE — 87086 URINE CULTURE/COLONY COUNT: CPT

## 2025-03-19 PROCEDURE — 86901 BLOOD TYPING SEROLOGIC RH(D): CPT

## 2025-03-19 PROCEDURE — 83735 ASSAY OF MAGNESIUM: CPT

## 2025-03-19 PROCEDURE — 83880 ASSAY OF NATRIURETIC PEPTIDE: CPT

## 2025-03-19 PROCEDURE — 71046 X-RAY EXAM CHEST 2 VIEWS: CPT

## 2025-03-19 PROCEDURE — 86850 RBC ANTIBODY SCREEN: CPT

## 2025-03-19 PROCEDURE — 87186 SC STD MICRODIL/AGAR DIL: CPT

## 2025-03-19 PROCEDURE — 84443 ASSAY THYROID STIM HORMONE: CPT

## 2025-03-19 PROCEDURE — 93005 ELECTROCARDIOGRAM TRACING: CPT

## 2025-03-19 PROCEDURE — 87635 SARS-COV-2 COVID-19 AMP PRB: CPT

## 2025-03-19 PROCEDURE — 83036 HEMOGLOBIN GLYCOSYLATED A1C: CPT

## 2025-03-19 PROCEDURE — 86900 BLOOD TYPING SEROLOGIC ABO: CPT

## 2025-03-19 PROCEDURE — 85025 COMPLETE CBC W/AUTO DIFF WBC: CPT

## 2025-03-19 PROCEDURE — 36415 COLL VENOUS BLD VENIPUNCTURE: CPT

## 2025-03-19 PROCEDURE — 87088 URINE BACTERIA CULTURE: CPT

## 2025-03-19 RX ORDER — FLUTICASONE PROPIONATE AND SALMETEROL XINAFOATE 45; 21 UG/1; UG/1
2 AEROSOL, METERED RESPIRATORY (INHALATION) 2 TIMES DAILY PRN
COMMUNITY
End: 2025-03-19

## 2025-03-19 RX ORDER — MIRABEGRON 50 MG/1
50 TABLET, FILM COATED, EXTENDED RELEASE ORAL DAILY
COMMUNITY

## 2025-03-20 ENCOUNTER — CLINICAL DOCUMENTATION (OUTPATIENT)
Age: 80
End: 2025-03-20

## 2025-03-20 RX ORDER — GRANULES FOR ORAL 3 G/1
3 POWDER ORAL ONCE
Qty: 1 EACH | Refills: 0 | Status: SHIPPED | OUTPATIENT
Start: 2025-03-20 | End: 2025-03-20

## 2025-03-20 NOTE — PROGRESS NOTES
UA shows signs of UTI. Took Macrobid in early Feburary. Discussed with [facility] and Dr. Riddle. Sending Fosfomycin. Called and updated patient. Will follow cultures and follow up with patient next week but keeping patient posted for now.

## 2025-03-20 NOTE — PERIOP NOTE
Incentive Spirometer        Using the incentive spirometer helps expand the small air sacs of your lungs, helps you breathe deeply, and helps improve your lung function.  Use your incentive spirometer twice a day (10 breaths each time) prior to surgery.      How to Use Your Incentive Spirometer:  Hold the incentive spirometer in an upright position.   Breathe out as usual.   Place the mouthpiece in your mouth and seal your lips tightly around it.   Take a deep breath.  Breathe in slowly and as deeply as possible. Keep the blue flow rate guide between the arrows.   Hold your breath as long as possible. Then exhale slowly and allow the piston to fall to the bottom of the column.   Rest for a few seconds and repeat steps one through five at least 10 times.     PAT Tidal Volume: 2250  X: 1  Date: 3/19/25        BRING THE INCENTIVE SPIROMETER WITH YOU TO THE HOSPITAL ON THE DAY OF YOUR SURGERY.  Opportunity given to ask and answer questions as well as to observe return demonstration.    Patient signature_____________________________    Witness____________________________    
[facility]  Preoperative Instructions        Surgery Date 3/26/25              On the day of your surgery, please report to the main entrance of the hospital ([facility] side). Go up to the gold elevators and report to the 2nd floor registration. This is where you go for the cath lab.     2. Do not have anything to eat or drink (including water, gum, mints, coffee, juice) after midnight Tuesday March 25th.?This may not apply to medications prescribed by your physician. ?(Please note below the special instructions with medications to take the morning of your procedure.)    4. We recommend you do not drink any alcoholic beverages 1 week prior to your surgery. Also, no smoking 1 week prior to surgery.    5. Your surgeon would like vitamins/supplements stopped 1 week prior to procedure.    6. Wear comfortable clothes.  Wear glasses instead of contacts.  Do not bring any money or jewelry. Please bring picture ID, insurance card, and any prearranged co-payment or hospital payment.  Do not wear make-up, particularly mascara the morning of your surgery.  Do not wear nail polish, particularly if you are having foot /hand surgery.  Wear your hair loose or down, no ponytails, buns, italia pins or clips.  All body piercings must be removed.  Please shower with antibacterial soap for three consecutive days before and on the morning of surgery, but do not apply any lotions, powders or deodorants after the shower on the day of surgery. Please use a fresh towels after each shower. Please sleep in clean clothes and change bed linens the night before surgery.  Please do not shave for 48 hours prior to surgery. Shaving of the face is acceptable    7. You should understand that if you do not follow these instructions your surgery may be cancelled.  If your physical condition changes (I.e. fever, cold or flu) please contact your surgeon as soon as possible.    8. It is important that you be on time.  If a situation 
Spoke with Ellie @ ext. [phone] who stated abnormal EKG was reviewed and prelim urine cx has been treated.  
healing and prevent you from healing completely.    If you lose your Hibiclens/chlorhexidine, please call the Surgery Center, or it is available for purchase at your pharmacy.               ___________________      ___________________      ________________  (Signature of Patient)          (Witness)                   (Date and Time)

## 2025-03-21 LAB
BACTERIA SPEC CULT: ABNORMAL
CC UR VC: ABNORMAL
SERVICE CMNT-IMP: ABNORMAL

## 2025-03-24 ENCOUNTER — CLINICAL DOCUMENTATION (OUTPATIENT)
Age: 80
End: 2025-03-24

## 2025-03-24 NOTE — PROGRESS NOTES
Called and spoke with Ms. Taryn. She picked up her fosfomycin and feels that her symptoms are improving. Culture is growing out pan-sensitive E. Coli. Confirmed adequate coverage with fosfomycin with [facility]. Still has a little external irritation r/t pad use. Discussed with Dr. Riddle- proceeding with TAVR.

## 2025-03-25 ENCOUNTER — ANESTHESIA EVENT (OUTPATIENT)
Facility: HOSPITAL | Age: 80
DRG: 267 | End: 2025-03-25
Payer: MEDICARE

## 2025-03-26 ENCOUNTER — ANESTHESIA (OUTPATIENT)
Facility: HOSPITAL | Age: 80
DRG: 267 | End: 2025-03-26
Payer: MEDICARE

## 2025-03-26 ENCOUNTER — APPOINTMENT (OUTPATIENT)
Facility: HOSPITAL | Age: 80
DRG: 267 | End: 2025-03-26
Attending: INTERNAL MEDICINE
Payer: MEDICARE

## 2025-03-26 ENCOUNTER — HOSPITAL ENCOUNTER (INPATIENT)
Facility: HOSPITAL | Age: 80
LOS: 1 days | Discharge: HOME | DRG: 267 | End: 2025-03-27
Attending: INTERNAL MEDICINE | Admitting: INTERNAL MEDICINE
Payer: MEDICARE

## 2025-03-26 DIAGNOSIS — I35.0 NONRHEUMATIC AORTIC VALVE STENOSIS: ICD-10-CM

## 2025-03-26 DIAGNOSIS — Z95.2 S/P TAVR (TRANSCATHETER AORTIC VALVE REPLACEMENT): Primary | ICD-10-CM

## 2025-03-26 DIAGNOSIS — I35.0 AORTIC STENOSIS: ICD-10-CM

## 2025-03-26 DIAGNOSIS — Z98.890 PERSONAL HISTORY OF SURGERY TO HEART AND GREAT VESSELS, PRESENTING HAZARDS TO HEALTH: Primary | ICD-10-CM

## 2025-03-26 LAB
ABO + RH BLD: NORMAL
ACT BLD: 124 SECS (ref 79–138)
ACT BLD: 262 SECS (ref 79–138)
ACT BLD: 285 SECS (ref 79–138)
BLD PROD TYP BPU: NORMAL
BLOOD BANK DISPENSE STATUS: NORMAL
BLOOD GROUP ANTIBODIES SERPL: NORMAL
BPU ID: NORMAL
CROSSMATCH RESULT: NORMAL
ECHO AO ROOT DIAM: 2 CM
ECHO AO ROOT INDEX: 1.09 CM/M2
ECHO AV MEAN GRADIENT: 10 MMHG
ECHO AV MEAN VELOCITY: 1.5 M/S
ECHO AV PEAK GRADIENT: 17 MMHG
ECHO AV PEAK VELOCITY: 2 M/S
ECHO AV VELOCITY RATIO: 0.55
ECHO AV VTI: 38.5 CM
ECHO BSA: 1.92 M2
ECHO EST RA PRESSURE: 10 MMHG
ECHO LA DIAMETER INDEX: 2.61 CM/M2
ECHO LA DIAMETER: 4.8 CM
ECHO LA TO AORTIC ROOT RATIO: 2.4
ECHO LV E' LATERAL VELOCITY: 5.59 CM/S
ECHO LV E' SEPTAL VELOCITY: 4.36 CM/S
ECHO LV EF PHYSICIAN: 55 %
ECHO LV EF PHYSICIAN: 55 %
ECHO LV FRACTIONAL SHORTENING: 45 % (ref 28–44)
ECHO LV INTERNAL DIMENSION DIASTOLE INDEX: 2.55 CM/M2
ECHO LV INTERNAL DIMENSION DIASTOLIC: 4.7 CM (ref 3.9–5.3)
ECHO LV INTERNAL DIMENSION SYSTOLIC INDEX: 1.41 CM/M2
ECHO LV INTERNAL DIMENSION SYSTOLIC: 2.6 CM
ECHO LV IVSD: 1.1 CM (ref 0.6–0.9)
ECHO LV MASS 2D: 153.4 G (ref 67–162)
ECHO LV MASS INDEX 2D: 83.4 G/M2 (ref 43–95)
ECHO LV POSTERIOR WALL DIASTOLIC: 0.8 CM (ref 0.6–0.9)
ECHO LV RELATIVE WALL THICKNESS RATIO: 0.34
ECHO LVOT AV VTI INDEX: 0.71
ECHO LVOT MEAN GRADIENT: 3 MMHG
ECHO LVOT PEAK GRADIENT: 5 MMHG
ECHO LVOT PEAK VELOCITY: 1.1 M/S
ECHO LVOT VTI: 27.2 CM
ECHO MV A VELOCITY: 1.54 M/S
ECHO MV E DECELERATION TIME (DT): 642.4 MS
ECHO MV E VELOCITY: 1.01 M/S
ECHO MV E/A RATIO: 0.66
ECHO MV E/E' LATERAL: 18.07
ECHO MV E/E' RATIO (AVERAGED): 20.62
ECHO MV E/E' SEPTAL: 23.17
ECHO RIGHT VENTRICULAR SYSTOLIC PRESSURE (RVSP): 37 MMHG
ECHO RV TAPSE: 2.6 CM (ref 1.7–?)
ECHO TV REGURGITANT MAX VELOCITY: 2.62 M/S
ECHO TV REGURGITANT PEAK GRADIENT: 28 MMHG
HCT VFR BLD AUTO: 41.5 % (ref 35–47)
HGB BLD-MCNC: 13.8 G/DL (ref 11.5–16)
SPECIMEN EXP DATE BLD: NORMAL
UNIT DIVISION: 0

## 2025-03-26 PROCEDURE — 36415 COLL VENOUS BLD VENIPUNCTURE: CPT

## 2025-03-26 PROCEDURE — 2500000003 HC RX 250 WO HCPCS

## 2025-03-26 PROCEDURE — 2580000003 HC RX 258

## 2025-03-26 PROCEDURE — 3700000000 HC ANESTHESIA ATTENDED CARE: Performed by: INTERNAL MEDICINE

## 2025-03-26 PROCEDURE — G0278 ILIAC ART ANGIO,CARDIAC CATH: HCPCS | Performed by: INTERNAL MEDICINE

## 2025-03-26 PROCEDURE — 93308 TTE F-UP OR LMTD: CPT

## 2025-03-26 PROCEDURE — 6360000002 HC RX W HCPCS

## 2025-03-26 PROCEDURE — 4A023N7 MEASUREMENT OF CARDIAC SAMPLING AND PRESSURE, LEFT HEART, PERCUTANEOUS APPROACH: ICD-10-PCS | Performed by: THORACIC SURGERY (CARDIOTHORACIC VASCULAR SURGERY)

## 2025-03-26 PROCEDURE — C1760 CLOSURE DEV, VASC: HCPCS | Performed by: INTERNAL MEDICINE

## 2025-03-26 PROCEDURE — B211YZZ FLUOROSCOPY OF MULTIPLE CORONARY ARTERIES USING OTHER CONTRAST: ICD-10-PCS | Performed by: THORACIC SURGERY (CARDIOTHORACIC VASCULAR SURGERY)

## 2025-03-26 PROCEDURE — B4101ZZ FLUOROSCOPY OF ABDOMINAL AORTA USING LOW OSMOLAR CONTRAST: ICD-10-PCS | Performed by: THORACIC SURGERY (CARDIOTHORACIC VASCULAR SURGERY)

## 2025-03-26 PROCEDURE — 93005 ELECTROCARDIOGRAM TRACING: CPT

## 2025-03-26 PROCEDURE — 6360000002 HC RX W HCPCS: Performed by: INTERNAL MEDICINE

## 2025-03-26 PROCEDURE — 3700000001 HC ADD 15 MINUTES (ANESTHESIA): Performed by: INTERNAL MEDICINE

## 2025-03-26 PROCEDURE — 6360000004 HC RX CONTRAST MEDICATION: Performed by: INTERNAL MEDICINE

## 2025-03-26 PROCEDURE — 33361 REPLACE AORTIC VALVE PERQ: CPT | Performed by: INTERNAL MEDICINE

## 2025-03-26 PROCEDURE — 33210 INSERT ELECTRD/PM CATH SNGL: CPT | Performed by: INTERNAL MEDICINE

## 2025-03-26 PROCEDURE — 6370000000 HC RX 637 (ALT 250 FOR IP)

## 2025-03-26 PROCEDURE — 85347 COAGULATION TIME ACTIVATED: CPT

## 2025-03-26 PROCEDURE — C1769 GUIDE WIRE: HCPCS | Performed by: INTERNAL MEDICINE

## 2025-03-26 PROCEDURE — C1894 INTRO/SHEATH, NON-LASER: HCPCS | Performed by: INTERNAL MEDICINE

## 2025-03-26 PROCEDURE — 85014 HEMATOCRIT: CPT

## 2025-03-26 PROCEDURE — 2060000000 HC ICU INTERMEDIATE R&B

## 2025-03-26 PROCEDURE — 93306 TTE W/DOPPLER COMPLETE: CPT

## 2025-03-26 PROCEDURE — 02RF3JZ REPLACEMENT OF AORTIC VALVE WITH SYNTHETIC SUBSTITUTE, PERCUTANEOUS APPROACH: ICD-10-PCS | Performed by: THORACIC SURGERY (CARDIOTHORACIC VASCULAR SURGERY)

## 2025-03-26 PROCEDURE — 85018 HEMOGLOBIN: CPT

## 2025-03-26 PROCEDURE — 2709999900 HC NON-CHARGEABLE SUPPLY: Performed by: INTERNAL MEDICINE

## 2025-03-26 RX ORDER — ONDANSETRON 2 MG/ML
4 INJECTION INTRAMUSCULAR; INTRAVENOUS EVERY 6 HOURS PRN
Status: DISCONTINUED | OUTPATIENT
Start: 2025-03-26 | End: 2025-03-27 | Stop reason: HOSPADM

## 2025-03-26 RX ORDER — ASPIRIN 81 MG/1
81 TABLET ORAL DAILY
Status: DISCONTINUED | OUTPATIENT
Start: 2025-03-27 | End: 2025-03-27 | Stop reason: HOSPADM

## 2025-03-26 RX ORDER — SODIUM CHLORIDE, SODIUM LACTATE, POTASSIUM CHLORIDE, CALCIUM CHLORIDE 600; 310; 30; 20 MG/100ML; MG/100ML; MG/100ML; MG/100ML
INJECTION, SOLUTION INTRAVENOUS CONTINUOUS
Status: ACTIVE | OUTPATIENT
Start: 2025-03-26 | End: 2025-03-26

## 2025-03-26 RX ORDER — METOPROLOL SUCCINATE 25 MG/1
25 TABLET, EXTENDED RELEASE ORAL DAILY
Status: DISCONTINUED | OUTPATIENT
Start: 2025-03-27 | End: 2025-03-26

## 2025-03-26 RX ORDER — CEFAZOLIN SODIUM/WATER 2 G/20 ML
SYRINGE (ML) INTRAVENOUS
Status: DISCONTINUED | OUTPATIENT
Start: 2025-03-26 | End: 2025-03-26 | Stop reason: SDUPTHER

## 2025-03-26 RX ORDER — SODIUM CHLORIDE 0.9 % (FLUSH) 0.9 %
5-40 SYRINGE (ML) INJECTION PRN
Status: DISCONTINUED | OUTPATIENT
Start: 2025-03-26 | End: 2025-03-26 | Stop reason: HOSPADM

## 2025-03-26 RX ORDER — TROSPIUM CHLORIDE 20 MG/1
20 TABLET, FILM COATED ORAL DAILY
Status: DISCONTINUED | OUTPATIENT
Start: 2025-03-26 | End: 2025-03-27 | Stop reason: HOSPADM

## 2025-03-26 RX ORDER — ACETAMINOPHEN 325 MG/1
650 TABLET ORAL EVERY 4 HOURS PRN
Status: DISCONTINUED | OUTPATIENT
Start: 2025-03-26 | End: 2025-03-27 | Stop reason: HOSPADM

## 2025-03-26 RX ORDER — SODIUM CHLORIDE 0.9 % (FLUSH) 0.9 %
5-40 SYRINGE (ML) INJECTION EVERY 12 HOURS SCHEDULED
Status: DISCONTINUED | OUTPATIENT
Start: 2025-03-26 | End: 2025-03-27 | Stop reason: HOSPADM

## 2025-03-26 RX ORDER — PROTAMINE SULFATE 10 MG/ML
INJECTION, SOLUTION INTRAVENOUS
Status: DISCONTINUED | OUTPATIENT
Start: 2025-03-26 | End: 2025-03-26 | Stop reason: SDUPTHER

## 2025-03-26 RX ORDER — SODIUM CHLORIDE 0.9 % (FLUSH) 0.9 %
5-40 SYRINGE (ML) INJECTION EVERY 12 HOURS SCHEDULED
Status: DISCONTINUED | OUTPATIENT
Start: 2025-03-26 | End: 2025-03-26 | Stop reason: HOSPADM

## 2025-03-26 RX ORDER — METOPROLOL SUCCINATE 25 MG/1
25 TABLET, EXTENDED RELEASE ORAL DAILY
Status: DISCONTINUED | OUTPATIENT
Start: 2025-03-26 | End: 2025-03-27 | Stop reason: HOSPADM

## 2025-03-26 RX ORDER — SODIUM CHLORIDE 9 MG/ML
INJECTION, SOLUTION INTRAVENOUS PRN
Status: DISCONTINUED | OUTPATIENT
Start: 2025-03-26 | End: 2025-03-27 | Stop reason: HOSPADM

## 2025-03-26 RX ORDER — SODIUM CHLORIDE, SODIUM LACTATE, POTASSIUM CHLORIDE, CALCIUM CHLORIDE 600; 310; 30; 20 MG/100ML; MG/100ML; MG/100ML; MG/100ML
INJECTION, SOLUTION INTRAVENOUS
Status: DISCONTINUED | OUTPATIENT
Start: 2025-03-26 | End: 2025-03-26

## 2025-03-26 RX ORDER — HYDRALAZINE HYDROCHLORIDE 20 MG/ML
5 INJECTION INTRAMUSCULAR; INTRAVENOUS ONCE
Status: DISCONTINUED | OUTPATIENT
Start: 2025-03-26 | End: 2025-03-26

## 2025-03-26 RX ORDER — EPHEDRINE SULFATE/0.9% NACL/PF 50 MG/5 ML
SYRINGE (ML) INTRAVENOUS
Status: DISCONTINUED | OUTPATIENT
Start: 2025-03-26 | End: 2025-03-26 | Stop reason: SDUPTHER

## 2025-03-26 RX ORDER — FUROSEMIDE 20 MG/1
20 TABLET ORAL DAILY PRN
Status: DISCONTINUED | OUTPATIENT
Start: 2025-03-26 | End: 2025-03-27 | Stop reason: HOSPADM

## 2025-03-26 RX ORDER — FENTANYL CITRATE 50 UG/ML
INJECTION, SOLUTION INTRAMUSCULAR; INTRAVENOUS
Status: DISCONTINUED | OUTPATIENT
Start: 2025-03-26 | End: 2025-03-26 | Stop reason: SDUPTHER

## 2025-03-26 RX ORDER — ONDANSETRON 4 MG/1
4 TABLET, ORALLY DISINTEGRATING ORAL EVERY 8 HOURS PRN
Status: DISCONTINUED | OUTPATIENT
Start: 2025-03-26 | End: 2025-03-27 | Stop reason: HOSPADM

## 2025-03-26 RX ORDER — AMLODIPINE BESYLATE 5 MG/1
5 TABLET ORAL DAILY
Status: DISCONTINUED | OUTPATIENT
Start: 2025-03-26 | End: 2025-03-27 | Stop reason: HOSPADM

## 2025-03-26 RX ORDER — DEXMEDETOMIDINE HYDROCHLORIDE 100 UG/ML
INJECTION, SOLUTION INTRAVENOUS
Status: DISCONTINUED | OUTPATIENT
Start: 2025-03-26 | End: 2025-03-26 | Stop reason: SDUPTHER

## 2025-03-26 RX ORDER — KETAMINE HYDROCHLORIDE 50 MG/ML
INJECTION, SOLUTION INTRAMUSCULAR; INTRAVENOUS
Status: DISCONTINUED | OUTPATIENT
Start: 2025-03-26 | End: 2025-03-26 | Stop reason: SDUPTHER

## 2025-03-26 RX ORDER — SENNOSIDES A AND B 8.6 MG/1
1 TABLET, FILM COATED ORAL DAILY PRN
Status: DISCONTINUED | OUTPATIENT
Start: 2025-03-26 | End: 2025-03-27 | Stop reason: HOSPADM

## 2025-03-26 RX ORDER — LIDOCAINE HYDROCHLORIDE 10 MG/ML
INJECTION, SOLUTION INFILTRATION; PERINEURAL PRN
Status: DISCONTINUED | OUTPATIENT
Start: 2025-03-26 | End: 2025-03-26 | Stop reason: HOSPADM

## 2025-03-26 RX ORDER — LEVOTHYROXINE SODIUM 25 UG/1
25 TABLET ORAL
Status: DISCONTINUED | OUTPATIENT
Start: 2025-03-27 | End: 2025-03-27 | Stop reason: HOSPADM

## 2025-03-26 RX ORDER — UBIDECARENONE 75 MG
100 CAPSULE ORAL DAILY
Status: DISCONTINUED | OUTPATIENT
Start: 2025-03-26 | End: 2025-03-27 | Stop reason: HOSPADM

## 2025-03-26 RX ORDER — METOPROLOL SUCCINATE 25 MG/1
25 TABLET, EXTENDED RELEASE ORAL DAILY
Status: DISCONTINUED | OUTPATIENT
Start: 2025-03-26 | End: 2025-03-26

## 2025-03-26 RX ORDER — SODIUM CHLORIDE 9 MG/ML
INJECTION, SOLUTION INTRAVENOUS PRN
Status: DISCONTINUED | OUTPATIENT
Start: 2025-03-26 | End: 2025-03-26 | Stop reason: HOSPADM

## 2025-03-26 RX ORDER — SODIUM CHLORIDE 0.9 % (FLUSH) 0.9 %
5-40 SYRINGE (ML) INJECTION PRN
Status: DISCONTINUED | OUTPATIENT
Start: 2025-03-26 | End: 2025-03-27 | Stop reason: HOSPADM

## 2025-03-26 RX ORDER — HEPARIN SODIUM 1000 [USP'U]/ML
INJECTION, SOLUTION INTRAVENOUS; SUBCUTANEOUS
Status: DISCONTINUED | OUTPATIENT
Start: 2025-03-26 | End: 2025-03-26 | Stop reason: SDUPTHER

## 2025-03-26 RX ORDER — EZETIMIBE 10 MG/1
10 TABLET ORAL DAILY
Status: DISCONTINUED | OUTPATIENT
Start: 2025-03-26 | End: 2025-03-27 | Stop reason: HOSPADM

## 2025-03-26 RX ORDER — IOPAMIDOL 755 MG/ML
INJECTION, SOLUTION INTRAVASCULAR PRN
Status: DISCONTINUED | OUTPATIENT
Start: 2025-03-26 | End: 2025-03-26 | Stop reason: HOSPADM

## 2025-03-26 RX ADMIN — ACETAMINOPHEN 650 MG: 325 TABLET ORAL at 12:51

## 2025-03-26 RX ADMIN — PHENYLEPHRINE HYDROCHLORIDE 20 MCG/MIN: 10 INJECTION INTRAVENOUS at 08:02

## 2025-03-26 RX ADMIN — DEXMEDETOMIDINE 10 MCG: 100 INJECTION, SOLUTION INTRAVENOUS at 07:30

## 2025-03-26 RX ADMIN — HEPARIN SODIUM 3000 UNITS: 1000 INJECTION, SOLUTION INTRAVENOUS; SUBCUTANEOUS at 09:03

## 2025-03-26 RX ADMIN — ACETAMINOPHEN 650 MG: 325 TABLET ORAL at 19:25

## 2025-03-26 RX ADMIN — HEPARIN SODIUM 11000 UNITS: 1000 INJECTION, SOLUTION INTRAVENOUS; SUBCUTANEOUS at 08:51

## 2025-03-26 RX ADMIN — EZETIMIBE 10 MG: 10 TABLET ORAL at 11:17

## 2025-03-26 RX ADMIN — DEXMEDETOMIDINE 10 MCG: 100 INJECTION, SOLUTION INTRAVENOUS at 07:25

## 2025-03-26 RX ADMIN — PHENYLEPHRINE HYDROCHLORIDE 80 MCG: 10 INJECTION INTRAVENOUS at 09:16

## 2025-03-26 RX ADMIN — DEXMEDETOMIDINE 10 MCG: 100 INJECTION, SOLUTION INTRAVENOUS at 07:20

## 2025-03-26 RX ADMIN — METOPROLOL SUCCINATE 25 MG: 25 TABLET, EXTENDED RELEASE ORAL at 19:55

## 2025-03-26 RX ADMIN — AMLODIPINE BESYLATE 5 MG: 5 TABLET ORAL at 11:17

## 2025-03-26 RX ADMIN — PROTAMINE SULFATE 5 MG: 10 INJECTION, SOLUTION INTRAVENOUS at 09:13

## 2025-03-26 RX ADMIN — METOPROLOL SUCCINATE 25 MG: 25 TABLET, EXTENDED RELEASE ORAL at 12:35

## 2025-03-26 RX ADMIN — SODIUM CHLORIDE, SODIUM LACTATE, POTASSIUM CHLORIDE, AND CALCIUM CHLORIDE: .6; .31; .03; .02 INJECTION, SOLUTION INTRAVENOUS at 11:14

## 2025-03-26 RX ADMIN — Medication 10 MG: at 08:31

## 2025-03-26 RX ADMIN — PROPOFOL 50 MCG/KG/MIN: 10 INJECTION, EMULSION INTRAVENOUS at 07:52

## 2025-03-26 RX ADMIN — FENTANYL CITRATE 25 MCG: 50 INJECTION, SOLUTION INTRAMUSCULAR; INTRAVENOUS at 07:55

## 2025-03-26 RX ADMIN — SODIUM CHLORIDE, PRESERVATIVE FREE 10 ML: 5 INJECTION INTRAVENOUS at 11:14

## 2025-03-26 RX ADMIN — Medication 2 G: at 08:00

## 2025-03-26 RX ADMIN — KETAMINE HYDROCHLORIDE 20 MG: 50 INJECTION, SOLUTION, CONCENTRATE INTRAMUSCULAR; INTRAVENOUS at 07:55

## 2025-03-26 RX ADMIN — SODIUM CHLORIDE: 9 INJECTION, SOLUTION INTRAVENOUS at 07:45

## 2025-03-26 RX ADMIN — PROTAMINE SULFATE 95 MG: 10 INJECTION, SOLUTION INTRAVENOUS at 09:15

## 2025-03-26 RX ADMIN — ACETAMINOPHEN 650 MG: 325 TABLET ORAL at 23:10

## 2025-03-26 ASSESSMENT — PAIN DESCRIPTION - ORIENTATION
ORIENTATION: MID
ORIENTATION: LOWER

## 2025-03-26 ASSESSMENT — PAIN DESCRIPTION - DESCRIPTORS
DESCRIPTORS: ACHING
DESCRIPTORS: ACHING;DISCOMFORT
DESCRIPTORS: ACHING

## 2025-03-26 ASSESSMENT — PAIN DESCRIPTION - LOCATION
LOCATION: BACK

## 2025-03-26 ASSESSMENT — PAIN SCALES - GENERAL
PAINLEVEL_OUTOF10: 8
PAINLEVEL_OUTOF10: 4
PAINLEVEL_OUTOF10: 8
PAINLEVEL_OUTOF10: 5

## 2025-03-26 ASSESSMENT — EJECTION FRACTION
EF_VALUE: .39
EF_VALUE: .29

## 2025-03-26 ASSESSMENT — PAIN SCALES - WONG BAKER: WONGBAKER_NUMERICALRESPONSE: NO HURT

## 2025-03-26 NOTE — CARDIO/PULMONARY
Chart reviewed: Patient is 79 y.o. female admitted with Aortic stenosis [I35.0]  Aortic stenosis, severe [I35.0]    Education: Trans-catheter education folder at the bedside.  Met with Cristiane Centeno.     Educated using teach back method. Reviewed daily weights and temperatures, signs and symptoms of infection at surgery sites, valve type and card, and use of incentive spirometer.  Smoking history assessed. Patient is a non smoker.Encouraged Heart Healthy, Low Sodium (2000 mg) diet. Discussed purpose of bracelet, duration of wear, and when to call surgeon's office.     Discussed Cardiac Rehab Program benefits, format, and encouraged participation. Initial Cardiac Rehab Program intake appointment scheduled for April 17, 2025  10:30am and appointment information is on the AVS. General questions answered. Cristiane Centeno verbalized understanding.     Will continue to follow for educational needs and enrollment in the Cardiac Rehab Program.     Lisa Alfredo RN

## 2025-03-26 NOTE — ANESTHESIA PRE PROCEDURE
anesthetic complications (delayed awakening):   Airway: Mallampati: II  TM distance: >3 FB   Neck ROM: full  Mouth opening: > = 3 FB   Dental: normal exam         Pulmonary:normal exam                               Cardiovascular:  Exercise tolerance: poor (<4 METS)  (+) hypertension:, valvular problems/murmurs: AS, murmur, Aortic, hyperlipidemia        Rhythm: regular  Rate: normal                 ROS comment: TTE: Severe AS, normal LV/RV systolic function     Neuro/Psych:   (+) CVA:, psychiatric history:            GI/Hepatic/Renal:   (+) hiatal hernia, GERD: well controlled          Endo/Other:    (+) hypothyroidism: arthritis:..                 Abdominal:             Vascular:          Other Findings:         Anesthesia Plan      general     ASA 4       Induction: intravenous.  arterial line  MIPS: Prophylactic antiemetics administered.  Anesthetic plan and risks discussed with patient.    Use of blood products discussed with patient whom consented to blood products.    Plan discussed with CRNA.                Manoj Glover MD   3/26/2025

## 2025-03-26 NOTE — H&P
Update History & Physical    The patient's History and Physical of March 24, 2025 was reviewed with the patient and I examined the patient. There was no change. The surgical site was confirmed by the patient and me.       Plan: The risks, benefits, expected outcome, and alternative to the recommended procedure have been discussed with the patient. Patient understands and wants to proceed with the procedure.     Electronically signed by JOSSELIN Siddiqui NP on 3/26/2025 at 7:26 AM

## 2025-03-26 NOTE — DISCHARGE INSTRUCTIONS
Cardiac Surgery Specialist - Valve Clinic    [address]      [place] [address]  Office- [phone]  Fax- [phone]  _____________________________________________________________  Dr. El Keller, [facility]   Dr. Manuel Riddle, [facility]  Dr. Catrachita Gutierrez, [facility]     Nisa Matthew, MSN, ACNPC-AG  Char Faulkner, MSN, AGPCNP-BC  ___________________________________________________________     Surgery & Date: Procedure(s):  Transcatheter aortic valve replacement  Aortic root aortogram  Ultrasound guided vascular access  Aortagram abdominal w runoff  Insert temporary pacemaker    Patient ID:  Cristiane Centeno  [id number]  79 y.o.  1945    Admit Date: 3/26/2025    Discharge Date: 3/26/2025     Admitting Physician: [unfilled]     Discharge Physician: [unfilled]    Admission Diagnoses:   Aortic stenosis [I35.0]  Aortic stenosis, severe [I35.0]    Discharge Diagnoses:   [unfilled]    Discharge Condition: {condition:75861168}    Cardiology Procedures this Admission:  {CARDIOLOGY PROCEDURES:35258470}    Disposition: {disposition:00740}    Reference discharge instructions provided by nursing for diet and activity.    Signed:  JOSSELIN Siddiqui NP  3/26/2025  9:17 AM        MEDICATIONS:  Please refer to your After Visit Summary for your medication list.           Guidelines to Follow After Your TAVR  The purpose of these instructions is to provide you with information on how to care for yourself after your Transcatheter Aortic Valve Replacement (TAVR). If you have any questions after your discharge, please call us at [phone].  Prior to discharge, you will receive a temporary implant card. A permanent card will be sent to you in approximately 6 to 8 weeks. Share this card with your doctors, including your

## 2025-03-26 NOTE — PROGRESS NOTES
Cardiac Cath Lab Recovery Arrival Note:      Cristiane Centeno arrived to Cardiac Cath Lab, Recovery Area. Staff introduced to patient. Patient identifiers verified with NAME and DATE OF BIRTH. Procedure verified with patient. Consent forms reviewed and signed by patient or authorized representative and verified. Allergies verified.     Patient and family oriented to department. Patient and family informed of procedure and plan of care.     Questions answered with review. Patient prepped for procedure, per orders from physician, prior to arrival.    Patient on cardiac monitor, non-invasive blood pressure, SPO2 monitor. On RA. Patient is A&Ox 4. Patient reports no pain.     Patient in stretcher, in low position, with side rails up, call bell within reach, patient instructed to call if assistance as needed.    Patient prep in: [facility] Recovery Area, [place] [address].   Patient family has pager #   Family in: [place].   Prep by: emily

## 2025-03-26 NOTE — ANESTHESIA PROCEDURE NOTES
Arterial Line:    An arterial line was placed using surface landmarks, in the pre-op for the following indication(s): continuous blood pressure monitoring.    A 20 gauge (size), 4.45 cm (length), Arrow (type) catheter was placed, Seldinger technique not used, into the left radial artery, secured by Tegaderm.  Anesthesia type: Local    Events:  patient tolerated procedure well with no complications.3/26/2025 7:20 AM3/26/2025 7:30 AM  Anesthesiologist: Manoj Glover MD  Resident/CRNA: Ti Moran APRN - CRNA  Other anesthesia staff: Max Bates RN  Performed: Other staff   Preanesthetic Checklist  Completed: patient identified, IV checked, site marked, risks and benefits discussed, surgical/procedural consents, equipment checked, pre-op evaluation, timeout performed, anesthesia consent given, oxygen available, monitors applied/VS acknowledged, fire risk safety assessment completed and verbalized and blood product R/B/A discussed and consented

## 2025-03-26 NOTE — OP NOTE
Operative Note      Transcatheter Aortic Valve Replacement Percutaneously through the Femoral Artery     Indication  The valve was placed for severe aortic stenosis. Low risk.     Interventional cardiologist: Mnauel Riddle MD.  Cardiac surgeon: Ravi Duong MD.     Access  The valve was placed using a transfemoral approach.   Initially a 6Fr sheath was placed in the RFA, 6Fr long sheath in LFV, 6Fr sheath in the LFA using ultrasound and micropuncture technique.  The LFV was used to place temporary venous wire.  The LFA was used to place a pig tail for angiography.  The RFA was preclosed with 2 perclose sutures.   Then delivery sheath was placed and patient heparinized.     Procedure     Prior to placement of the aortic valve, an aortogram were performed.  Left heart catheterization was peformed.  A balloon aortic valvuloplasty was not performed prior to valve placement.  A 23 mm Viramontes Anjel S3 Resilia aortic valve was then deployed during rapid ventricular pacing. Post-deployment balloon inflation was not performed.  Cardiopulmonary bypass support was not used for hemodynamic support during the procedure.     At the completion of the case, catheters were removed. Protamine was given. The valve delivery sheath was removed and hemostasis obtained by 2 perclose suture.  6Fr LFA hemostasis via external pressure. Other venous sheaths were removed and hemostasis obtained by manual compression for 15 minutes.     Hemodynamics     Pre- deplyment hemodynamics showed severe aortic stenosis with mean gradient of 35 mm Hg, with LVEDP of 20 mm Hg.  Post-deployment hemodynamics showed transvalvular mean gradient of 3 mm Hg, with LVEDP of 10 mm Hg.     Angiography  1. Aortic angiography pre intervention demonstrates severe AS and mild AI.  2. Aortic angiography post TAVR demonstrates the valve in good position with no AI with patent coronary arteries.  3. Abdominal aortography showed patent vessels with patent RFA with good

## 2025-03-26 NOTE — PROGRESS NOTES
Clinical Update:    Cristiane ABDIRASHID Narayaner seen on IVCU s/p TAVR with 23 mm Viramontes Anjel S3 Resilia  with Dr. Duong and with Dr. Riddle under MAC anesthesia on 3/26/25.      Patient is awake and alert . Access sites soft with gauze dressing-clean, dry, intact. PPP.     Anticoagulation plan for ASA. Plan for postoperative TTE and EKG.    Vitals:    03/26/25 1033   BP: (!) 151/67   Pulse: 67   Resp: 21   Temp:    SpO2: 97%           Signed:  JOSSELIN Siddiqui - NP  3/26/2025  10:57 AM

## 2025-03-26 NOTE — PROGRESS NOTES
SHEATH PULL NOTE:    Patient informed of procedure with questions answered with review. Sheath site prepped with Chloraprep swab. 6 fr sheath in left fem (art and halina) pulled by LEIDA diaz. Hand hold and manual pressure, with manual compression to site. No bleeding, no hematoma, no pain at site. Hemostasis obtained with hand hold/manual compression at site. Patient tolerated well. No change in status. Handhold for 10 minutes. No change at site. Quick clot and tegaderm dressing applied to site. No bleeding, no hematoma, no pain/discomfort at site. Groin instructions provided with review. Continue to monitor procedure site and patient status.    *Advised patient to keep head flat and extremity flat to decrease risk of bleeding.    *Recommended that patient not drink for ONE HOUR post sheath pull completion.    *Recommended that patient not eat for TWO HOURS post sheath pull completion.    *Instructed patient on rationale for delay of PO products to decrease risk for aspiration and if additional treatment to procedure site is required. Patient verbalized understanding of instructions with review.

## 2025-03-26 NOTE — PROGRESS NOTES
Post-Structural Heart End of Shift Note/Checklist    Cristiane Centeno  POD: * Day of Surgery *    Procedure:  Procedure(s):  Transcatheter aortic valve replacement  Aortic root aortogram  Ultrasound guided vascular access  Aortagram abdominal w runoff  Insert temporary pacemaker    Shift: 5797-2751  Significant events or complaints during shift: None.  Cardiac rhythm: NSR  Vital Signs: Vital signs stable at this time. Earlier shift 's. Norvasc and Metoprolol given as ordered  O2 status: On room air  Febrile/afebrile: Afebrile  Current infusions: none.    Number of times and distance patient has ambulated: Tolerated ambulating to and from bathroom.   How is patient tolerating ambulation? Patient tolerated ambulating to and from bathroom.     Patient diet: Cardiac diet.  Intake of diet: 100%.  Is the patient tolerating eating?  Yes.  Is the patient voiding without difficulty? Yes.  Is the patient passing gas? Yes  Date of last BM: 03/26/2025.      Checklist:   Are I&O documented and accurate? Yes.  .               Fall with Harm Risk

## 2025-03-26 NOTE — ANESTHESIA POSTPROCEDURE EVALUATION
Post-Anesthesia Evaluation and Assessment    Patient: Cristiane Centeno MRN: [id number]  SSN: [id number]    YOB: 1945  Age: 79 y.o.  Sex: female      I have evaluated the patient and they are stable and ready for discharge from the PACU.     Cardiovascular Function/Vital Signs  Visit Vitals  BP (!) 178/75   Pulse 62   Temp 97.7 °F (36.5 °C) (Oral)   Resp 12   Ht 1.549 m (5' 0.98\")   Wt 85.3 kg (188 lb)   SpO2 99%   BMI 35.54 kg/m²       Patient is status post General anesthesia for Procedure(s):  Transcatheter aortic valve replacement  Aortic root aortogram  Ultrasound guided vascular access  Aortagram abdominal w runoff  Insert temporary pacemaker.    Nausea/Vomiting: None    Postoperative hydration reviewed and adequate.    Pain:      Managed    Neurological Status:       At baseline    Mental Status, Level of Consciousness: Alert and  oriented to person, place, and time    Pulmonary Status:       Adequate oxygenation and airway patent    Complications related to anesthesia: None    Post-anesthesia assessment completed. No concerns    Signed By: Manoj Glover MD     March 26, 2025

## 2025-03-27 ENCOUNTER — APPOINTMENT (OUTPATIENT)
Facility: HOSPITAL | Age: 80
DRG: 267 | End: 2025-03-27
Attending: INTERNAL MEDICINE
Payer: MEDICARE

## 2025-03-27 VITALS
SYSTOLIC BLOOD PRESSURE: 148 MMHG | BODY MASS INDEX: 35.46 KG/M2 | RESPIRATION RATE: 18 BRPM | OXYGEN SATURATION: 95 % | HEIGHT: 61 IN | HEART RATE: 62 BPM | DIASTOLIC BLOOD PRESSURE: 73 MMHG | TEMPERATURE: 97.9 F | WEIGHT: 187.83 LBS

## 2025-03-27 LAB
ANION GAP SERPL CALC-SCNC: 7 MMOL/L (ref 2–12)
BUN SERPL-MCNC: 15 MG/DL (ref 6–20)
BUN/CREAT SERPL: 19 (ref 12–20)
CALCIUM SERPL-MCNC: 8.9 MG/DL (ref 8.5–10.1)
CHLORIDE SERPL-SCNC: 106 MMOL/L (ref 97–108)
CO2 SERPL-SCNC: 24 MMOL/L (ref 21–32)
CREAT SERPL-MCNC: 0.79 MG/DL (ref 0.55–1.02)
ERYTHROCYTE [DISTWIDTH] IN BLOOD BY AUTOMATED COUNT: 13.6 % (ref 11.5–14.5)
GLUCOSE SERPL-MCNC: 97 MG/DL (ref 65–100)
HCT VFR BLD AUTO: 42.9 % (ref 35–47)
HGB BLD-MCNC: 13.9 G/DL (ref 11.5–16)
MAGNESIUM SERPL-MCNC: 2 MG/DL (ref 1.6–2.4)
MCH RBC QN AUTO: 30.2 PG (ref 26–34)
MCHC RBC AUTO-ENTMCNC: 32.4 G/DL (ref 30–36.5)
MCV RBC AUTO: 93.1 FL (ref 80–99)
NRBC # BLD: 0 K/UL (ref 0–0.01)
NRBC BLD-RTO: 0 PER 100 WBC
PLATELET # BLD AUTO: 228 K/UL (ref 150–400)
PMV BLD AUTO: 9.9 FL (ref 8.9–12.9)
POTASSIUM SERPL-SCNC: 3.9 MMOL/L (ref 3.5–5.1)
RBC # BLD AUTO: 4.61 M/UL (ref 3.8–5.2)
SODIUM SERPL-SCNC: 137 MMOL/L (ref 136–145)
WBC # BLD AUTO: 8.5 K/UL (ref 3.6–11)

## 2025-03-27 PROCEDURE — 6370000000 HC RX 637 (ALT 250 FOR IP)

## 2025-03-27 PROCEDURE — 71045 X-RAY EXAM CHEST 1 VIEW: CPT

## 2025-03-27 PROCEDURE — 80048 BASIC METABOLIC PNL TOTAL CA: CPT

## 2025-03-27 PROCEDURE — 83735 ASSAY OF MAGNESIUM: CPT

## 2025-03-27 PROCEDURE — 93005 ELECTROCARDIOGRAM TRACING: CPT | Performed by: INTERNAL MEDICINE

## 2025-03-27 PROCEDURE — 36415 COLL VENOUS BLD VENIPUNCTURE: CPT

## 2025-03-27 PROCEDURE — 85027 COMPLETE CBC AUTOMATED: CPT

## 2025-03-27 RX ADMIN — LEVOTHYROXINE SODIUM 25 MCG: 0.03 TABLET ORAL at 06:05

## 2025-03-27 RX ADMIN — ACETAMINOPHEN 650 MG: 325 TABLET ORAL at 03:17

## 2025-03-27 RX ADMIN — AMLODIPINE BESYLATE 5 MG: 5 TABLET ORAL at 10:01

## 2025-03-27 RX ADMIN — EZETIMIBE 10 MG: 10 TABLET ORAL at 09:53

## 2025-03-27 RX ADMIN — VITAM B12 100 MCG: 100 TAB at 09:53

## 2025-03-27 RX ADMIN — ASPIRIN 81 MG: 81 TABLET, COATED ORAL at 09:53

## 2025-03-27 ASSESSMENT — PAIN DESCRIPTION - DESCRIPTORS: DESCRIPTORS: ACHING;DISCOMFORT

## 2025-03-27 ASSESSMENT — PAIN SCALES - GENERAL: PAINLEVEL_OUTOF10: 6

## 2025-03-27 ASSESSMENT — PAIN DESCRIPTION - LOCATION: LOCATION: BACK

## 2025-03-27 ASSESSMENT — PAIN DESCRIPTION - ORIENTATION: ORIENTATION: LOWER

## 2025-03-27 NOTE — PROGRESS NOTES
Predictive Model Details          24 (Normal)  Factor Value    Calculated 3/27/2025 08:31 54% Age 79 years old    Deterioration Index Model 17% Cardiac rhythm LBB     10% Respiratory rate 18     10% Systolic 148     4% Sodium 137 mmol/L     2% WBC count 8.5 K/uL     2% Pulse 62     1% Hematocrit 42.9 %     0% Temperature 97.9 °F (36.6 °C)     0% Pulse oximetry 95 %     0% Potassium 3.9 mmol/L      10:43 AM Pt ready for discharge. RN reviewed all medications. RN reviewed how to follow up with the cardiologist. RN reviewed post-TAVR instructions and right and left groin site care instructions. Pt verbalized understanding. Spouse at bedside for discharge instructions. RN provided time to answer any and all questions.

## 2025-03-27 NOTE — PROGRESS NOTES
[facility] FLOOR Progress Note    Admit Date: 3/26/2025  POD: 1 Day Post-Op      Procedure:  Procedure(s):  Transcatheter aortic valve replacement  Aortic root aortogram  Ultrasound guided vascular access  Aortagram abdominal w runoff  Insert temporary pacemaker    Subjective/overnight events:   Pt seen with Dr. Yañez, up in the chair.. In NSR, on room air, afebrile.   Vital signs stable. No events overnight. Bilateral groin sites stable, EKG shows NSR with BBB, labs stable, eating, drinking, ambulating at baseline, and voiding. No complaints at present.  Objective:     BP (!) 148/73   Pulse 62   Temp 97.9 °F (36.6 °C) (Oral)   Resp 18   Ht 1.549 m (5' 0.98\")   Wt 85.2 kg (187 lb 13.3 oz)   SpO2 95%   BMI 35.51 kg/m²   Temp (24hrs), Av.8 °F (36.6 °C), Min:97.6 °F (36.4 °C), Max:98 °F (36.7 °C)      Last 24hr Input/Output:    Intake/Output Summary (Last 24 hours) at 3/27/2025 0814  Last data filed at 3/26/2025 1800  Gross per 24 hour   Intake 1654 ml   Output 1500 ml   Net 154 ml        EKG/Rhythm: Completed but not transmitted- has been reviewed by Dr. Riddle      Oxygen: As above    CXR: Xray Result (most recent):  XR CHEST (2 VW) 2025    Narrative  EXAM: XR CHEST (2 VW)    INDICATION:  pre-op    COMPARISON: None    TECHNIQUE: PA and lateral chest views    FINDINGS: The cardiac size is within normal limits. The pulmonary vasculature is  within normal limits.    The lungs and pleural spaces are clear. Minimal interstitial prominence. The  visualized bones and upper abdomen are age-appropriate.    Impression  No acute findings.      Electronically signed by CHRISTINE RAE MD      Echo: 25    ECHO (TTE) COMPLETE (PRN CONTRAST/BUBBLE/STRAIN/3D) 2025  4:43 PM (Final)    Interpretation Summary    Left Ventricle: Normal left ventricular systolic function with a visually estimated EF of 55 - 60%. Left ventricle size is normal. Normal wall thickness. Normal wall motion.    Aortic Valve: Bioprosthetic

## 2025-03-27 NOTE — PLAN OF CARE
Problem: Chronic Conditions and Co-morbidities  Goal: Patient's chronic conditions and co-morbidity symptoms are monitored and maintained or improved  Outcome: Completed  Flowsheets (Taken 3/27/2025 0715)  Care Plan - Patient's Chronic Conditions and Co-Morbidity Symptoms are Monitored and Maintained or Improved: Monitor and assess patient's chronic conditions and comorbid symptoms for stability, deterioration, or improvement     Problem: Discharge Planning  Goal: Discharge to home or other facility with appropriate resources  Outcome: Completed  Flowsheets (Taken 3/27/2025 0715)  Discharge to home or other facility with appropriate resources: Identify barriers to discharge with patient and caregiver     Problem: Safety - Adult  Goal: Free from fall injury  Outcome: Completed     Problem: ABCDS Injury Assessment  Goal: Absence of physical injury  Outcome: Completed

## 2025-03-27 NOTE — PLAN OF CARE
End of Shift Note    Post-Structural Heart End of Shift Note/Checklist    Cristiane Centeno  POD: 1 Day Post-Op    Procedure:  Procedure(s):  Transcatheter aortic valve replacement  Aortic root aortogram  Ultrasound guided vascular access  Aortagram abdominal w runoff  Insert temporary pacemaker    Shift: 5461-1074  Significant events or complaints during shift: None.  Cardiac rhythm: NSR  Vital Signs: Vital signs stable  O2 status: On room air  Febrile/afebrile: afebrile  Current infusions: none.    Number of times and distance patient has ambulated: to bathroom x2 .   How is patient tolerating ambulation? Well  .     Patient diet: Cardiac diet.  Intake of diet: n/a.  Is the patient tolerating eating?  Yes.  Is the patient voiding without difficulty? Yes.  Is the patient passing gas? Yes  Date of last BM: 3/26.      Checklist:   Are I&O documented and accurate? Yes.  For nightshift, is a daily weight documented? Yes.  Was this weight standing? Yes.  Are labs drawn and resulted? Yes.  Has the patient's post-procedure echo been done yet? Yes.  Post-TAVR patients without an existing PPM- is the EKG done and has it transmitted to Epic? Yes.  Have dressings been removed? Yes.              Talita Robb, RN  Predictive Model Details          18 (Normal)  Factor Value    Calculated 3/26/2025 20:44 86% Age 79 years old    Deterioration Index Model 12% Systolic 142     1% Pulse 66     1% Hematocrit 41.5 %     1% Temperature 97.8 °F (36.6 °C)     0% Pulse oximetry 95 %     0% Respiratory rate 16        Problem: Chronic Conditions and Co-morbidities  Goal: Patient's chronic conditions and co-morbidity symptoms are monitored and maintained or improved  3/26/2025 2044 by Talita Robb, RN  Outcome: Progressing  Flowsheets (Taken 3/26/2025 1950)  Care Plan - Patient's Chronic Conditions and Co-Morbidity Symptoms are Monitored and Maintained or Improved: Monitor and assess patient's chronic conditions and comorbid symptoms for

## 2025-03-27 NOTE — DISCHARGE SUMMARY
.6 01/22/2024 12:56 PM    VLDL 28.6 01/27/2025 11:51 AM    VLDL 19 09/02/2020 10:15 AM     No results found for: \"HBA1C\", \"[id number]\"          Disposition: Home    Patient Instructions:   Current Discharge Medication List        CONTINUE these medications which have NOT CHANGED    Details   Multiple Vitamins-Minerals (HAIR SKIN & NAILS PO) Take 2 tablets by mouth daily      vitamin D 25 MCG (1000 UT) CAPS Take by mouth daily      mirabegron (MYRBETRIQ) 50 MG TB24 Take 50 mg by mouth daily      amLODIPine (NORVASC) 5 MG tablet Take 1 tablet by mouth daily  Qty: 90 tablet, Refills: 3      levothyroxine (SYNTHROID) 25 MCG tablet Take 1 tablet by mouth every morning (before breakfast)  Qty: 90 tablet, Refills: 3      ezetimibe (ZETIA) 10 MG tablet Take 1 tablet by mouth daily  Qty: 90 tablet, Refills: 1      Sennosides 25 MG TABS Take 1 tablet by mouth daily as needed      furosemide (LASIX) 20 MG tablet Take 1 tablet by mouth daily as needed (Take 1 Tablet by mouth daily as needed for PRN Reason (Other) (Edema). Indications: visible water retention)  Qty: 90 tablet, Refills: 1      aspirin 81 MG EC tablet Take by mouth daily      !! Multiple Vitamins-Minerals (PRESERVISION AREDS 2 PO) Take by mouth in the morning and at bedtime      Cranberry-Vitamin C-Vitamin E 140-100-3 MG-MG-UNIT CAPS Take 500 mg by mouth in the morning and at bedtime      vitamin E 400 UNIT capsule Take by mouth daily      !! Multiple Vitamins-Minerals (MULTIVITAMIN GUMMIES ADULT PO) Take 2 tablets by mouth daily      Cyanocobalamin (VITAMIN B-12 PO) Take by mouth daily      acetaminophen (TYLENOL) 500 MG tablet Take by mouth 3 times daily      Biotin 10 MG CAPS Take 2 tablets by mouth every evening      metoprolol succinate (TOPROL XL) 25 MG extended release tablet Take 1 tablet by mouth daily  Qty: 90 tablet, Refills: 3      !! NONFORMULARY Take 100 mg by mouth daily Jaylen Red      !! NONFORMULARY Hair skin nails multivitamin gummies

## 2025-03-29 LAB
EKG ATRIAL RATE: 63 BPM
EKG ATRIAL RATE: 65 BPM
EKG DIAGNOSIS: NORMAL
EKG DIAGNOSIS: NORMAL
EKG P AXIS: 58 DEGREES
EKG P AXIS: 68 DEGREES
EKG P-R INTERVAL: 158 MS
EKG P-R INTERVAL: 172 MS
EKG Q-T INTERVAL: 480 MS
EKG Q-T INTERVAL: 514 MS
EKG QRS DURATION: 136 MS
EKG QRS DURATION: 148 MS
EKG QTC CALCULATION (BAZETT): 491 MS
EKG QTC CALCULATION (BAZETT): 534 MS
EKG R AXIS: -50 DEGREES
EKG R AXIS: -56 DEGREES
EKG T AXIS: 114 DEGREES
EKG T AXIS: 124 DEGREES
EKG VENTRICULAR RATE: 63 BPM
EKG VENTRICULAR RATE: 65 BPM

## 2025-04-03 ENCOUNTER — OFFICE VISIT (OUTPATIENT)
Age: 80
End: 2025-04-03
Payer: MEDICARE

## 2025-04-03 DIAGNOSIS — Z95.2 S/P TAVR (TRANSCATHETER AORTIC VALVE REPLACEMENT): Primary | ICD-10-CM

## 2025-04-03 PROCEDURE — 1123F ACP DISCUSS/DSCN MKR DOCD: CPT | Performed by: NURSE PRACTITIONER

## 2025-04-03 PROCEDURE — G8428 CUR MEDS NOT DOCUMENT: HCPCS | Performed by: NURSE PRACTITIONER

## 2025-04-03 PROCEDURE — G8417 CALC BMI ABV UP PARAM F/U: HCPCS | Performed by: NURSE PRACTITIONER

## 2025-04-03 PROCEDURE — 1090F PRES/ABSN URINE INCON ASSESS: CPT | Performed by: NURSE PRACTITIONER

## 2025-04-03 PROCEDURE — G8400 PT W/DXA NO RESULTS DOC: HCPCS | Performed by: NURSE PRACTITIONER

## 2025-04-03 PROCEDURE — 99212 OFFICE O/P EST SF 10 MIN: CPT | Performed by: NURSE PRACTITIONER

## 2025-04-03 PROCEDURE — 1111F DSCHRG MED/CURRENT MED MERGE: CPT | Performed by: NURSE PRACTITIONER

## 2025-04-03 PROCEDURE — 1036F TOBACCO NON-USER: CPT | Performed by: THORACIC SURGERY (CARDIOTHORACIC VASCULAR SURGERY)

## 2025-04-03 NOTE — PROGRESS NOTES
Patient: Cristiane Centeno   Age: 79 y.o.     Patient Care Team:  Alexis Driscoll MD as PCP - General  Alexis Driscoll MD as PCP - Empaneled Provider  Latoya Garrido MD as Physician  Yarelis Ayala APRN - NP as Nurse Practitioner  Manuel Riddle MD as Consulting Physician (Cardiothoracic Surgery)  Scott Duong MD (Cardiothoracic Surgery)    Diagnosis: The encounter diagnosis was S/P TAVR (transcatheter aortic valve replacement).    Problem List:   Patient Active Problem List   Diagnosis    Nonrheumatic aortic valve stenosis    Heart murmur    Status post total knee replacement    Advanced care planning/counseling discussion    Hypothyroidism    DJD (degenerative joint disease)    Diverticulosis    Overactive bladder    Obesity    Essential hypertension, benign    Overweight    DDD (degenerative disc disease), lumbar    Pure hypercholesterolemia    Aortic stenosis, severe        This service was provided thru telehealth, between Fide Valencia NP at Cardiac Surgery Specialist's office and Cristiane Centeno  at their home.     Date of Surgery: 3/26/25     Surgery: TAVR    HPI:  Called pt for post op FU. No CP, breathing better. LE edema is the same is prior to surgery. She reports that her groin site is healed.     Current Medications:   Current Outpatient Medications   Medication Sig Dispense Refill    Multiple Vitamins-Minerals (HAIR SKIN & NAILS PO) Take 2 tablets by mouth daily      vitamin D 25 MCG (1000 UT) CAPS Take by mouth daily      mirabegron (MYRBETRIQ) 50 MG TB24 Take 50 mg by mouth daily      amLODIPine (NORVASC) 5 MG tablet Take 1 tablet by mouth daily 90 tablet 3    metoprolol succinate (TOPROL XL) 25 MG extended release tablet Take 1 tablet by mouth daily 90 tablet 3    levothyroxine (SYNTHROID) 25 MCG tablet Take 1 tablet by mouth every morning (before breakfast) 90 tablet 3    ezetimibe (ZETIA) 10 MG tablet Take 1 tablet by mouth daily 90 tablet 1    Sennosides 25 MG TABS Take 1

## 2025-04-17 ENCOUNTER — HOSPITAL ENCOUNTER (OUTPATIENT)
Facility: HOSPITAL | Age: 80
Setting detail: RECURRING SERIES
Discharge: HOME | End: 2025-04-20
Payer: MEDICARE

## 2025-04-17 VITALS — WEIGHT: 193.4 LBS | BODY MASS INDEX: 36.56 KG/M2

## 2025-04-17 PROCEDURE — 93798 PHYS/QHP OP CAR RHAB W/ECG: CPT

## 2025-04-17 ASSESSMENT — PATIENT HEALTH QUESTIONNAIRE - PHQ9
10. IF YOU CHECKED OFF ANY PROBLEMS, HOW DIFFICULT HAVE THESE PROBLEMS MADE IT FOR YOU TO DO YOUR WORK, TAKE CARE OF THINGS AT HOME, OR GET ALONG WITH OTHER PEOPLE: NOT DIFFICULT AT ALL
SUM OF ALL RESPONSES TO PHQ QUESTIONS 1-9: 6
8. MOVING OR SPEAKING SO SLOWLY THAT OTHER PEOPLE COULD HAVE NOTICED. OR THE OPPOSITE, BEING SO FIGETY OR RESTLESS THAT YOU HAVE BEEN MOVING AROUND A LOT MORE THAN USUAL: NOT AT ALL
1. LITTLE INTEREST OR PLEASURE IN DOING THINGS: SEVERAL DAYS
7. TROUBLE CONCENTRATING ON THINGS, SUCH AS READING THE NEWSPAPER OR WATCHING TELEVISION: NOT AT ALL
SUM OF ALL RESPONSES TO PHQ QUESTIONS 1-9: 6
SUM OF ALL RESPONSES TO PHQ QUESTIONS 1-9: 6
4. FEELING TIRED OR HAVING LITTLE ENERGY: MORE THAN HALF THE DAYS
3. TROUBLE FALLING OR STAYING ASLEEP: NOT AT ALL
6. FEELING BAD ABOUT YOURSELF - OR THAT YOU ARE A FAILURE OR HAVE LET YOURSELF OR YOUR FAMILY DOWN: MORE THAN HALF THE DAYS
9. THOUGHTS THAT YOU WOULD BE BETTER OFF DEAD, OR OF HURTING YOURSELF: NOT AT ALL
SUM OF ALL RESPONSES TO PHQ QUESTIONS 1-9: 6
2. FEELING DOWN, DEPRESSED OR HOPELESS: SEVERAL DAYS
5. POOR APPETITE OR OVEREATING: NOT AT ALL

## 2025-04-17 ASSESSMENT — EXERCISE STRESS TEST
PEAK_RPE: 11
PEAK_HR: 89
PEAK_METS: 1.6
PEAK_BP: 200/90

## 2025-04-17 NOTE — CARDIO/PULMONARY
INTAKE APPOINTMENT NOTE  2025    NAME: Cristiane Centeno : 1945 AGE: 79 y.o.  GENDER: female    CARDIAC REHAB ADMITTING DIAGNOSIS: Personal history of surgery to heart and great vessels, presenting hazards to health [Z98.890]    REFERRING PHYSICIAN: Manuel Riddle MD    MEDICAL HX:  Past Medical History:   Diagnosis Date    Adverse effect of anesthesia     hard to wake up after surgery in     broken   leg and broken   ankle        Aortic stenosis     Arthritis     back,feet    Chronic pain     back,feet    Depression     GERD (gastroesophageal reflux disease)     Hiatal hernia     Hyperlipidemia     Hypertension     Murmur     Obesity     Other ill-defined conditions(799.89)     heart murmur     Prolonged emergence from general anesthesia     Stroke (HCC)     TIA, had aphagia and has resolved     Thyroid disease     Unspecified adverse effect of anesthesia     slow to wake up    Valvular heart disease        LABS:     Lab Results   Component Value Date/Time    CHOL 254 2025 11:51 AM    HDL 52 2025 11:51 AM    .4 2025 11:51 AM    .6 2024 12:56 PM    VLDL 28.6 2025 11:51 AM    VLDL 19 2020 10:15 AM         ANTHROPOMETRICS:      Ht Readings from Last 1 Encounters:   25 1.549 m (5' 0.98\")      Wt Readings from Last 1 Encounters:   25 87.7 kg (193 lb 6.4 oz)        WAIST: 41       VISIT SUMMARY:    Cristiane Centeno 79 y.o. presented to Cardiac Rehab for program orientation and 6 minute walk test today with a primary diagnosis of Personal history of surgery to heart and great vessels, presenting hazards to health [Z98.890]. EF is 55 % Cardiac risk factors include family history, dyslipidemia, hypertension, stress.   Patient is not a smoker.   Cristiane Centeno is  and lives with her . She reports a healthy social hx. Patient was evaluated for depression using the PHQ-9 assessment tool with a result of 6 which is considered

## 2025-04-21 ENCOUNTER — HOSPITAL ENCOUNTER (OUTPATIENT)
Facility: HOSPITAL | Age: 80
Setting detail: RECURRING SERIES
Discharge: HOME | End: 2025-04-24
Payer: MEDICARE

## 2025-04-21 VITALS — WEIGHT: 192.2 LBS | BODY MASS INDEX: 36.33 KG/M2

## 2025-04-21 PROCEDURE — 93798 PHYS/QHP OP CAR RHAB W/ECG: CPT

## 2025-04-21 ASSESSMENT — EXERCISE STRESS TEST
PEAK_RPE: 11
PEAK_HR: 91
PEAK_METS: 1.6

## 2025-04-23 ENCOUNTER — HOSPITAL ENCOUNTER (OUTPATIENT)
Facility: HOSPITAL | Age: 80
Setting detail: RECURRING SERIES
Discharge: HOME | End: 2025-04-26
Payer: MEDICARE

## 2025-04-23 VITALS — WEIGHT: 192.4 LBS | BODY MASS INDEX: 36.37 KG/M2

## 2025-04-23 PROCEDURE — 93798 PHYS/QHP OP CAR RHAB W/ECG: CPT

## 2025-04-23 ASSESSMENT — EXERCISE STRESS TEST
PEAK_RPE: 11
PEAK_HR: 90
PEAK_METS: 1.6

## 2025-04-28 ENCOUNTER — HOSPITAL ENCOUNTER (OUTPATIENT)
Facility: HOSPITAL | Age: 80
Setting detail: RECURRING SERIES
Discharge: HOME | End: 2025-05-01
Payer: MEDICARE

## 2025-04-28 VITALS — WEIGHT: 191.4 LBS | BODY MASS INDEX: 36.18 KG/M2

## 2025-04-28 PROCEDURE — 93798 PHYS/QHP OP CAR RHAB W/ECG: CPT

## 2025-04-28 ASSESSMENT — EXERCISE STRESS TEST
PEAK_HR: 85
PEAK_METS: 1.8
PEAK_RPE: 13

## 2025-04-29 ENCOUNTER — OFFICE VISIT (OUTPATIENT)
Age: 80
End: 2025-04-29

## 2025-04-29 VITALS
BODY MASS INDEX: 35.65 KG/M2 | OXYGEN SATURATION: 98 % | HEART RATE: 66 BPM | SYSTOLIC BLOOD PRESSURE: 153 MMHG | DIASTOLIC BLOOD PRESSURE: 70 MMHG | WEIGHT: 188.6 LBS | TEMPERATURE: 97.9 F

## 2025-04-29 DIAGNOSIS — Z95.2 S/P TAVR (TRANSCATHETER AORTIC VALVE REPLACEMENT): Primary | ICD-10-CM

## 2025-04-29 PROCEDURE — 1036F TOBACCO NON-USER: CPT | Performed by: THORACIC SURGERY (CARDIOTHORACIC VASCULAR SURGERY)

## 2025-04-29 RX ORDER — SPIRONOLACTONE 25 MG/1
25 TABLET ORAL DAILY
COMMUNITY
Start: 2025-04-24

## 2025-04-29 NOTE — PROGRESS NOTES
Name: Cristiane Centeno   : 1945   Home Phone: [phone]     Reviewed record in preparation for visit and have obtained necessary documentation. Identified pt with two pt identifiers (name and ).     1. Have you been to the ER, urgent care clinic since your last visit?  Hospitalized since your last visit?No    2. Have you seen or consulted any other health care providers outside of the [facility] since your last visit?  Include any pap smears or colon screening. No      Cristiane Centeno is being seen for TAVR follow up approximately One month post-operatively”.     Patient counseled on Office contact information and instructed to follow up  with primary cardiologist. Patient given opportunity to ask questions and receive clarification.     Current Medications-Reviewed with Patient    Allergies-Reviewed with Patient        Vitals  Wt 85.5 kg (188 lb 9.6 oz)   BMI 35.65 kg/m²           
by mouth daily      vitamin D 25 MCG (1000 UT) CAPS Take by mouth daily      amLODIPine (NORVASC) 5 MG tablet Take 1 tablet by mouth daily 90 tablet 3    metoprolol succinate (TOPROL XL) 25 MG extended release tablet Take 1 tablet by mouth daily 90 tablet 3    levothyroxine (SYNTHROID) 25 MCG tablet Take 1 tablet by mouth every morning (before breakfast) 90 tablet 3    ezetimibe (ZETIA) 10 MG tablet Take 1 tablet by mouth daily 90 tablet 1    Sennosides 25 MG TABS Take 1 tablet by mouth daily as needed      furosemide (LASIX) 20 MG tablet Take 1 tablet by mouth daily as needed (Take 1 Tablet by mouth daily as needed for PRN Reason (Other) (Edema). Indications: visible water retention) 90 tablet 1    aspirin 81 MG EC tablet Take by mouth daily      NONFORMULARY Take 100 mg by mouth daily Jaylen Red      Multiple Vitamins-Minerals (PRESERVISION AREDS 2 PO) Take by mouth in the morning and at bedtime      Cranberry-Vitamin C-Vitamin E 140-100-3 MG-MG-UNIT CAPS Take 500 mg by mouth in the morning, at noon, and at bedtime      vitamin E 400 UNIT capsule Take by mouth daily      Multiple Vitamins-Minerals (MULTIVITAMIN GUMMIES ADULT PO) Take 2 tablets by mouth daily      Cyanocobalamin (VITAMIN B-12 PO) Take by mouth daily      acetaminophen (TYLENOL) 500 MG tablet Take by mouth 3 times daily      Biotin 10 MG CAPS Take 2 tablets by mouth every evening       No current facility-administered medications for this visit.       Vitals: Blood pressure (!) 153/70, pulse 66, temperature 97.9 °F (36.6 °C), weight 85.5 kg (188 lb 9.6 oz), SpO2 98%.       Allergies: is allergic to statins, colestipol, duloxetine, gabapentin, iodine, penicillins, piper, pregabalin, and other.    Review of Systems: Pertinent Positives/Negatives per HPI    Cardiovascular Testing:     EKG, 4/24/25:       TTE, 4/24/25:          Physical Exam:  General: awake, alert, and oriented   Lungs:    clear to auscultation bilaterally   Incision:  N/A   Heart:

## 2025-04-30 ENCOUNTER — HOSPITAL ENCOUNTER (OUTPATIENT)
Facility: HOSPITAL | Age: 80
Setting detail: RECURRING SERIES
Discharge: HOME | End: 2025-05-03
Payer: MEDICARE

## 2025-04-30 VITALS — WEIGHT: 191.4 LBS | BODY MASS INDEX: 36.18 KG/M2

## 2025-04-30 PROCEDURE — 93798 PHYS/QHP OP CAR RHAB W/ECG: CPT

## 2025-04-30 ASSESSMENT — EXERCISE STRESS TEST
PEAK_RPE: 13
PEAK_METS: 1.8
PEAK_HR: 84

## 2025-05-05 ENCOUNTER — HOSPITAL ENCOUNTER (OUTPATIENT)
Facility: HOSPITAL | Age: 80
Setting detail: RECURRING SERIES
Discharge: HOME | End: 2025-05-08
Payer: MEDICARE

## 2025-05-05 VITALS — BODY MASS INDEX: 36.22 KG/M2 | WEIGHT: 191.6 LBS

## 2025-05-05 PROCEDURE — 93798 PHYS/QHP OP CAR RHAB W/ECG: CPT

## 2025-05-05 ASSESSMENT — EXERCISE STRESS TEST
PEAK_RPE: 13
PEAK_METS: 1.8
PEAK_HR: 85

## 2025-05-12 ENCOUNTER — HOSPITAL ENCOUNTER (OUTPATIENT)
Facility: HOSPITAL | Age: 80
Setting detail: RECURRING SERIES
Discharge: HOME | End: 2025-05-15
Payer: MEDICARE

## 2025-05-12 VITALS — WEIGHT: 189 LBS | BODY MASS INDEX: 35.73 KG/M2

## 2025-05-12 PROCEDURE — 93798 PHYS/QHP OP CAR RHAB W/ECG: CPT

## 2025-05-12 ASSESSMENT — EXERCISE STRESS TEST
PEAK_HR: 88
PEAK_RPE: 12
PEAK_METS: 1.8

## 2025-05-14 ENCOUNTER — HOSPITAL ENCOUNTER (OUTPATIENT)
Facility: HOSPITAL | Age: 80
Setting detail: RECURRING SERIES
Discharge: HOME | End: 2025-05-17
Payer: MEDICARE

## 2025-05-14 VITALS — BODY MASS INDEX: 35.92 KG/M2 | WEIGHT: 190 LBS

## 2025-05-14 PROCEDURE — 93798 PHYS/QHP OP CAR RHAB W/ECG: CPT

## 2025-05-14 ASSESSMENT — EXERCISE STRESS TEST
PEAK_METS: 1.8
PEAK_HR: 114
PEAK_RPE: 12

## 2025-05-15 RX ORDER — FUROSEMIDE 20 MG/1
20 TABLET ORAL DAILY PRN
Qty: 90 TABLET | Refills: 1 | Status: SHIPPED | OUTPATIENT
Start: 2025-05-15

## 2025-05-15 NOTE — TELEPHONE ENCOUNTER
Received faxed refill request from pharmacy      PCP: Alexis Driscoll MD    Last appt: 1/22/2025  Future Appointments   Date Time Provider Department Center   5/19/2025 10:00 AM [facility]   5/21/2025 10:00 AM [facility]   5/28/2025 10:00 AM [facility]   6/3/2025 10:00 AM [facility]   6/5/2025 10:00 AM [facility]   6/9/2025 10:00 AM [facility]   6/11/2025 10:00 AM [facility]   6/16/2025 10:00 AM [facility]   6/18/2025 10:00 AM [facility]   6/23/2025 10:00 AM [facility]   6/25/2025 10:00 AM [facility]   6/30/2025 10:00 AM [facility]   7/2/2025 10:00 AM [facility]   7/7/2025 10:00 AM [facility]   7/9/2025 10:00 AM [facility]   7/14/2025 10:00 AM [facility]   7/16/2025 10:00 AM [facility]   7/21/2025 10:00 AM [facility]   7/23/2025 10:00 AM [facility]   7/28/2025 10:00 AM [facility]   7/30/2025 10:00 AM [facility]   8/4/2025 10:00 AM [facility]ULM [facility]ERC[facility]   8/6/2025 10:00 AM [facility]ARDIOPULM [facility]ERC[facility]   8/11/2025 10:00 AM [facility]ULM [facility]   1/22/2026 11:00 AM Alexis Driscoll MD [facility]   4/28/2026 10:30 AM Harry Yañez MD [facility]       Requested Prescriptions     Pending Prescriptions Disp Refills    furosemide (LASIX) 20 MG tablet 90 tablet 1     Sig: Take 1 tablet by mouth daily as needed (Take 1 Tablet by mouth daily as needed for PRN Reason (Other) (Edema). Indications: visible water retention)

## 2025-05-19 ENCOUNTER — HOSPITAL ENCOUNTER (OUTPATIENT)
Facility: HOSPITAL | Age: 80
Setting detail: RECURRING SERIES
Discharge: HOME | End: 2025-05-22
Payer: MEDICARE

## 2025-05-21 ENCOUNTER — HOSPITAL ENCOUNTER (OUTPATIENT)
Facility: HOSPITAL | Age: 80
Setting detail: RECURRING SERIES
Discharge: HOME | End: 2025-05-24
Payer: MEDICARE

## 2025-05-21 VITALS — BODY MASS INDEX: 35.73 KG/M2 | WEIGHT: 189 LBS

## 2025-05-21 PROCEDURE — 93798 PHYS/QHP OP CAR RHAB W/ECG: CPT

## 2025-05-21 ASSESSMENT — EXERCISE STRESS TEST
PEAK_METS: 1.8
PEAK_HR: 87
PEAK_RPE: 12

## 2025-05-29 ENCOUNTER — HOSPITAL ENCOUNTER (OUTPATIENT)
Facility: HOSPITAL | Age: 80
Setting detail: RECURRING SERIES
End: 2025-05-29
Payer: MEDICARE

## 2025-05-29 VITALS — BODY MASS INDEX: 35.92 KG/M2 | WEIGHT: 190 LBS

## 2025-05-29 PROCEDURE — 93798 PHYS/QHP OP CAR RHAB W/ECG: CPT

## 2025-05-29 ASSESSMENT — EXERCISE STRESS TEST
PEAK_METS: 1.8
PEAK_RPE: 12
PEAK_HR: 90

## 2025-06-02 ENCOUNTER — APPOINTMENT (OUTPATIENT)
Facility: HOSPITAL | Age: 80
End: 2025-06-02
Payer: MEDICARE

## 2025-06-03 ENCOUNTER — HOSPITAL ENCOUNTER (OUTPATIENT)
Facility: HOSPITAL | Age: 80
Setting detail: RECURRING SERIES
Discharge: HOME | End: 2025-06-06
Payer: MEDICARE

## 2025-06-03 VITALS — BODY MASS INDEX: 35.92 KG/M2 | WEIGHT: 190 LBS

## 2025-06-03 PROCEDURE — 93798 PHYS/QHP OP CAR RHAB W/ECG: CPT

## 2025-06-03 ASSESSMENT — EXERCISE STRESS TEST
PEAK_HR: 95
PEAK_RPE: 12
PEAK_METS: 2

## 2025-06-04 ENCOUNTER — APPOINTMENT (OUTPATIENT)
Facility: HOSPITAL | Age: 80
End: 2025-06-04
Payer: MEDICARE

## 2025-06-05 ENCOUNTER — HOSPITAL ENCOUNTER (OUTPATIENT)
Facility: HOSPITAL | Age: 80
Setting detail: RECURRING SERIES
Discharge: HOME | End: 2025-06-08
Payer: MEDICARE

## 2025-06-05 VITALS — BODY MASS INDEX: 36.11 KG/M2 | WEIGHT: 191 LBS

## 2025-06-05 PROCEDURE — 93798 PHYS/QHP OP CAR RHAB W/ECG: CPT

## 2025-06-05 ASSESSMENT — EXERCISE STRESS TEST
PEAK_HR: 89
PEAK_METS: 2
PEAK_RPE: 12

## 2025-06-09 ENCOUNTER — HOSPITAL ENCOUNTER (OUTPATIENT)
Facility: HOSPITAL | Age: 80
Setting detail: RECURRING SERIES
Discharge: HOME | End: 2025-06-12
Payer: MEDICARE

## 2025-06-09 VITALS — WEIGHT: 190 LBS | BODY MASS INDEX: 35.92 KG/M2

## 2025-06-09 PROCEDURE — 93798 PHYS/QHP OP CAR RHAB W/ECG: CPT

## 2025-06-09 ASSESSMENT — EXERCISE STRESS TEST
PEAK_HR: 94
PEAK_RPE: 12
PEAK_METS: 2

## 2025-06-11 ENCOUNTER — HOSPITAL ENCOUNTER (OUTPATIENT)
Facility: HOSPITAL | Age: 80
Setting detail: RECURRING SERIES
Discharge: HOME | End: 2025-06-14
Payer: MEDICARE

## 2025-06-11 VITALS — WEIGHT: 190 LBS | BODY MASS INDEX: 35.92 KG/M2

## 2025-06-11 PROCEDURE — 93798 PHYS/QHP OP CAR RHAB W/ECG: CPT

## 2025-06-11 ASSESSMENT — EXERCISE STRESS TEST
PEAK_METS: 2
PEAK_HR: 96
PEAK_RPE: 12

## 2025-06-16 ENCOUNTER — HOSPITAL ENCOUNTER (OUTPATIENT)
Facility: HOSPITAL | Age: 80
Setting detail: RECURRING SERIES
Discharge: HOME | End: 2025-06-19
Payer: MEDICARE

## 2025-06-16 VITALS — BODY MASS INDEX: 36.49 KG/M2 | WEIGHT: 193 LBS

## 2025-06-16 PROCEDURE — 93798 PHYS/QHP OP CAR RHAB W/ECG: CPT

## 2025-06-16 ASSESSMENT — EXERCISE STRESS TEST
PEAK_RPE: 11
PEAK_METS: 2
PEAK_HR: 92

## 2025-06-18 ENCOUNTER — HOSPITAL ENCOUNTER (OUTPATIENT)
Facility: HOSPITAL | Age: 80
Setting detail: RECURRING SERIES
Discharge: HOME | End: 2025-06-21
Payer: MEDICARE

## 2025-06-18 PROCEDURE — 93798 PHYS/QHP OP CAR RHAB W/ECG: CPT

## 2025-06-23 ENCOUNTER — HOSPITAL ENCOUNTER (OUTPATIENT)
Facility: HOSPITAL | Age: 80
Setting detail: RECURRING SERIES
Discharge: HOME | End: 2025-06-26
Payer: MEDICARE

## 2025-06-23 VITALS — WEIGHT: 190 LBS | BODY MASS INDEX: 35.92 KG/M2

## 2025-06-23 PROCEDURE — 93798 PHYS/QHP OP CAR RHAB W/ECG: CPT

## 2025-06-23 ASSESSMENT — EXERCISE STRESS TEST
PEAK_METS: 2
PEAK_RPE: 11
PEAK_HR: 114

## 2025-06-25 ENCOUNTER — HOSPITAL ENCOUNTER (OUTPATIENT)
Facility: HOSPITAL | Age: 80
Setting detail: RECURRING SERIES
Discharge: HOME | End: 2025-06-28
Payer: MEDICARE

## 2025-06-25 VITALS — BODY MASS INDEX: 35.73 KG/M2 | WEIGHT: 189 LBS

## 2025-06-25 PROCEDURE — 93798 PHYS/QHP OP CAR RHAB W/ECG: CPT

## 2025-06-25 ASSESSMENT — EXERCISE STRESS TEST
PEAK_HR: 99
PEAK_RPE: 11
PEAK_METS: 2.5

## 2025-06-30 ENCOUNTER — HOSPITAL ENCOUNTER (OUTPATIENT)
Facility: HOSPITAL | Age: 80
Setting detail: RECURRING SERIES
Discharge: HOME | End: 2025-07-03
Payer: MEDICARE

## 2025-06-30 VITALS — WEIGHT: 191 LBS | BODY MASS INDEX: 36.11 KG/M2

## 2025-06-30 PROCEDURE — 93798 PHYS/QHP OP CAR RHAB W/ECG: CPT

## 2025-06-30 ASSESSMENT — EXERCISE STRESS TEST
PEAK_RPE: 11
PEAK_HR: 96
PEAK_METS: 2.5

## 2025-07-02 ENCOUNTER — HOSPITAL ENCOUNTER (OUTPATIENT)
Facility: HOSPITAL | Age: 80
Setting detail: RECURRING SERIES
Discharge: HOME | End: 2025-07-05
Payer: MEDICARE

## 2025-07-02 VITALS — WEIGHT: 191 LBS | BODY MASS INDEX: 36.11 KG/M2

## 2025-07-02 PROCEDURE — 93798 PHYS/QHP OP CAR RHAB W/ECG: CPT

## 2025-07-02 ASSESSMENT — EXERCISE STRESS TEST
PEAK_RPE: 11
PEAK_HR: 100
PEAK_METS: 2.5

## 2025-07-07 ENCOUNTER — HOSPITAL ENCOUNTER (OUTPATIENT)
Facility: HOSPITAL | Age: 80
Setting detail: RECURRING SERIES
Discharge: HOME | End: 2025-07-10
Payer: MEDICARE

## 2025-07-07 VITALS — BODY MASS INDEX: 36.11 KG/M2 | WEIGHT: 191 LBS

## 2025-07-07 PROCEDURE — 93798 PHYS/QHP OP CAR RHAB W/ECG: CPT

## 2025-07-07 ASSESSMENT — EXERCISE STRESS TEST
PEAK_RPE: 11
PEAK_HR: 100
PEAK_METS: 2.5

## 2025-07-09 ENCOUNTER — HOSPITAL ENCOUNTER (OUTPATIENT)
Facility: HOSPITAL | Age: 80
Setting detail: RECURRING SERIES
Discharge: HOME | End: 2025-07-12
Payer: MEDICARE

## 2025-07-09 VITALS — WEIGHT: 192 LBS | BODY MASS INDEX: 36.3 KG/M2

## 2025-07-09 PROCEDURE — 93798 PHYS/QHP OP CAR RHAB W/ECG: CPT

## 2025-07-09 ASSESSMENT — EXERCISE STRESS TEST
PEAK_HR: 98
PEAK_RPE: 10
PEAK_METS: 2.5

## 2025-07-14 ENCOUNTER — HOSPITAL ENCOUNTER (OUTPATIENT)
Facility: HOSPITAL | Age: 80
Setting detail: RECURRING SERIES
Discharge: HOME | End: 2025-07-17
Payer: MEDICARE

## 2025-07-14 VITALS — WEIGHT: 192 LBS | BODY MASS INDEX: 36.3 KG/M2

## 2025-07-14 PROCEDURE — 93798 PHYS/QHP OP CAR RHAB W/ECG: CPT

## 2025-07-14 ASSESSMENT — EXERCISE STRESS TEST
PEAK_RPE: 10
PEAK_METS: 2.4
PEAK_HR: 100

## 2025-07-16 ENCOUNTER — HOSPITAL ENCOUNTER (OUTPATIENT)
Facility: HOSPITAL | Age: 80
Setting detail: RECURRING SERIES
Discharge: HOME | End: 2025-07-19
Payer: MEDICARE

## 2025-07-16 VITALS — BODY MASS INDEX: 36.3 KG/M2 | WEIGHT: 192 LBS

## 2025-07-16 PROCEDURE — 93798 PHYS/QHP OP CAR RHAB W/ECG: CPT

## 2025-07-16 ASSESSMENT — EXERCISE STRESS TEST
PEAK_RPE: 10
PEAK_HR: 104
PEAK_METS: 2.5

## 2025-07-21 ENCOUNTER — HOSPITAL ENCOUNTER (OUTPATIENT)
Facility: HOSPITAL | Age: 80
Setting detail: RECURRING SERIES
Discharge: HOME | End: 2025-07-24
Payer: MEDICARE

## 2025-07-21 VITALS — BODY MASS INDEX: 36.11 KG/M2 | WEIGHT: 191 LBS

## 2025-07-21 PROCEDURE — 93798 PHYS/QHP OP CAR RHAB W/ECG: CPT

## 2025-07-21 ASSESSMENT — EXERCISE STRESS TEST
PEAK_METS: 2.5
PEAK_RPE: 10
PEAK_HR: 98

## 2025-07-23 ENCOUNTER — HOSPITAL ENCOUNTER (OUTPATIENT)
Facility: HOSPITAL | Age: 80
Setting detail: RECURRING SERIES
Discharge: HOME | End: 2025-07-26
Payer: MEDICARE

## 2025-07-23 VITALS — WEIGHT: 191 LBS | BODY MASS INDEX: 36.11 KG/M2

## 2025-07-23 PROCEDURE — 93798 PHYS/QHP OP CAR RHAB W/ECG: CPT

## 2025-07-23 ASSESSMENT — EXERCISE STRESS TEST
PEAK_METS: 2.5
PEAK_HR: 101
PEAK_RPE: 10

## 2025-07-28 ENCOUNTER — HOSPITAL ENCOUNTER (OUTPATIENT)
Facility: HOSPITAL | Age: 80
Setting detail: RECURRING SERIES
Discharge: HOME | End: 2025-07-31
Payer: MEDICARE

## 2025-07-28 VITALS — WEIGHT: 190 LBS | BODY MASS INDEX: 35.92 KG/M2

## 2025-07-28 PROCEDURE — 93798 PHYS/QHP OP CAR RHAB W/ECG: CPT

## 2025-07-28 ASSESSMENT — EXERCISE STRESS TEST
PEAK_RPE: 10
PEAK_METS: 2.5
PEAK_HR: 99

## 2025-07-29 DIAGNOSIS — E78.00 PURE HYPERCHOLESTEROLEMIA: Primary | ICD-10-CM

## 2025-07-29 RX ORDER — EZETIMIBE 10 MG/1
10 TABLET ORAL DAILY
Qty: 90 TABLET | Refills: 1 | Status: SHIPPED | OUTPATIENT
Start: 2025-07-29

## 2025-07-29 NOTE — TELEPHONE ENCOUNTER
PCP: Alexis Driscoll MD    Last appt: 1/22/2025  Future Appointments   Date Time Provider Department Center   7/30/2025 10:00 AM [facility]ARDIOPULM [facility]ERCISE [facility]   8/6/2025 10:00 AM [facility]ULM [facility]   8/11/2025 10:00 AM [facility]ULM [facility]   1/22/2026 11:00 AM Alexis Driscoll MD [facility]   4/28/2026 10:30 AM Harry Yañez MD [facility]       Requested Prescriptions     Pending Prescriptions Disp Refills    ezetimibe (ZETIA) 10 MG tablet 90 tablet 1     Sig: Take 1 tablet by mouth daily

## 2025-07-30 ENCOUNTER — HOSPITAL ENCOUNTER (OUTPATIENT)
Facility: HOSPITAL | Age: 80
Setting detail: RECURRING SERIES
Discharge: HOME | End: 2025-08-02
Payer: MEDICARE

## 2025-07-30 VITALS — BODY MASS INDEX: 35.92 KG/M2 | WEIGHT: 190 LBS

## 2025-07-30 PROCEDURE — 93798 PHYS/QHP OP CAR RHAB W/ECG: CPT

## 2025-07-30 ASSESSMENT — EXERCISE STRESS TEST
PEAK_METS: 2.5
PEAK_RPE: 10
PEAK_HR: 96

## 2025-08-04 ENCOUNTER — APPOINTMENT (OUTPATIENT)
Facility: HOSPITAL | Age: 80
End: 2025-08-04
Payer: MEDICARE

## 2025-08-06 ENCOUNTER — HOSPITAL ENCOUNTER (OUTPATIENT)
Facility: HOSPITAL | Age: 80
Setting detail: RECURRING SERIES
Discharge: HOME | End: 2025-08-09
Payer: MEDICARE

## 2025-08-06 VITALS — WEIGHT: 191 LBS | BODY MASS INDEX: 36.11 KG/M2

## 2025-08-06 PROCEDURE — 93798 PHYS/QHP OP CAR RHAB W/ECG: CPT

## 2025-08-06 ASSESSMENT — EXERCISE STRESS TEST
PEAK_RPE: 10
PEAK_METS: 2.5
PEAK_HR: 99

## 2025-08-11 ENCOUNTER — HOSPITAL ENCOUNTER (OUTPATIENT)
Facility: HOSPITAL | Age: 80
Setting detail: RECURRING SERIES
Discharge: HOME | End: 2025-08-14
Payer: MEDICARE

## 2025-08-11 VITALS — BODY MASS INDEX: 36.11 KG/M2 | WEIGHT: 191 LBS

## 2025-08-11 PROCEDURE — 93798 PHYS/QHP OP CAR RHAB W/ECG: CPT

## 2025-08-11 ASSESSMENT — EXERCISE STRESS TEST
PEAK_METS: 2.5
PEAK_HR: 101
PEAK_RPE: 9

## 2025-08-13 ENCOUNTER — HOSPITAL ENCOUNTER (OUTPATIENT)
Facility: HOSPITAL | Age: 80
Setting detail: RECURRING SERIES
Discharge: HOME | End: 2025-08-16
Payer: MEDICARE

## 2025-08-13 VITALS — BODY MASS INDEX: 36.3 KG/M2 | WEIGHT: 192 LBS

## 2025-08-13 PROCEDURE — 93798 PHYS/QHP OP CAR RHAB W/ECG: CPT

## 2025-08-13 ASSESSMENT — EXERCISE STRESS TEST
PEAK_HR: 104
PEAK_RPE: 80
PEAK_METS: 2.5

## 2025-08-18 ENCOUNTER — HOSPITAL ENCOUNTER (OUTPATIENT)
Facility: HOSPITAL | Age: 80
Setting detail: RECURRING SERIES
Discharge: HOME | End: 2025-08-21
Payer: MEDICARE

## 2025-08-18 VITALS — WEIGHT: 192 LBS | BODY MASS INDEX: 36.3 KG/M2

## 2025-08-18 PROCEDURE — 93798 PHYS/QHP OP CAR RHAB W/ECG: CPT

## 2025-08-18 ASSESSMENT — EXERCISE STRESS TEST
PEAK_RPE: 8
PEAK_METS: 2.5
PEAK_HR: 97

## 2025-08-20 ENCOUNTER — HOSPITAL ENCOUNTER (OUTPATIENT)
Facility: HOSPITAL | Age: 80
Setting detail: RECURRING SERIES
Discharge: HOME | End: 2025-08-23
Payer: MEDICARE

## 2025-08-20 PROCEDURE — 93798 PHYS/QHP OP CAR RHAB W/ECG: CPT

## 2025-08-20 ASSESSMENT — PATIENT HEALTH QUESTIONNAIRE - PHQ9
1. LITTLE INTEREST OR PLEASURE IN DOING THINGS: SEVERAL DAYS
SUM OF ALL RESPONSES TO PHQ QUESTIONS 1-9: 13
3. TROUBLE FALLING OR STAYING ASLEEP: NOT AT ALL
SUM OF ALL RESPONSES TO PHQ QUESTIONS 1-9: 13
5. POOR APPETITE OR OVEREATING: MORE THAN HALF THE DAYS
SUM OF ALL RESPONSES TO PHQ QUESTIONS 1-9: 13
2. FEELING DOWN, DEPRESSED OR HOPELESS: NEARLY EVERY DAY
4. FEELING TIRED OR HAVING LITTLE ENERGY: NEARLY EVERY DAY
SUM OF ALL RESPONSES TO PHQ QUESTIONS 1-9: 13
9. THOUGHTS THAT YOU WOULD BE BETTER OFF DEAD, OR OF HURTING YOURSELF: NOT AT ALL
6. FEELING BAD ABOUT YOURSELF - OR THAT YOU ARE A FAILURE OR HAVE LET YOURSELF OR YOUR FAMILY DOWN: SEVERAL DAYS
7. TROUBLE CONCENTRATING ON THINGS, SUCH AS READING THE NEWSPAPER OR WATCHING TELEVISION: NOT AT ALL
8. MOVING OR SPEAKING SO SLOWLY THAT OTHER PEOPLE COULD HAVE NOTICED. OR THE OPPOSITE, BEING SO FIGETY OR RESTLESS THAT YOU HAVE BEEN MOVING AROUND A LOT MORE THAN USUAL: NEARLY EVERY DAY

## 2025-08-25 ENCOUNTER — HOSPITAL ENCOUNTER (OUTPATIENT)
Facility: HOSPITAL | Age: 80
Setting detail: RECURRING SERIES
Discharge: HOME | End: 2025-08-28
Payer: MEDICARE

## 2025-08-25 VITALS — BODY MASS INDEX: 36.3 KG/M2 | WEIGHT: 192 LBS

## 2025-08-25 PROCEDURE — 93798 PHYS/QHP OP CAR RHAB W/ECG: CPT

## 2025-08-25 ASSESSMENT — EXERCISE STRESS TEST
PEAK_HR: 97
PEAK_RPE: 7
PEAK_METS: 2.5

## 2025-08-27 ENCOUNTER — HOSPITAL ENCOUNTER (OUTPATIENT)
Facility: HOSPITAL | Age: 80
Setting detail: RECURRING SERIES
Discharge: HOME | End: 2025-08-30
Payer: MEDICARE

## 2025-08-27 VITALS — WEIGHT: 192 LBS | BODY MASS INDEX: 36.3 KG/M2

## 2025-08-27 PROCEDURE — 93798 PHYS/QHP OP CAR RHAB W/ECG: CPT

## 2025-08-27 ASSESSMENT — EXERCISE STRESS TEST
PEAK_RPE: 7
PEAK_METS: 2.5
PEAK_HR: 99

## (undated) DEVICE — BASIN EMSIS 16OZ GRAPHITE PLAS KID SHP MOLD GRAD FOR ORAL

## (undated) DEVICE — PINNACLE INTRODUCER SHEATH: Brand: PINNACLE

## (undated) DEVICE — ELECTRODE,RADIOTRANSLUCENT,FOAM,5PK: Brand: MEDLINE

## (undated) DEVICE — NEONATAL-ADULT SPO2 SENSOR: Brand: NELLCOR

## (undated) DEVICE — PREP KIT PEEL PTCH POVIDONE IOD

## (undated) DEVICE — YANKAUER,TAPERED BULBOUS TIP,W/O VENT: Brand: MEDLINE

## (undated) DEVICE — TRANSFER SET 3": Brand: MEDLINE INDUSTRIES, INC.

## (undated) DEVICE — 3M™ TEGADERM™ TRANSPARENT FILM DRESSING FRAME STYLE, 1626W, 4 IN X 4-3/4 IN (10 CM X 12 CM), 50/CT 4CT/CASE: Brand: 3M™ TEGADERM™

## (undated) DEVICE — GUIDEWIRE VASC L260CM DIA0.035IN L7CM DIA3MM J TIP PTFE S

## (undated) DEVICE — SUTURE VCRL SZ 0 L27IN ABSRB UD L36MM CT-1 1/2 CIR J260H

## (undated) DEVICE — TOWEL,OR,DSP,ST,BLUE,STD,4/PK,20PK/CS: Brand: MEDLINE

## (undated) DEVICE — Device

## (undated) DEVICE — CEMENT MIXING SYSTEM WITH FEMORAL BREAKWAY NOZZLE: Brand: REVOLUTION

## (undated) DEVICE — SYR 20ML LL STRL LF --

## (undated) DEVICE — KIT ACCS INTRO 4FR L10CM NDL 21GA L7CM GWIRE L40CM

## (undated) DEVICE — CONTAINER SPEC 20 ML LID NEUT BUFF FORMALIN 10 % POLYPR STS

## (undated) DEVICE — PIN EXT FIX SCHNZ 3 MM

## (undated) DEVICE — PERCLOSE™ PROSTYLE™ SUTURE-MEDIATED CLOSURE AND REPAIR SYSTEM: Brand: PERCLOSE™ PROSTYLE™

## (undated) DEVICE — ZIMMER® STERILE DISPOSABLE TOURNIQUET CUFF WITH PROTECTIVE SLEEVE AND PLC, DUAL PORT, SINGLE BLADDER, 34 IN. (86 CM)

## (undated) DEVICE — 1200 GUARD II KIT W/5MM TUBE W/O VAC TUBE: Brand: GUARDIAN

## (undated) DEVICE — HEART CATH-MRMC: Brand: MEDLINE INDUSTRIES, INC.

## (undated) DEVICE — GUIDEWIRE VASC L260CM DIA0.035IN BRECKER FOR COREVLV

## (undated) DEVICE — BLOCK BITE ENDOSCP AD 21 MM W/ DIL BLU LF DISP

## (undated) DEVICE — TOWEL 4 PLY TISS 19X30 SUE WHT

## (undated) DEVICE — CATHETER DIAG 5FR L100CM LUMN ID0.047IN JR4 CRV 0 SIDE H

## (undated) DEVICE — PREP SKN CHLRAPRP APL 26ML STR --

## (undated) DEVICE — SYSTEM SKIN CLSR 22CM DERMBND PRINEO

## (undated) DEVICE — HANDPIECE SET WITH COAXIAL HIGH FLOW TIP AND SUCTION TUBE: Brand: INTERPULSE

## (undated) DEVICE — GUIDEWIRE VASC L260CM 0.035IN J TIP L3MM PTFE FIX COR NAMIC

## (undated) DEVICE — Z DISCONTINUED USE 2717541 SUTURE STRATAFIX SZ 3-0 L30CM NONABSORBABLE UD L26MM FS 3/8

## (undated) DEVICE — 3M™ IOBAN™ 2 ANTIMICROBIAL INCISE DRAPE 6650EZ: Brand: IOBAN™ 2

## (undated) DEVICE — GUIDEWIRE ANGIO L150CM OD0.035IN STR FIX PTFE SLD SUPP

## (undated) DEVICE — DRAPE,REIN 53X77,STERILE: Brand: MEDLINE

## (undated) DEVICE — DRESSING WND ISLAND STD 4X10 IN MULT LAYR STRL SILVERLON

## (undated) DEVICE — PACK SURG PROC KNEE USER GPS

## (undated) DEVICE — NEEDLE HYPO 18GA L1.5IN PNK S STL HUB POLYPR SHLD REG BVL

## (undated) DEVICE — BANDAGE COMPR M W6INXL10YD WHT BGE VELC E MTRX HK AND LOOP

## (undated) DEVICE — COVER C ARM W36XL28IN BND BG SNAP KOVER

## (undated) DEVICE — CATH IV AUTOGRD BC PNK 20GA 25 -- INSYTE

## (undated) DEVICE — GLOVE SURG SZ 8 L12IN FNGR THK79MIL GRN LTX FREE

## (undated) DEVICE — DRESSING HEMOSTATIC INTVENT W/O SLT QUIKCLOT

## (undated) DEVICE — SC 3W HP RA OFF NB - PG: Brand: NAMIC

## (undated) DEVICE — SYRINGE ANGIO 10 CC BRL STD PRNT POLYCARB LT BLU MEDALLION

## (undated) DEVICE — FORCEPS BX L160CM DIA8MM GRSP DISECT CUP TIP NONLOCKING ROT

## (undated) DEVICE — SOLIDIFIER MEDC 1200ML -- CONVERT TO 356117

## (undated) DEVICE — CATHETER COR DIAG PIGTAILS PIG 145 CRV 5FR 110CM 6 SIDE H

## (undated) DEVICE — SYR 10ML LUER LOK 1/5ML GRAD --

## (undated) DEVICE — Device: Brand: JELCO

## (undated) DEVICE — SUTURE VCRL SZ 1 L27IN ABSRB VLT L36MM CT-1 1/2 CIR J341H

## (undated) DEVICE — BAG SPEC BIOHZRD 10 X 10 IN --

## (undated) DEVICE — CATHETER PACE 5FR L110CM 6FR INTRO D10CM 1MM SPACE POLYUR

## (undated) DEVICE — TOWEL,OR,DSP,ST,BLUE,STD,2/PK,40PK/CS: Brand: MEDLINE

## (undated) DEVICE — SYR 3ML LL TIP 1/10ML GRAD --

## (undated) DEVICE — STRYKER PERFORMANCE SERIES SAGITTAL BLADE: Brand: STRYKER PERFORMANCE SERIES

## (undated) DEVICE — Z DISCONTINUED PER MEDLINE LINE GAS SAMPLING O2/CO2 LNG AD 13 FT NSL W/ TBNG FILTERLINE

## (undated) DEVICE — PADDING CST 6IN STERILE --

## (undated) DEVICE — GUIDEWIRE VASCULAR L145CM 0.035IN J TIP L3MM PTFE FIX COR NAMIC

## (undated) DEVICE — SUTURE STRATAFIX SPRL SZ 1 L14IN ABSRB VLT L48CM CTX 1/2 SXPD2B405

## (undated) DEVICE — SUTURE ABSRB L30CM 2-0 VLT SPRL PDS + STRATAFIX SXPP1B410

## (undated) DEVICE — SOLUTION IRRIG 3000ML 0.9% SOD CHL USP UROMATIC PLAS CONT

## (undated) DEVICE — SOLUTION IRRIG 1000ML STRL H2O USP PLAS POUR BTL

## (undated) DEVICE — TOTAL JOINT-MRMC: Brand: MEDLINE INDUSTRIES, INC.

## (undated) DEVICE — SET ADMIN 16ML TBNG L100IN 2 Y INJ SITE IV PIGGY BK DISP

## (undated) DEVICE — BOWL MED L 32OZ PLAS W/ MOLD GRAD EZ OPN PEEL PCH

## (undated) DEVICE — PROVE COVER: Brand: UNBRANDED

## (undated) DEVICE — GLOVE ORTHO 8   MSG9480

## (undated) DEVICE — SYR ASSEMB INFL BLLN 60ML --